# Patient Record
Sex: MALE | Race: WHITE | NOT HISPANIC OR LATINO | Employment: OTHER | URBAN - METROPOLITAN AREA
[De-identification: names, ages, dates, MRNs, and addresses within clinical notes are randomized per-mention and may not be internally consistent; named-entity substitution may affect disease eponyms.]

---

## 2017-12-04 ENCOUNTER — ALLSCRIPTS OFFICE VISIT (OUTPATIENT)
Dept: OTHER | Facility: OTHER | Age: 62
End: 2017-12-04

## 2018-01-22 VITALS
OXYGEN SATURATION: 97 % | BODY MASS INDEX: 32.31 KG/M2 | TEMPERATURE: 95.9 F | HEIGHT: 69 IN | WEIGHT: 218.13 LBS | HEART RATE: 60 BPM

## 2018-01-23 NOTE — PROGRESS NOTES
Assessment   1  Never a smoker  2  Encounter for preventive health examination (V70 0) (Z00 00)    Plan  Encounter for screening for malignant neoplasm of colon    · (1) OCCULT BLOOD, FECAL IMMUNOCHEMICAL TEST; Status:Active; Requested  for:[id number]; Health Maintenance    · Start: Zoster (Zostavax) (Zoster (Zostavax)); INJECT 0 5 ML Once   · Follow-up visit in 1 year Evaluation and Treatment  Follow-up  Status: Hold For -  Scheduling  Requested for: 64HKU6953    Discussion/Summary  Impression:  health maintenance visit1  1   Currently, he  eats a healthy diet1  and  has an adequate exercise regimen1  1   Prostate cancer screening:  prostate cancer screening is current1  1   Testicular cancer screening:  monthly self testicular exam was advised1  1   Colorectal cancer screening:  fecal occult blood testing supplies were given1  1   The  immunizations will be given as outlined in the orders1  and  disucssed adacel and he will check for coverage1  1   He was advised to be evaluated by  an optometrist1  1   Advice and education were given regarding  weight bearing exercise1  1   Pt plans to get zostavax at the pharmacy and will ck about adacel coverage  COntinue present Rx  He gets annual labs in the spring and was given a fit test today  Rto 1 year/prn1        The treatment plan was reviewed with the patient/guardian  The patient/guardian understands and agrees with the treatment plan1          Self Referrals: No       1 Amended By: Erum Barboza; Dec 05 2016 9:10 AM EST    Chief Complaint  Pt presents for Annual exam  Pt feels well and without complaint at this time  Refills done as requested  Pt would like to discuss getting the Zostavax in the near future  Appetite is good  History of Present Illness  HM, Adult Male: The patient is being seen for a  health maintenance1  evaluation1   The last health maintenance visit was1  1 year(s) ago1   General Health:  The patient's health since the last visit is described as1  good1   He has regular dental visits1   He denies vision problems1   He denies hearing loss1   Immunizations status:1  not up to date1    Interested in zostavax1   Lifestyle:1   He consumes a diverse and healthy diet1   He does not have any weight concerns1   He exercises regularly1   He does not use tobacco1   He consumes alcohol1   He denies drug use1   Reproductive health:1   The patient is sexually active1   Birth control is not being practiced1   He denies erectile dysfunction1   Screening: Cancer screening reviewed and current1   Metabolic screening reviewed and current1   Risk screening reviewed and current1   HPI: Patient feels well  Hunting regularly and no symptoms when active  Does have shoulder discomfort at times but non limiting and he uses home exercises and biofreeze prn  1        1 Amended By: Daniel Bailon; Dec 05 2016 9:07 AM EST    Review of Systems    Constitutional:1  No fever or chills, feels well, no tiredness, no recent weight gain or weight loss1   Eyes:1  No complaints of eye pain, no red eyes, no discharge from eyes, no itchy eyes1   ENT:1  no complaints of earache, no hearing loss, no nosebleeds, no nasal discharge, no sore throat, no hoarseness1   Cardiovascular: 1  No complaints of slow heart rate, no fast heart rate, no chest pain, no palpitations, no leg claudication, no lower extremity1   Respiratory:1  No complaints of shortness of breath, no wheezing, no cough, no SOB on exertion, no orthopnea or PND1   Gastrointestinal:1  No complaints of abdominal pain, no constipation, no nausea or vomiting, no diarrhea or bloody stools1   Genitourinary:1  No complaints of dysuria, no incontinence, no hesitancy, no nocturia, no genital lesion, no testicular pain1   Musculoskeletal:1  No complaints of arthralgia, no myalgias, no joint swelling or stiffness, no limb pain or swelling1      Integumentary:1  No complaints of skin rash or skin lesions, no itching, no skin wound, no dry skin1   Neurological:1  No compliants of headache, no confusion, no convulsions, no numbness or tingling, no dizziness or fainting, no limb weakness, no difficulty walking1   Psychiatric:1  Is not suicidal, no sleep disturbances, no anxiety or depression, no change in personality, no emotional problems1   Endocrine:1  No complaints of proptosis, no hot flashes, no muscle weakness, no erectile dysfunction, no deepening of the voice, no feelings of weakness1   Hematologic/Lymphatic:1  No complaints of swollen glands, no swollen glands in the neck, does not bleed easily, no easy bruising1   Over the past 2 weeks, how often have you been bothered by the following problems? 1 ) Little interest or pleasure in doing things? 1  Not at all1     2 ) Feeling down, depressed or hopeless? 1  Not at all1     3 ) Trouble falling asleep or sleeping too much? 1  Not at all1     4 ) Feeling tired or having little energy? 1  Not at all1     5 ) Poor appetite or overeating? 1  Not at all1     6 ) Feeling bad about yourself, or that you are a failure, or have let yourself or your family down? 1  Not at all1     7 ) Trouble concentrating on things, such as reading a newspaper or watching television? 1  Not at all1     8 ) Moving or speaking so slowly that other people could have noticed, or the opposite, moving or speaking faster than usual?1  Not at all1   How difficult have these problems made it for you to do your work, take care of things at home, or get along with people? 1  Not at all1   Score 1        1 Amended By: Bran Richey; Dec 05 2016 9:07 AM EST    Active Problems   1  Encounter for screening for malignant neoplasm of colon (V76 51) (Z12 11)  2  Hyperlipidemia (272 4) (E78 5)  3  Hypertension (401 9) (I10)  4   Right groin mass (789 39) (R19 09)    Past Medical History    · History of Bronchitis (490) (J40)   · History of Special screening examination for neoplasm of prostate (V76 44) (Z12 5)    Family History  Mother    · Family history of Glaucoma (365 9) (H40 9)   · Family history of Hypocholesteremia (272 5) (E78 6)  Father    · Family history of Heart attack (410 90) (I21 3)   · Family history of Hypocholesteremia (272 5) (E78 6)  Maternal Grandmother    · Family history of Glaucoma (365 9) (H40 9)    Social History    · Being A Social Drinker   · Caffeine Use   · Never a smoker   · Sun Protection Sunscreen   · Uses Safety Equipment - Seatbelts    Current Meds  1  Aspirin 81 MG TABS; Therapy: (Recorded:10Oct2012) to Recorded  2  Losartan Potassium-HCTZ 50-12 5 MG Oral Tablet; TAKE 1 TABLET DAILY; Therapy: 93PAG5800 to (Evaluate:17Nov2016)  Requested for: 06KKM8946; Last   Rx:23Nov2015 Ordered  3  Multiple Vitamins Oral Tablet; TAKE 1 TABLET DAILY; Therapy: (Recorded:47Yca5679) to Recorded    Allergies   1  No Known Drug Allergies    Vitals   Recorded: 34SMM4641 08:01AM Recorded: 86TTH5739 07:52AM   Temperature  13 3 F   Systolic 807    Diastolic 70    Height  5 ft 9 in   Weight  215 lb 4 00 oz   BMI Calculated  31 79   BSA Calculated  2 13     Physical Exam    Constitutional   General appearance: No acute distress, well appearing and well nourished  Head and Face1    Head and face: Normal 1    Palpation of the face and sinuses: No sinus tenderness  1    Eyes   Conjunctiva and lids: No erythema, swelling or discharge  Pupils and irises: Equal, round, reactive to light  Ophthalmoscopic examination: Normal fundi and optic discs  Ears, Nose, Mouth, and Throat   External inspection of ears and nose: Normal     Otoscopic examination: Tympanic membranes translucent with normal light reflex  Canals patent without erythema  Hearing: Normal     Nasal mucosa, septum, and turbinates: Normal without edema or erythema  Lips, teeth, and gums: Normal, good dentition  Oropharynx: Normal with no erythema, edema, exudate or lesions      Neck   Neck: Supple, symmetric, trachea midline, no masses  Thyroid: Normal, no thyromegaly  Pulmonary   Respiratory effort: No increased work of breathing or signs of respiratory distress  Percussion of chest: Normal     Palpation of chest: Normal     Auscultation of lungs: Clear to auscultation  Cardiovascular   Palpation of heart: Normal PMI, no thrills  Auscultation of heart: Normal rate and rhythm, normal S1 and S2, no murmurs  Carotid pulses: 2+ bilaterally  Abdominal aorta: Normal    Femoral pulses: 2+ bilaterally  Pedal pulses: 2+ bilaterally  Peripheral vascular exam: Normal 1    Examination of extremities for edema and/or varicosities: Normal    1    1    1    1    Abdomen   Abdomen: Non-tender, no masses  Liver and spleen: No hepatomegaly or splenomegaly  Examination for hernias: No hernias appreciated  1    1    1    1    1    1    Lymphatic   Palpation of lymph nodes in neck: No lymphadenopathy  Palpation of lymph nodes in axillae: No lymphadenopathy  Palpation of lymph nodes in groin: No lymphadenopathy  Palpation of lymph nodes in other areas: No lymphadenopathy  Musculoskeletal   Gait and station: Normal     Inspection/palpation of digits and nails: Normal without clubbing or cyanosis  Inspection/palpation of joints, bones, and muscles: Normal     Range of motion: Normal     Stability: Normal     Muscle strength/tone: Normal     Skin   Skin and subcutaneous tissue: Normal without rashes or lesions  Palpation of skin and subcutaneous tissue: Normal turgor  Neurologic   Cranial nerves: Cranial nerves 2-12 intact  Cortical function: Normal mental status  1    Reflexes: 2+ and symmetric  Sensation: No sensory loss  Coordination: Normal finger to nose and heel to shin  1    Psychiatric   Judgment and insight: Normal     Orientation to person, place and time: Normal     Recent and remote memory: Intact      Mood and affect: Normal         1 Amended By: Beatrice Kumar; Dec 05 2016 9:08 AM EST    Signatures   Electronically signed by : Gloria Larry DO; Dec  5 2016  9:10AM EST                       (Author)

## 2018-01-23 NOTE — PROGRESS NOTES
Assessment    1  Encounter for preventive health examination (V70 0) (Z00 00)   2  Eye irritation (379 99) (H57 8)    Plan  Depression screening    · *VB-Depression Screening; Status:Complete;   Done: 20TPI0293 12:00AM  Eye irritation    · Cephalexin 500 MG Oral Capsule; TAKE 1 CAPSULE 3 times daily  Health Maintenance    · Zoster (Zostavax) (Zoster (Zostavax))  Hyperlipidemia    · Benefits of Exercise/Physical Activity; Status:Complete;   Done: 87SAN4101   · Some eating tips that can help you lose weight ; Status:Complete;   Done: 65NZP7824   · We recommend that you bring your body mass index down to 26 ; Status:Complete;    Done: 85GDN0365    Discussion/Summary  health maintenance visit Impression: healthy adult male  Currently, he eats a healthy diet  Prostate cancer screening: prostate cancer screening is current  Keflex for eye lesion and if no change by 72 hours, ophtho evaluation ? blocked duct  Warm compresses  Will get labs in march  Increase fluids and exercise encouraged  Continue present rx  Rto 1 year  Due for routine eye exam and pt will setup regardless of eye sxs above  Possible side effects of new medications were reviewed with the patient/guardian today  The treatment plan was reviewed with the patient/guardian  The patient/guardian understands and agrees with the treatment plan         Self Referrals: No      Chief Complaint  Pt presents today for a yearly physical  No changes in medications, no current need for refills  No new drug allergies listed  Pt states that he has developed what he believed was a stye on the lower lid of his R eye, has drained but has not cleared up, would like to discuss  Pt notes that his appetite is normal, but he sometimes has difficulty sleeping  History of Present Illness  , Adult Male: The patient is being seen for a health maintenance evaluation  The last health maintenance visit was 1 year year(s) ago  General Health:  The patient's health since the last visit is described as good  He has regular dental visits  He denies vision problems  He denies hearing loss  Immunizations status: not up to date  Lifestyle:  He consumes a diverse and healthy diet  He does not have any weight concerns  He exercises regularly  He does not use tobacco  He denies alcohol use  He denies drug use  Reproductive health:  the patient is sexually active  birth control is being practiced  He complains of erectile dysfunction  Screening: cancer screening reviewed and current  metabolic screening reviewed and current  risk screening reviewed and current  Review of Systems    Constitutional: No fever or chills, feels well, no tiredness, no recent weight gain or weight loss  Eyes: No complaints of eye pain, no red eyes, no discharge from eyes, no itchy eyes  ENT: no complaints of earache, no hearing loss, no nosebleeds, no nasal discharge, no sore throat, no hoarseness  Cardiovascular: No complaints of slow heart rate, no fast heart rate, no chest pain, no palpitations, no leg claudication, no lower extremity  Respiratory: No complaints of shortness of breath, no wheezing, no cough, no SOB on exertion, no orthopnea or PND  Gastrointestinal: No complaints of abdominal pain, no constipation, no nausea or vomiting, no diarrhea or bloody stools  Genitourinary: No complaints of dysuria, no incontinence, no hesitancy, no nocturia, no genital lesion, no testicular pain  Musculoskeletal: No complaints of arthralgia, no myalgias, no joint swelling or stiffness, no limb pain or swelling  Integumentary: No complaints of skin rash or skin lesions, no itching, no skin wound, no dry skin  Neurological: No compliants of headache, no confusion, no convulsions, no numbness or tingling, no dizziness or fainting, no limb weakness, no difficulty walking     Psychiatric: Is not suicidal, no sleep disturbances, no anxiety or depression, no change in personality, no emotional problems  Endocrine: No complaints of proptosis, no hot flashes, no muscle weakness, no erectile dysfunction, no deepening of the voice, no feelings of weakness  Hematologic/Lymphatic: No complaints of swollen glands, no swollen glands in the neck, does not bleed easily, no easy bruising  Active Problems    1  Encounter for monitoring ACE-inhibitor therapy (V58 83,V58 69) (Z51 81,Z79 899)   2  Encounter for monitoring diuretic therapy (V58 83,V58 69) (Z51 81,Z79 899)   3  Encounter for screening for malignant neoplasm of colon (V76 51) (Z12 11)   4  Hyperlipidemia (272 4) (E78 5)   5  Hypertension (401 9) (I10)   6  Refused influenza vaccine (V64 06) (Z28 21)    Family History  Mother    · Family history of Glaucoma (365 9) (H40 9)   · Family history of Hypocholesteremia (272 5) (E78 6)  Father    · Family history of Heart attack (410 90) (I21 9)   · Family history of Hypocholesteremia (272 5) (E78 6)  Maternal Grandmother    · Family history of Glaucoma (365 9) (H40 9)    Social History    · Being A Social Drinker   · Caffeine Use   · Dental care, regularly   · Exercises 3 to 4 times per week (V49 89) (Z78 9)   · Lives independently with spouse   · Never a smoker   · Sun Protection Sunscreen   · Uses Safety Equipment - Seatbelts    Current Meds   1  Aspirin 81 MG TABS; Therapy: (Recorded:10Oct2012) to Recorded   2  Losartan Potassium-HCTZ 50-12 5 MG Oral Tablet; TAKE 1 TABLET DAILY; Therapy: 03OYZ8482 to (Evaluate:14Apr2018)  Requested for: 19Apr2017; Last   Rx:19Apr2017 Ordered   3  Multiple Vitamins Oral Tablet; TAKE 1 TABLET DAILY; Therapy: (Recorded:45Roh2963) to Recorded   4  Zoster (Zostavax); INJECT 0 5 ML Once; Therapy: 14PLO5542 to (Last Rx:94Eun4243) Ordered    Allergies    1   No Known Drug Allergies    Vitals   Recorded: 36ZWZ7733 07:49AM   Temperature 95 9 F, Tympanic   Heart Rate 60   Height 5 ft 9 in   Weight 218 lb 2 0 oz   BMI Calculated 32 21   BSA Calculated 2 14   O2 Saturation 97     Physical Exam    Constitutional   General appearance: No acute distress, well appearing and well nourished  Head and Face   Head and face: Normal     Palpation of the face and sinuses: No sinus tenderness  Eyes   Conjunctiva and lids: Abnormal   raised area lower lid right  Pupils and irises: Equal, round, reactive to light  Ophthalmoscopic examination: Normal fundi and optic discs  Ears, Nose, Mouth, and Throat   External inspection of ears and nose: Normal     Otoscopic examination: Tympanic membranes translucent with normal light reflex  Canals patent without erythema  Hearing: Normal     Nasal mucosa, septum, and turbinates: Normal without edema or erythema  Lips, teeth, and gums: Normal, good dentition  Oropharynx: Normal with no erythema, edema, exudate or lesions  Neck   Neck: Supple, symmetric, trachea midline, no masses  Thyroid: Normal, no thyromegaly  Pulmonary   Respiratory effort: No increased work of breathing or signs of respiratory distress  Percussion of chest: Normal     Palpation of chest: Normal     Auscultation of lungs: Clear to auscultation  Cardiovascular   Palpation of heart: Normal PMI, no thrills  Auscultation of heart: Normal rate and rhythm, normal S1 and S2, no murmurs  Carotid pulses: 2+ bilaterally  Abdominal aorta: Normal     Femoral pulses: 2+ bilaterally  Pedal pulses: 2+ bilaterally  Peripheral vascular exam: Normal     Examination of extremities for edema and/or varicosities: Normal     Abdomen   Abdomen: Non-tender, no masses  Liver and spleen: No hepatomegaly or splenomegaly  Examination for hernias: No hernias appreciated  Lymphatic   Palpation of lymph nodes in neck: No lymphadenopathy  Palpation of lymph nodes in axillae: No lymphadenopathy  Palpation of lymph nodes in groin: No lymphadenopathy  Palpation of lymph nodes in other areas: No lymphadenopathy      Musculoskeletal   Gait and station: Normal     Inspection/palpation of digits and nails: Normal without clubbing or cyanosis  Inspection/palpation of joints, bones, and muscles: Normal     Range of motion: Normal     Stability: Normal     Muscle strength/tone: Normal     Skin   Skin and subcutaneous tissue: Normal without rashes or lesions  Palpation of skin and subcutaneous tissue: Normal turgor  Neurologic   Cranial nerves: Cranial nerves 2-12 intact  Cortical function: Normal mental status  Reflexes: 2+ and symmetric  Sensation: No sensory loss  Coordination: Normal finger to nose and heel to shin  Psychiatric   Judgment and insight: Normal     Orientation to person, place and time: Normal     Recent and remote memory: Intact  Mood and affect: Normal        Results/Data  PHQ-2 Adult Depression Screening 60NFY7489 07:59AM User, [employer]     Test Name Result Flag Reference   PHQ-2 Adult Depression Score 0     Over the last two weeks, how often have you been bothered by any of the following problems? Little interest or pleasure in doing things: Not at all - 0  Feeling down, depressed, or hopeless: Not at all - 0   PHQ-2 Adult Depression Screening Negative       *VB-Depression Screening 53JLC6962 12:00AM Rica Peckey     Test Name Result Flag Reference   Depression Scale Result      Depression Screen - Negative For Symptoms       Health Management  Encounter for monitoring ACE-inhibitor therapy   (1) COMPREHENSIVE METABOLIC PANEL; every 1 year; Last 33KKQ7509; Next Due:  85IXN1536; Active  Encounter for monitoring diuretic therapy   (1) COMPREHENSIVE METABOLIC PANEL; every 1 year; Last 07JDF2557; Next Due:  26KZS9874; Active  Health Maintenance   (1) COMPREHENSIVE METABOLIC PANEL; every 1 year; Last 17BXN8156; Next Due:  20SNL6848;  Active    Signatures   Electronically signed by : Oscar Hook DO; Dec  4 2017 12:01PM EST                       (Author)

## 2018-03-17 LAB
ALP SERPL-CCNC: 68 U/L (ref 46–116)
AST SERPL W P-5'-P-CCNC: 23 U/L (ref 5–45)
BILIRUB SERPL-MCNC: 0.8 MG/DL
BUN SERPL-MCNC: 20 MG/DL (ref 5–25)
CHOLEST SERPL-MCNC: 191 MG/DL (ref 50–200)
CREAT SERPL-MCNC: 1.34 MG/DL (ref 0.6–1.3)
GLUCOSE SERPL-MCNC: 99 MG/DL
HCT VFR BLD AUTO: 42.7 % (ref 36.5–49.3)
HDLC SERPL-MCNC: 49 MG/DL (ref 40–60)
HGB BLD-MCNC: 14.9 G/DL (ref 12–17)
LDLC SERPL DIRECT ASSAY-MCNC: 126 MG/DL
LDLC/HDLC SERPL: 3.9 {RATIO}
NEUTROPHILS # BLD AUTO: 6.52 THOUSANDS/ΜL (ref 1.85–7.62)
PLATELET # BLD AUTO: 210 THOUSANDS/UL (ref 149–390)
POTASSIUM SERPL-SCNC: 4.8 MMOL/L (ref 3.5–5.3)
SODIUM SERPL-SCNC: 142 MMOL/L (ref 136–145)
TRIGL SERPL-MCNC: 64 MG/DL (ref ?–150)
WBC # BLD AUTO: 9.1 10*3/ML (ref 3.3–10)

## 2018-03-26 ENCOUNTER — TELEPHONE (OUTPATIENT)
Dept: INTERNAL MEDICINE CLINIC | Facility: CLINIC | Age: 63
End: 2018-03-26

## 2018-03-26 LAB — PSA (HISTORICAL): 5.1 NG/ML

## 2018-03-26 NOTE — TELEPHONE ENCOUNTER
Spoke with patient regarding recent [facility] labs  PSA elevated, referral will be made to [facility] ([facility] Urology, patient aware and approved  Cholesterol mildly elevated  Low fat diet/ exercise

## 2018-04-09 ENCOUNTER — TELEPHONE (OUTPATIENT)
Dept: INTERNAL MEDICINE CLINIC | Facility: CLINIC | Age: 63
End: 2018-04-09

## 2018-04-09 DIAGNOSIS — R97.20 ELEVATED PSA: Primary | ICD-10-CM

## 2018-05-16 RX ORDER — ZOSTER VACCINE LIVE 19400 [PFU]/.65ML
0.5 INJECTION, POWDER, LYOPHILIZED, FOR SUSPENSION SUBCUTANEOUS
COMMUNITY
Start: 2016-12-05 | End: 2019-12-13 | Stop reason: ALTCHOICE

## 2018-05-16 RX ORDER — LOSARTAN POTASSIUM AND HYDROCHLOROTHIAZIDE 12.5; 5 MG/1; MG/1
1 TABLET ORAL DAILY
COMMUNITY
Start: 2014-02-06 | End: 2019-05-17 | Stop reason: SDUPTHER

## 2018-05-30 ENCOUNTER — OFFICE VISIT (OUTPATIENT)
Dept: UROLOGY | Facility: CLINIC | Age: 63
End: 2018-05-30
Payer: COMMERCIAL

## 2018-05-30 VITALS
DIASTOLIC BLOOD PRESSURE: 82 MMHG | WEIGHT: 221 LBS | HEIGHT: 69 IN | BODY MASS INDEX: 32.73 KG/M2 | SYSTOLIC BLOOD PRESSURE: 124 MMHG | HEART RATE: 56 BPM

## 2018-05-30 DIAGNOSIS — R97.20 ELEVATED PSA: ICD-10-CM

## 2018-05-30 LAB
POST-VOID RESIDUAL VOLUME, ML POC: 0 ML
SL AMB  POCT GLUCOSE, UA: NORMAL
SL AMB LEUKOCYTE ESTERASE,UA: NORMAL
SL AMB POCT BILIRUBIN,UA: NORMAL
SL AMB POCT BLOOD,UA: NORMAL
SL AMB POCT CLARITY,UA: NORMAL
SL AMB POCT COLOR,UA: YELLOW
SL AMB POCT KETONES,UA: NORMAL
SL AMB POCT NITRITE,UA: NORMAL
SL AMB POCT PH,UA: 5
SL AMB POCT SPECIFIC GRAVITY,UA: 1.01
SL AMB POCT URINE PROTEIN: NORMAL
SL AMB POCT UROBILINOGEN: NORMAL

## 2018-05-30 PROCEDURE — 51798 US URINE CAPACITY MEASURE: CPT | Performed by: UROLOGY

## 2018-05-30 PROCEDURE — 81002 URINALYSIS NONAUTO W/O SCOPE: CPT | Performed by: UROLOGY

## 2018-05-30 PROCEDURE — 99244 OFF/OP CNSLTJ NEW/EST MOD 40: CPT | Performed by: UROLOGY

## 2018-05-30 NOTE — PROGRESS NOTES
UROLOGY NEW CONSULT NOTE     CHIEF COMPLAINT   Cheryle Rankin is a 65 y o  male with a complaint of   Chief Complaint   Patient presents with    Elevated PSA       History of Present Illness:     65 y o  male with recent PSA check of 5 1  Patient has been undergoing health fair PSAs for the last handful of years  He has not had a digital rectal exam for the last 3 years  He is  and has no family history of prostate cancer  His father in law does have prostate cancer and has been treated with radiation and androgen deprivation therapy  He presents for discussion  He has some mild low level urinary urgency and hesitancy  He is not interested in treatment for this  He is very healthy and exercises and lift weights regularly  AUA SS 13 QoL 2    PSA 3/17/2018 - 5 1  PSA 3/19/2017 - 3 9  PSA 3/20/2016 - 2 5  PSA 2015 - 3 7  PSA 2014 - 2 7  PSA 2013 - 2 5      Past Medical History:   No past medical history on file  PAST SURGICAL HISTORY:   No past surgical history on file  CURRENT MEDICATIONS:     Current Outpatient Prescriptions   Medication Sig Dispense Refill    aspirin 81 MG tablet Take by mouth      losartan-hydrochlorothiazide (HYZAAR) 50-12 5 mg per tablet Take 1 tablet by mouth daily      MULTIPLE VITAMINS ESSENTIAL PO Take 1 tablet by mouth daily      Zoster Vaccine Live (ZOSTAVAX) 87701 UNT/0 65ML SUSR Inject 0 5 mL under the skin       No current facility-administered medications for this visit          ALLERGIES:   No Known Allergies    SOCIAL HISTORY:     Social History     Social History    Marital status: /Civil Union     Spouse name: N/A    Number of children: N/A    Years of education: N/A     Social History Main Topics    Smoking status: Not on file    Smokeless tobacco: Not on file    Alcohol use Not on file    Drug use: Unknown    Sexual activity: Not on file     Other Topics Concern    Not on file     Social History Narrative    No narrative on file SOCIAL HISTORY:   No family history on file  REVIEW OF SYSTEMS:     Review of Systems   Constitutional: Negative  Respiratory: Negative  Cardiovascular: Negative  Gastrointestinal: Negative  Genitourinary: Positive for frequency and urgency  Musculoskeletal: Negative  Skin: Negative  Neurological: Negative  Psychiatric/Behavioral: Negative  PHYSICAL EXAM:     /82   Pulse 56   Ht 5' 9" (1 753 m)   Wt 100 kg (221 lb)   BMI 32 64 kg/m²     General:  Healthy appearing male in no acute distress  They have a normal affect  There is not appear to be any gross neurologic defects or abnormalities  HEENT:  Normocephalic, atraumatic  Neck is supple without any palpable lymphadenopathy  Cardiovascular:  Patient has normal palpable distal radial pulses  There is no significant peripheral edema  No JVD is noted  Respiratory:  Patient has unlabored respirations  There is no audible wheeze or rhonchi  Abdomen:  Abdomen is without surgical scars  Right anterior thigh scar  Abdomen is soft and nontender  There is no tympany  Inguinal and umbilical hernia are not appreciated  Genitourinary: no penile lesions or discharge, no testicular masses or tenderness, no hernias, TALYA broad based with left sided fullness but no discreet nodule    Musculoskeletal:  Patient does not have significant CVA tenderness in the  flank with palpation or percussion  They full range of motion in all 4 extremities  Strength in all 4 extremities appears congruent  Patient is able to ambulate without assistance or difficulty  Dermatologic:  Patient has no skin abnormalities or rashes        LABS:     CBC:   Lab Results   Component Value Date    WBC 9 1 03/17/2018    HGB 14 9 03/17/2018    HCT 42 7 03/17/2018     03/17/2018       BMP:   Lab Results   Component Value Date     03/17/2018    K 4 8 03/17/2018    BUN 20 03/17/2018    CREATININE 1 34 (A) 03/17/2018     PSA 3/17/2018 - 5  1  PSA 3/19/2017 - 3 9  PSA 3/20/2016 - 2 5  PSA 2015 - 3 7  PSA 2014 - 2 7  PSA 2013 - 2 5    IMAGING:     NO  IMAGING    PROCEDURE:     Recent Results (from the past 2 hour(s))   POCT Measure PVR    Collection Time: 05/30/18 10:13 AM   Result Value Ref Range    POST-VOID RESIDUAL VOLUME, ML POC 0 mL   POCT urine dip    Collection Time: 05/30/18 10:13 AM   Result Value Ref Range    LEUKOCYTE ESTERASE,UA -      NITRITE,UA -     SL AMB POCT UROBILINOGEN -     SL AMB POCT URINE PROTEIN -      PH,UA 5 0      BLOOD,UA -      SPECIFIC GRAVITY,UA 1 010      KETONES,UA -      BILIRUBIN,UA -     GLUCOSE, UA -      COLOR,UA yellow      CLARITY,UA trans         ASSESSMENT:     65 y o  male with elevated PSA    PLAN:       I gave the patient a general overview surrounding prostate health  We discussed the gland's anatomy and function  We discussed that PSA is protein made by the prostate gland in normal healthy males  When screening for prostate cancer, we evaluate man at high risk for prostate cancer or those men within a predefined age range who have a lfe expectancy greater than ten years  These screening guidelines were set forth by our colleagues at the [address] [place] [address] in an effort to help find early, treatable cancers but als to minimize worry and harm caused by over-screening and over-treatment  Screening is performed by both examining the prostate via a digital rectal exam and by checking a PSA in routine bloodwork  Should there PSA be elevated outside of an acceptable range or if they're found to have abnormalities on digital rectal exam, a careful discussion needs to be held about proceeding to the next step in management and obtaining tissue through prostate biopsy for evaluation for cancer      We discussed that PSA is an imperfect screening tool and there are other reasons at the PSA can be elevated including but not limited to urinary infection, section transmitted infection, benign prostatic enlargement, urinary stones, trauma, and recent sexual activity  Repeat PSA in 2 weeks with avoidance of ejaculation for three days prior and if PSA normalizes, additional recheck in 6 months  If PSA remains high, will have to discuss consideration of biopsy

## 2018-06-13 ENCOUNTER — APPOINTMENT (OUTPATIENT)
Dept: LAB | Facility: CLINIC | Age: 63
End: 2018-06-13
Payer: COMMERCIAL

## 2018-06-13 DIAGNOSIS — R97.20 ELEVATED PSA: ICD-10-CM

## 2018-06-13 PROCEDURE — 84154 ASSAY OF PSA FREE: CPT

## 2018-06-13 PROCEDURE — 36415 COLL VENOUS BLD VENIPUNCTURE: CPT

## 2018-06-13 PROCEDURE — 84153 ASSAY OF PSA TOTAL: CPT

## 2018-06-15 LAB
PSA FREE MFR SERPL: 20.3 %
PSA FREE SERPL-MCNC: 0.63 NG/ML
PSA SERPL-MCNC: 3.1 NG/ML (ref 0–4)

## 2018-06-18 ENCOUNTER — TELEPHONE (OUTPATIENT)
Dept: UROLOGY | Facility: CLINIC | Age: 63
End: 2018-06-18

## 2018-11-30 ENCOUNTER — TRANSCRIBE ORDERS (OUTPATIENT)
Dept: LAB | Facility: CLINIC | Age: 63
End: 2018-11-30

## 2018-11-30 ENCOUNTER — APPOINTMENT (OUTPATIENT)
Dept: LAB | Facility: CLINIC | Age: 63
End: 2018-11-30
Payer: COMMERCIAL

## 2018-11-30 DIAGNOSIS — R97.20 PSA ELEVATION: Primary | ICD-10-CM

## 2018-11-30 DIAGNOSIS — R97.20 PSA ELEVATION: ICD-10-CM

## 2018-11-30 LAB — PSA SERPL-MCNC: 4.8 NG/ML (ref 0–4)

## 2018-11-30 PROCEDURE — 84153 ASSAY OF PSA TOTAL: CPT

## 2018-12-03 ENCOUNTER — TELEPHONE (OUTPATIENT)
Dept: INTERNAL MEDICINE CLINIC | Facility: CLINIC | Age: 63
End: 2018-12-03

## 2018-12-03 DIAGNOSIS — R97.20 ELEVATED PSA: Primary | ICD-10-CM

## 2018-12-06 ENCOUNTER — OFFICE VISIT (OUTPATIENT)
Dept: INTERNAL MEDICINE CLINIC | Facility: CLINIC | Age: 63
End: 2018-12-06
Payer: COMMERCIAL

## 2018-12-06 ENCOUNTER — APPOINTMENT (OUTPATIENT)
Dept: RADIOLOGY | Facility: MEDICAL CENTER | Age: 63
End: 2018-12-06
Payer: COMMERCIAL

## 2018-12-06 VITALS
TEMPERATURE: 96.8 F | WEIGHT: 218.13 LBS | HEART RATE: 68 BPM | DIASTOLIC BLOOD PRESSURE: 68 MMHG | OXYGEN SATURATION: 96 % | HEIGHT: 69 IN | BODY MASS INDEX: 32.31 KG/M2 | SYSTOLIC BLOOD PRESSURE: 122 MMHG

## 2018-12-06 DIAGNOSIS — Z00.00 VISIT FOR PREVENTIVE HEALTH EXAMINATION: ICD-10-CM

## 2018-12-06 DIAGNOSIS — R52 PAIN: ICD-10-CM

## 2018-12-06 DIAGNOSIS — R52 PAIN: Primary | ICD-10-CM

## 2018-12-06 PROCEDURE — 73630 X-RAY EXAM OF FOOT: CPT

## 2018-12-06 PROCEDURE — 99396 PREV VISIT EST AGE 40-64: CPT | Performed by: INTERNAL MEDICINE

## 2018-12-06 NOTE — PATIENT INSTRUCTIONS

## 2018-12-07 ENCOUNTER — APPOINTMENT (OUTPATIENT)
Dept: LAB | Facility: CLINIC | Age: 63
End: 2018-12-07
Payer: COMMERCIAL

## 2018-12-07 DIAGNOSIS — R97.20 ELEVATED PSA: ICD-10-CM

## 2018-12-07 PROCEDURE — 84154 ASSAY OF PSA FREE: CPT

## 2018-12-07 PROCEDURE — 84153 ASSAY OF PSA TOTAL: CPT

## 2018-12-07 PROCEDURE — 36415 COLL VENOUS BLD VENIPUNCTURE: CPT

## 2018-12-08 LAB
PSA FREE MFR SERPL: 26.8 %
PSA FREE SERPL-MCNC: 1.66 NG/ML
PSA SERPL-MCNC: 6.2 NG/ML (ref 0–4)

## 2018-12-10 NOTE — PROGRESS NOTES
Pt was informed Pt states that he is going to hold off on the MRI and just keep an eye on it and will contact us if he decides to get the MRI

## 2018-12-10 NOTE — PROGRESS NOTES
12/11/2018      Chief Complaint   Patient presents with    Elevated PSA     6 mo f/u- PSA 6 2 12/7/18       Assessment and Plan    59 y o  male managed by Dr Fernando Vieyra    1  Elevated PSA  - PSA 6 2 (12/7/18), 4 8 (11/30/18), 3 1 (6/13/18), 5 1 (3/17/18), 3 9 (3/19/17), 2 5 (3/20/16), 3 7 (2015), 2 7 (2014), 2 5 (2013)  - TRUS biopsy recommended as the patient PSA continues to be elevated  - Risks of TRUS biopsy were discussed (blood in his stool, urine, or ejaculate for up to 2-4 weeks as well as infection)  Cipro was sent electronically to his pharmacy  He is aware he should start this the day prior to biopsy and continue for 2 days until complete  Prep instructions including fleets enema the morning of and stopping anticoagulation 10 days prior were reviewed  Patient is agreeable to proceeding  History of Present Illness  Lilly Huerat is a 59 y o  male here for follow up evaluation of an elevated PSA  The patient's PSA trend is as follows:  6 2 (12/7/18), 4 8 (11/30/18), 3 1 (6/13/18), 5 1 (3/17/18), 3 9 (3/19/17), 2 5 (3/20/16), 3 7 (2015), 2 7 (2014), 2 5 (2013)  His lower urinary tract symptoms and AUA symptom score are listed below  He has no urinary complaints at his visit today  Review of Systems   Constitutional: Negative for activity change, chills and fever  Gastrointestinal: Negative for abdominal distention and abdominal pain  Musculoskeletal: Negative for back pain and gait problem  Psychiatric/Behavioral: Negative for behavioral problems and confusion  Urinary Incontinence Screening      Most Recent Value   Urinary Incontinence   Urinary Incontinence? No   Incomplete emptying? Yes   Urinary frequency? Yes   Urinary urgency? Yes   Urinary hesitancy? No   Dysuria (painful difficult urination)? Yes ["occasionally more of a pressure feeling"]   Nocturia (waking up to use the bathroom)? Yes [ once per night]   Straining (having to push to go)?   No   Weak stream?  Yes Intermittent stream?  Yes   Post void dribbling? Yes        AUA SYMPTOM SCORE      Most Recent Value   AUA SYMPTOM SCORE   How often have you had a sensation of not emptying your bladder completely after you finished urinating? 2   How often have you had to urinate again less than two hours after you finished urinating? 2   How often have you found you stopped and started again several times when you urinate? 1   How often have you found it difficult to postpone urination? 2   How often have you had a weak urinary stream?  3   How often have you had to push or strain to begin urination? 0   How many times did you most typically get up to urinate from the time you went to bed at night until the time you got up in the morning? 1   Quality of Life: If you were to spend the rest of your life with your urinary condition just the way it is now, how would you feel about that?  2   AUA SYMPTOM SCORE  11          Past Medical History  History reviewed  No pertinent past medical history  Past Social History  History reviewed  No pertinent surgical history  History   Smoking Status    Never Smoker   Smokeless Tobacco    Never Used       Past Family History  Family History   Problem Relation Age of Onset    Glaucoma Mother     Hyperlipidemia Mother     Heart attack Father     Hyperlipidemia Father     Glaucoma Maternal Grandmother        Past Social history  Social History     Social History    Marital status: /Civil Union     Spouse name: N/A    Number of children: N/A    Years of education: N/A     Occupational History    Not on file       Social History Main Topics    Smoking status: Never Smoker    Smokeless tobacco: Never Used    Alcohol use Yes      Comment: social    Drug use: Unknown    Sexual activity: Not on file     Other Topics Concern    Not on file     Social History Narrative    Caffeine use    Dental care, regularly    Exercises 3 to 4 times per week    Lives independently with spouse    Sun protection sunscreen    Uses safety equipment-seatbelts               Current Medications  Current Outpatient Prescriptions   Medication Sig Dispense Refill    aspirin 81 MG tablet Take by mouth      losartan-hydrochlorothiazide (HYZAAR) 50-12 5 mg per tablet Take 1 tablet by mouth daily      MULTIPLE VITAMINS ESSENTIAL PO Take 1 tablet by mouth daily      NON FORMULARY 2 (two) times a day Super Beta Prostate supplement      ciprofloxacin (CIPRO) 500 mg tablet Take 1 tablet (500 mg total) by mouth 2 (two) times a day for 3 days Starting the day prior to biopsy 6 tablet 0    Zoster Vaccine Live (ZOSTAVAX) 37904 UNT/0 65ML SUSR Inject 0 5 mL under the skin       No current facility-administered medications for this visit  Allergies  No Known Allergies      The following portions of the patient's history were reviewed and updated as appropriate: allergies, current medications, past medical history, past social history, past surgical history and problem list       Vitals  Vitals:    12/11/18 1054   BP: 130/76   BP Location: Right arm   Patient Position: Sitting   Cuff Size: Large   Pulse: 60   Weight: 99 5 kg (219 lb 6 4 oz)   Height: 5' 9" (1 753 m)         Physical Exam  Constitutional   General appearance: Patient is seated and in no acute distress, well appearing and well nourished  Head and Face   Head and face: Normal     Eyes   Conjunctiva and lids: No erythema, swelling or discharge  Ears, Nose, Mouth, and Throat   Hearing: Normal     Pulmonary   Respiratory effort: No increased work of breathing or signs of respiratory distress  Cardiovascular   Examination of extremities for edema and/or varicosities: Normal     Abdomen   Abdomen: Non-tender, no masses  Musculoskeletal   Gait and station: Normal     Skin   Skin and subcutaneous tissue: Warm, dry, and intact  No visible lesions or rashes    Psychiatric   Judgment and insight: Normal  Recent and remote memory: Normal  Mood and affect: Normal      Results  No results found for this or any previous visit (from the past 1 hour(s)) ]  Lab Results   Component Value Date    PSA 6 2 (H) 12/07/2018    PSA 4 8 (H) 11/30/2018    PSA 3 1 06/13/2018     Lab Results   Component Value Date    K 4 8 03/17/2018    BUN 20 03/17/2018    CREATININE 1 34 (A) 03/17/2018     Lab Results   Component Value Date    WBC 9 1 03/17/2018    HGB 14 9 03/17/2018    HCT 42 7 03/17/2018     03/17/2018         Orders  Orders Placed This Encounter   Procedures    Biopsy prostate

## 2018-12-11 ENCOUNTER — OFFICE VISIT (OUTPATIENT)
Dept: UROLOGY | Facility: CLINIC | Age: 63
End: 2018-12-11
Payer: COMMERCIAL

## 2018-12-11 VITALS
SYSTOLIC BLOOD PRESSURE: 130 MMHG | DIASTOLIC BLOOD PRESSURE: 76 MMHG | BODY MASS INDEX: 32.5 KG/M2 | HEART RATE: 60 BPM | WEIGHT: 219.4 LBS | HEIGHT: 69 IN

## 2018-12-11 DIAGNOSIS — R97.20 ELEVATED PSA: Primary | ICD-10-CM

## 2018-12-11 PROCEDURE — 99213 OFFICE O/P EST LOW 20 MIN: CPT | Performed by: PHYSICIAN ASSISTANT

## 2018-12-11 RX ORDER — CIPROFLOXACIN 500 MG/1
500 TABLET, FILM COATED ORAL 2 TIMES DAILY
Qty: 6 TABLET | Refills: 0 | Status: SHIPPED | OUTPATIENT
Start: 2018-12-11 | End: 2018-12-14

## 2019-02-26 ENCOUNTER — APPOINTMENT (EMERGENCY)
Dept: RADIOLOGY | Facility: HOSPITAL | Age: 64
End: 2019-02-26
Payer: OTHER MISCELLANEOUS

## 2019-02-26 ENCOUNTER — APPOINTMENT (EMERGENCY)
Dept: CT IMAGING | Facility: HOSPITAL | Age: 64
End: 2019-02-26
Payer: OTHER MISCELLANEOUS

## 2019-02-26 ENCOUNTER — HOSPITAL ENCOUNTER (EMERGENCY)
Facility: HOSPITAL | Age: 64
Discharge: HOME | End: 2019-02-26
Attending: FAMILY MEDICINE | Admitting: FAMILY MEDICINE
Payer: OTHER MISCELLANEOUS

## 2019-02-26 VITALS
RESPIRATION RATE: 18 BRPM | OXYGEN SATURATION: 100 % | HEIGHT: 69 IN | HEART RATE: 60 BPM | WEIGHT: 224.43 LBS | DIASTOLIC BLOOD PRESSURE: 85 MMHG | TEMPERATURE: 98.7 F | SYSTOLIC BLOOD PRESSURE: 169 MMHG | BODY MASS INDEX: 33.24 KG/M2

## 2019-02-26 DIAGNOSIS — S52.501A DISTAL RADIUS FRACTURE, RIGHT: Primary | ICD-10-CM

## 2019-02-26 DIAGNOSIS — S62.309A METACARPAL BONE FRACTURE: ICD-10-CM

## 2019-02-26 DIAGNOSIS — S82.002A LEFT PATELLA FRACTURE: ICD-10-CM

## 2019-02-26 DIAGNOSIS — S01.81XA FACIAL LACERATION, INITIAL ENCOUNTER: ICD-10-CM

## 2019-02-26 LAB
ANION GAP SERPL CALCULATED.3IONS-SCNC: 7 MMOL/L (ref 4–13)
APTT PPP: 29 SECONDS (ref 26–38)
BASOPHILS # BLD AUTO: 0.04 THOUSANDS/ΜL (ref 0–0.1)
BASOPHILS NFR BLD AUTO: 1 % (ref 0–1)
BUN SERPL-MCNC: 18 MG/DL (ref 5–25)
CALCIUM SERPL-MCNC: 9 MG/DL (ref 8.3–10.1)
CHLORIDE SERPL-SCNC: 104 MMOL/L (ref 100–108)
CO2 SERPL-SCNC: 30 MMOL/L (ref 21–32)
CREAT SERPL-MCNC: 1.37 MG/DL (ref 0.6–1.3)
EOSINOPHIL # BLD AUTO: 0.12 THOUSAND/ΜL (ref 0–0.61)
EOSINOPHIL NFR BLD AUTO: 2 % (ref 0–6)
ERYTHROCYTE [DISTWIDTH] IN BLOOD BY AUTOMATED COUNT: 11.7 % (ref 11.6–15.1)
GFR SERPL CREATININE-BSD FRML MDRD: 55 ML/MIN/1.73SQ M
GLUCOSE SERPL-MCNC: 112 MG/DL (ref 65–140)
HCT VFR BLD AUTO: 43.4 % (ref 36.5–49.3)
HGB BLD-MCNC: 14.6 G/DL (ref 12–17)
IMM GRANULOCYTES # BLD AUTO: 0.02 THOUSAND/UL (ref 0–0.2)
IMM GRANULOCYTES NFR BLD AUTO: 0 % (ref 0–2)
INR PPP: 1 (ref 0.86–1.17)
LYMPHOCYTES # BLD AUTO: 1.78 THOUSANDS/ΜL (ref 0.6–4.47)
LYMPHOCYTES NFR BLD AUTO: 25 % (ref 14–44)
MCH RBC QN AUTO: 30.2 PG (ref 26.8–34.3)
MCHC RBC AUTO-ENTMCNC: 33.6 G/DL (ref 31.4–37.4)
MCV RBC AUTO: 90 FL (ref 82–98)
MONOCYTES # BLD AUTO: 0.58 THOUSAND/ΜL (ref 0.17–1.22)
MONOCYTES NFR BLD AUTO: 8 % (ref 4–12)
NEUTROPHILS # BLD AUTO: 4.63 THOUSANDS/ΜL (ref 1.85–7.62)
NEUTS SEG NFR BLD AUTO: 64 % (ref 43–75)
NRBC BLD AUTO-RTO: 0 /100 WBCS
PLATELET # BLD AUTO: 207 THOUSANDS/UL (ref 149–390)
PMV BLD AUTO: 8.8 FL (ref 8.9–12.7)
POTASSIUM SERPL-SCNC: 4.2 MMOL/L (ref 3.5–5.3)
PROTHROMBIN TIME: 12.7 SECONDS (ref 11.8–14.2)
RBC # BLD AUTO: 4.83 MILLION/UL (ref 3.88–5.62)
SODIUM SERPL-SCNC: 141 MMOL/L (ref 136–145)
WBC # BLD AUTO: 7.17 THOUSAND/UL (ref 4.31–10.16)

## 2019-02-26 PROCEDURE — 99284 EMERGENCY DEPT VISIT MOD MDM: CPT

## 2019-02-26 PROCEDURE — 96360 HYDRATION IV INFUSION INIT: CPT

## 2019-02-26 PROCEDURE — 85025 COMPLETE CBC W/AUTO DIFF WBC: CPT | Performed by: PHYSICIAN ASSISTANT

## 2019-02-26 PROCEDURE — 90715 TDAP VACCINE 7 YRS/> IM: CPT | Performed by: PHYSICIAN ASSISTANT

## 2019-02-26 PROCEDURE — 73110 X-RAY EXAM OF WRIST: CPT

## 2019-02-26 PROCEDURE — 36415 COLL VENOUS BLD VENIPUNCTURE: CPT | Performed by: PHYSICIAN ASSISTANT

## 2019-02-26 PROCEDURE — 90471 IMMUNIZATION ADMIN: CPT

## 2019-02-26 PROCEDURE — 71045 X-RAY EXAM CHEST 1 VIEW: CPT

## 2019-02-26 PROCEDURE — 72125 CT NECK SPINE W/O DYE: CPT

## 2019-02-26 PROCEDURE — 70450 CT HEAD/BRAIN W/O DYE: CPT

## 2019-02-26 PROCEDURE — 80048 BASIC METABOLIC PNL TOTAL CA: CPT | Performed by: PHYSICIAN ASSISTANT

## 2019-02-26 PROCEDURE — 85730 THROMBOPLASTIN TIME PARTIAL: CPT | Performed by: PHYSICIAN ASSISTANT

## 2019-02-26 PROCEDURE — 73590 X-RAY EXAM OF LOWER LEG: CPT

## 2019-02-26 PROCEDURE — 85610 PROTHROMBIN TIME: CPT | Performed by: PHYSICIAN ASSISTANT

## 2019-02-26 PROCEDURE — 73564 X-RAY EXAM KNEE 4 OR MORE: CPT

## 2019-02-26 RX ORDER — HYDROCODONE BITARTRATE AND ACETAMINOPHEN 5; 325 MG/1; MG/1
1 TABLET ORAL EVERY 6 HOURS PRN
Qty: 8 TABLET | Refills: 0 | Status: SHIPPED | OUTPATIENT
Start: 2019-02-26 | End: 2019-03-08

## 2019-02-26 RX ADMIN — TETANUS TOXOID, REDUCED DIPHTHERIA TOXOID AND ACELLULAR PERTUSSIS VACCINE, ADSORBED 0.5 ML: 5; 2.5; 8; 8; 2.5 SUSPENSION INTRAMUSCULAR at 10:03

## 2019-02-26 RX ADMIN — SODIUM CHLORIDE 1000 ML: 0.9 INJECTION, SOLUTION INTRAVENOUS at 09:41

## 2019-02-26 NOTE — ED PROCEDURE NOTE
Procedure  Static Splint Application  Date/Time: 2/26/2019 10:53 AM  Performed by: Luis E Montez PA-C  Authorized by: Luis E Montez PA-C     Patient location:  Bedside  Procedure performed by emergency physician: Yes    Other Assisting Provider: Yes (comment) Kee Lee RN)    Consent:     Consent obtained:  Verbal    Consent given by:  Patient    Risks discussed:  Discoloration, numbness, pain and swelling    Alternatives discussed:  Delayed treatment  Universal protocol:     Procedure explained and questions answered to patient or proxy's satisfaction: yes      Immediately prior to procedure a time out was called: yes      Patient identity confirmed:  Verbally with patient  Indication:     Indications: fracture    Pre-procedure details:     Sensation:  Normal  Procedure details:     Laterality:  Right    Location:  Wrist    Wrist:  R wrist    Strapping: no      Splint type:  Radial gutter    Supplies:  Ortho-Glass  Post-procedure details:     Pain:  Unchanged    Sensation:  Normal    Neurovascular Exam: skin pink      Patient tolerance of procedure:   Tolerated well, no immediate complications                     Luis E Montez PA-C  02/26/19 1054

## 2019-02-26 NOTE — ED PROVIDER NOTES
History  Chief Complaint   Patient presents with    Fall     right wrist injury      Patient presents to the emergency department today ambulatory via private vehicle  He initially told the registration clerks that he had right-sided wrist pain after falling 2 stories from a roof  Based upon mechanism affect he takes aspirin trauma level evaluation Charli was called to the Emergency   Patient ambulated back to the partner and has a primary complaint of some right-sided wrist pain  He offers minor complaints of some scrapes on his forehead as well as bilateral lower extremities  He provides his own history is alert orient x4 states that he was attempting to place some shingles at had bone off of a roof this morning this was approximately an hour ago  He was on his 1st floor porch roof standing on a metallic ladder climbing up when the footing of the lateral was lost causing the ladder to slide out  He initially fell onto the 1st floor roof and states he then bounced off of the 1st floor roof onto the ground landing on the lateral as well  He denies loss of consciousness  He did strike his head however  States he does take 81 mg of aspirin typically however due to an upcoming prostate procedure he has not been taking it over the last 2 days  Tetanus history is unknown  He denies headache neck pain or back pain  Denies chest pain shortness of breath or abdominal pain  He denies vomiting  He presents with his wife  Prior to Admission Medications   Prescriptions Last Dose Informant Patient Reported? Taking?    MULTIPLE VITAMINS ESSENTIAL PO   Yes No   Sig: Take 1 tablet by mouth daily   NON FORMULARY   Yes No   Si (two) times a day Super Beta Prostate supplement   Zoster Vaccine Live (ZOSTAVAX) 09978 UNT/0 65ML SUSR   Yes No   Sig: Inject 0 5 mL under the skin   aspirin 81 MG tablet   Yes No   Sig: Take by mouth   losartan-hydrochlorothiazide (HYZAAR) 50-12 5 mg per tablet   Yes No Sig: Take 1 tablet by mouth daily      Facility-Administered Medications: None       Past Medical History:   Diagnosis Date    Hypertension        Past Surgical History:   Procedure Laterality Date    LEG SURGERY Right     tumor removal        Family History   Problem Relation Age of Onset    Glaucoma Mother     Hyperlipidemia Mother     Heart attack Father     Hyperlipidemia Father     Glaucoma Maternal Grandmother      I have reviewed and agree with the history as documented  Social History     Tobacco Use    Smoking status: Never Smoker    Smokeless tobacco: Never Used   Substance Use Topics    Alcohol use: Yes     Comment: social    Drug use: Never        Review of Systems   Constitutional: Negative  HENT: Negative  Eyes: Negative  Respiratory: Negative  Cardiovascular: Negative  Gastrointestinal: Negative  Endocrine: Negative  Genitourinary: Negative  Musculoskeletal:        Right wrist pain and right tib-fib pain   Skin: Positive for wound  Wounds of the left knee as well as right tib-fib region  Also has forehead wound centrally   Allergic/Immunologic: Negative  Neurological: Negative  Hematological: Negative  Psychiatric/Behavioral: Negative  All other systems reviewed and are negative  Physical Exam  Physical Exam   Constitutional: He is oriented to person, place, and time  He appears well-developed and well-nourished  No distress  HENT:   Head: Normocephalic  Right Ear: External ear normal    Left Ear: External ear normal    Nose: Nose normal    Mouth/Throat: No oropharyngeal exudate  Eyes: Pupils are equal, round, and reactive to light  Conjunctivae and EOM are normal  Right eye exhibits no discharge  Left eye exhibits no discharge  No scleral icterus  Pupils 4 mm and reactive bilaterally   Neck: Neck supple  No JVD present  No tracheal deviation present  No thyromegaly present     Cardiovascular: Normal rate, regular rhythm, normal heart sounds and intact distal pulses  Exam reveals no gallop and no friction rub  No murmur heard  Pulmonary/Chest: Effort normal and breath sounds normal  No stridor  No respiratory distress  He has no wheezes  He has no rales  He exhibits no tenderness  Abdominal: Soft  He exhibits no distension and no mass  There is no tenderness  There is no rebound and no guarding  No hernia  Musculoskeletal: He exhibits edema and tenderness  Arms:       Legs:  Lymphadenopathy:     He has no cervical adenopathy  Neurological: He is alert and oriented to person, place, and time  Skin: Capillary refill takes less than 2 seconds  He is not diaphoretic  Psychiatric: He has a normal mood and affect  Vitals reviewed        Vital Signs  ED Triage Vitals [02/26/19 0915]   Temperature Pulse Respirations Blood Pressure SpO2   98 7 °F (37 1 °C) 62 18 (!) 185/87 98 %      Temp Source Heart Rate Source Patient Position - Orthostatic VS BP Location FiO2 (%)   Temporal Monitor Sitting Left arm --      Pain Score       7           Vitals:    02/26/19 0945 02/26/19 1000 02/26/19 1015 02/26/19 1045   BP: 146/84 162/79 160/81 169/85   Pulse: 59 (!) 54 55 60   Patient Position - Orthostatic VS: Lying Sitting Sitting        Visual Acuity  Visual Acuity      Most Recent Value   L Pupil Size (mm)  3   R Pupil Size (mm)  3          ED Medications  Medications   sodium chloride 0 9 % bolus 1,000 mL (0 mL Intravenous Stopped 2/26/19 1029)   tetanus-diphtheria-acellular pertussis (BOOSTRIX) IM injection 0 5 mL (0 5 mL Intramuscular Given 2/26/19 1003)       Diagnostic Studies  Results Reviewed     Procedure Component Value Units Date/Time    Protime-INR [[id number]]  (Normal) Collected:  02/26/19 0930    Lab Status:  Final result Specimen:  Blood from Arm, Left Updated:  02/26/19 0948     Protime 12 7 seconds      INR 1 00    APTT [[id number]]  (Normal) Collected:  02/26/19 0930    Lab Status:  Final result Specimen:  Blood from Arm, Left Updated:  02/26/19 0948     PTT 29 seconds     Basic metabolic panel [[id number]]  (Abnormal) Collected:  02/26/19 0930    Lab Status:  Final result Specimen:  Blood from Arm, Left Updated:  02/26/19 0947     Sodium 141 mmol/L      Potassium 4 2 mmol/L      Chloride 104 mmol/L      CO2 30 mmol/L      ANION GAP 7 mmol/L      BUN 18 mg/dL      Creatinine 1 37 mg/dL      Glucose 112 mg/dL      Calcium 9 0 mg/dL      eGFR 55 ml/min/1 73sq m     Narrative:       National Kidney Disease Education Program recommendations are as follows:  GFR calculation is accurate only with a steady state creatinine  Chronic Kidney disease less than 60 ml/min/1 73 sq  meters  Kidney failure less than 15 ml/min/1 73 sq  meters  CBC and differential [[id number]]  (Abnormal) Collected:  02/26/19 0930    Lab Status:  Final result Specimen:  Blood from Arm, Left Updated:  02/26/19 0938     WBC 7 17 Thousand/uL      RBC 4 83 Million/uL      Hemoglobin 14 6 g/dL      Hematocrit 43 4 %      MCV 90 fL      MCH 30 2 pg      MCHC 33 6 g/dL      RDW 11 7 %      MPV 8 8 fL      Platelets 691 Thousands/uL      nRBC 0 /100 WBCs      Neutrophils Relative 64 %      Immat GRANS % 0 %      Lymphocytes Relative 25 %      Monocytes Relative 8 %      Eosinophils Relative 2 %      Basophils Relative 1 %      Neutrophils Absolute 4 63 Thousands/µL      Immature Grans Absolute 0 02 Thousand/uL      Lymphocytes Absolute 1 78 Thousands/µL      Monocytes Absolute 0 58 Thousand/µL      Eosinophils Absolute 0 12 Thousand/µL      Basophils Absolute 0 04 Thousands/µL                  XR tibia fibula 2 views RIGHT   ED Interpretation by Luis E Montez PA-C (02/26 1015)   Likely chronic deformity tibia mid shaft      Final Result by Fuentes Metz MD (02/26 1057)      No acute osseous abnormality              Workstation performed: [id number]         XR knee 4+ vw left injury   ED Interpretation by Luis E Montez PA-C (02/26 1012)   Image 3 notes a possible nondisplaced fracture of the patella  Otherwise intact knee  Final Result by Devin Epps MD (02/26 1056)      Suspicion of a nondisplaced fracture of the left patella noted on only one view  Findings concur with the referring clinician's preliminary interpretation already in the patient's electronic health record  Workstation performed: [id number]         XR wrist 3+ views RIGHT   ED Interpretation by Danilo Marsh PA-C (02/26 1015)   Impacted distal radius fracture      Final Result by Devin Epps MD (02/26 1054)      1  Comminuted intra-articular impacted and dorsally angulated distal right radial fracture   2  Ulnar styloid process fracture   3  Mildly displaced oblique fracture, distal right 5th metacarpal        The examination demonstrates a significant  finding and was documented as such in [facility] for liaison and referring practitioner notification  Workstation performed: [id number]         XR chest 1 view portable   ED Interpretation by Danilo Marsh PA-C (02/26 1014)   No evidence of acute cardiopulmonary process  No pulmonary contusion  Trachea midline  No pneumothorax noted      Final Result by Devin Epps MD (02/26 1053)      No acute cardiopulmonary disease  Workstation performed: [id number]         CT spine cervical without contrast   Final Result by Robbin Brown DO (02/26 5748)   No cervical spine fracture or traumatic malalignment  Workstation performed: [id number]         CT head without contrast   Final Result by Robbin Brown DO (02/26 6703)   No acute intracranial abnormality                    Workstation performed: [id number]                    Procedures  Procedures       Phone Contacts  ED Phone Contact    ED Course  ED Course as of Feb 26 1103   Tue Feb 26, 2019   0931 Blood Pressure(!): 185/87   0931 Temperature: 98 7 °F (37 1 °C)   0931 Pulse: 62   0931 Respirations: 18   0931 SpO2: 98 % 7182 Sodium: 141   0948 Potassium: 4 2   0948 CO2: 30   0948 Anion Gap: 7   0948 BUN: 18   0948 Creatinine(!): 1 37   0948 Glucose, Random: 112   0948 eGFR: 55   0948 WBC: 7 17   0948 Hemoglobin: 14 6   0948 Platelet Count: 455   0948 INR: 1 00   0948 Blood Pressure: 144/67   0948 Pulse: 64   0948 Respirations: 18   0948 Respirations: 18   0948 This point patient is refusing any pain medication  1011   Impression     No cervical spine fracture or traumatic malalignment  1011 Impression     No acute intracranial abnormality  1048 It is of note that the patient states this is a workman's compensation injury  He will follow up with his panel physician  He has absolutely no tenderness over the left patella  1102 Also noted the patient does have a mildly displaced distal right 5th metacarpal fracture  It is well splinted inside the OCL  He is aware  MDM    Disposition  Final diagnoses:   Distal radius fracture, right   Facial laceration, initial encounter   Left patella fracture   Metacarpal bone fracture - Right 5th distal     Time reflects when diagnosis was documented in both MDM as applicable and the Disposition within this note     Time User Action Codes Description Comment    2/26/2019 10:17 AM Sandhya DE LA VEGA Add [S52 501A] Distal radius fracture, right     2/26/2019 10:17 AM Sandhya DE LA VEGA Add [S01 81XA] Facial laceration, initial encounter     2/26/2019 10:17 AM Sandhya DE LA VEGA Add Dottie Smithst Left patella fracture     2/26/2019 11:02 AM Sandhya DE LA VEGA Add [C08 467W] Metacarpal bone fracture     2/26/2019 11:02 AM Sandhya DE LA VEGA Modify [S62 309A] Metacarpal bone fracture Right 5th distal      ED Disposition     ED Disposition Condition Date/Time Comment    Discharge Good Tue Feb 26, 2019 10:49 AM Joel Louise discharge to home/self care              Follow-up Information     Follow up With Specialties Details Why Contact Info workmans comp  Go in 1 day            Patient's Medications   Discharge Prescriptions    HYDROCODONE-ACETAMINOPHEN (NORCO) 5-325 MG PER TABLET    Take 1 tablet by mouth every 6 (six) hours as needed for pain for up to 10 daysMax Daily Amount: 4 tablets       Start Date: 2/26/2019 End Date: 3/8/2019       Order Dose: 1 tablet       Quantity: 8 tablet    Refills: 0     No discharge procedures on file      ED Provider  Electronically Signed by           Earl Friedman PA-C  02/26/19 [address] Efrain Estrella PA-C  02/26/19 110

## 2019-02-26 NOTE — TRAUMA DOCUMENTATION
Abrasions on forehead, right anterior lower leg cleaned with NSS  5cm laceration to forehead at hairline noted

## 2019-02-27 ENCOUNTER — PROCEDURE VISIT (OUTPATIENT)
Dept: UROLOGY | Facility: CLINIC | Age: 64
End: 2019-02-27
Payer: COMMERCIAL

## 2019-02-27 VITALS
HEART RATE: 72 BPM | DIASTOLIC BLOOD PRESSURE: 90 MMHG | BODY MASS INDEX: 33.52 KG/M2 | WEIGHT: 227 LBS | SYSTOLIC BLOOD PRESSURE: 140 MMHG

## 2019-02-27 DIAGNOSIS — R97.20 ELEVATED PSA: Primary | ICD-10-CM

## 2019-02-27 PROCEDURE — 88344 IMHCHEM/IMCYTCHM EA MLT ANTB: CPT | Performed by: PATHOLOGY

## 2019-02-27 PROCEDURE — G0416 PROSTATE BIOPSY, ANY MTHD: HCPCS | Performed by: PATHOLOGY

## 2019-02-27 PROCEDURE — 76942 ECHO GUIDE FOR BIOPSY: CPT | Performed by: UROLOGY

## 2019-02-27 PROCEDURE — 55700 PR BIOPSY OF PROSTATE,NEEDLE/PUNCH: CPT | Performed by: UROLOGY

## 2019-02-27 NOTE — PROGRESS NOTES
UROLOGY FOLLOWUP NOTE     CHIEF COMPLAINT   Sapna Kim is a 59 y o  male with a complaint of   Chief Complaint   Patient presents with    Elevated PSA       History of Present Illness:     59 y o  male with recent PSA check of 5 1  Patient has been undergoing health fair PSAs for the last handful of years  He has not had a digital rectal exam for the last 3 years  He is  and has no family history of prostate cancer  His father in law does have prostate cancer and has been treated with radiation and androgen deprivation therapy  He presents for discussion  He has some mild low level urinary urgency and hesitancy  He is not interested in treatment for this  He is very healthy and exercises and lift weights regularly  AUA SS 13 QoL 2    We continued to follow the patient's PSA after the initial visit and noted continued to rise  I discussed with the patient by phone my recommendation to proceed with a prostate biopsy and he agreed  He presents today for this procedure  Aspirin held  Patient works as a  and yesterday fell office 17 ft roof  He did break his radius and the med a carpal bone  He has some other scratches and bruises but is otherwise fine       PSA 3/17/2018 - 5 1  PSA 3/19/2017 - 3 9  PSA 3/20/2016 - 2 5  PSA 2015 - 3 7  PSA 2014 - 2 7  PSA 2013 - 2 5    Past Medical History:     Past Medical History:   Diagnosis Date    Hypertension        PAST SURGICAL HISTORY:     Past Surgical History:   Procedure Laterality Date    LEG SURGERY Right     tumor removal     PROSTATE BIOPSY  02/27/2019       CURRENT MEDICATIONS:     Current Outpatient Medications   Medication Sig Dispense Refill    HYDROcodone-acetaminophen (NORCO) 5-325 mg per tablet Take 1 tablet by mouth every 6 (six) hours as needed for pain for up to 10 daysMax Daily Amount: 4 tablets 8 tablet 0    losartan-hydrochlorothiazide (HYZAAR) 50-12 5 mg per tablet Take 1 tablet by mouth daily      MULTIPLE VITAMINS ESSENTIAL PO Take 1 tablet by mouth daily      aspirin 81 MG tablet Take by mouth      NON FORMULARY 2 (two) times a day Super Beta Prostate supplement      Zoster Vaccine Live (ZOSTAVAX) 54055 UNT/0 65ML SUSR Inject 0 5 mL under the skin       No current facility-administered medications for this visit          ALLERGIES:   No Known Allergies    SOCIAL HISTORY:     Social History     Socioeconomic History    Marital status: /Civil Union     Spouse name: None    Number of children: None    Years of education: None    Highest education level: None   Occupational History    None   Social Needs    Financial resource strain: None    Food insecurity:     Worry: None     Inability: None    Transportation needs:     Medical: None     Non-medical: None   Tobacco Use    Smoking status: Never Smoker    Smokeless tobacco: Never Used   Substance and Sexual Activity    Alcohol use: Yes     Comment: social    Drug use: Never    Sexual activity: None   Lifestyle    Physical activity:     Days per week: None     Minutes per session: None    Stress: None   Relationships    Social connections:     Talks on phone: None     Gets together: None     Attends Zoroastrian service: None     Active member of club or organization: None     Attends meetings of clubs or organizations: None     Relationship status: None    Intimate partner violence:     Fear of current or ex partner: None     Emotionally abused: None     Physically abused: None     Forced sexual activity: None   Other Topics Concern    None   Social History Narrative    Caffeine use    Dental care, regularly    Exercises 3 to 4 times per week    Lives independently with spouse    Sun protection sunscreen    Uses safety equipment-seatbelts           SOCIAL HISTORY:     Family History   Problem Relation Age of Onset    Glaucoma Mother     Hyperlipidemia Mother     Heart attack Father     Hyperlipidemia Father     Glaucoma Maternal Grandmother        REVIEW OF SYSTEMS:     Review of Systems   Constitutional: Negative  Respiratory: Negative  Cardiovascular: Negative  Gastrointestinal: Negative  Genitourinary: Positive for frequency and urgency  Musculoskeletal: Negative  Skin: Positive for wound (  Excoriation from recent traumatic accident)  Neurological: Negative  Psychiatric/Behavioral: Negative  PHYSICAL EXAM:     /90   Pulse 72   Wt 103 kg (227 lb)   BMI 33 52 kg/m²     General:  Healthy appearing male in no acute distress  They have a normal affect  There is not appear to be any gross neurologic defects or abnormalities  Multiple scratches of the face  HEENT:  Normocephalic, atraumatic  Neck is supple without any palpable lymphadenopathy  Cardiovascular:  Patient has normal palpable distal radial pulses  There is no significant peripheral edema  No JVD is noted  Respiratory:  Patient has unlabored respirations  There is no audible wheeze or rhonchi  Abdomen:  Abdomen is without surgical scars  Right anterior thigh scar  Abdomen is soft and nontender  There is no tympany  Inguinal and umbilical hernia are not appreciated  Genitourinary: no penile lesions or discharge, no testicular masses or tenderness, no hernias, TALYA broad based with left sided fullness but no discreet nodule    Musculoskeletal:  Patient does not have significant CVA tenderness in the  flank with palpation or percussion  They full range of motion in all 4 extremities  Strength in all 4 extremities appears congruent  Patient is able to ambulate without assistance or difficulty  Right wrist and lower arm in a Ace wrap  Dermatologic:  Patient has no skin abnormalities or rashes        LABS:     CBC:   Lab Results   Component Value Date    WBC 7 17 02/26/2019    HGB 14 6 02/26/2019    HCT 43 4 02/26/2019    MCV 90 02/26/2019     02/26/2019       BMP:   Lab Results   Component Value Date    CALCIUM 9 0 02/26/2019    K 4 2 02/26/2019    CO2 30 02/26/2019     02/26/2019    BUN 18 02/26/2019    CREATININE 1 37 (H) 02/26/2019     Lab Results   Component Value Date    PSA 6 2 (H) 12/07/2018    PSA 4 8 (H) 11/30/2018    PSA 3 1 06/13/2018     PSA 3/17/2018 - 5 1  PSA 3/19/2017 - 3 9  PSA 3/20/2016 - 2 5  PSA 2015 - 3 7  PSA 2014 - 2 7  PSA 2013 - 2 5    IMAGING:     NO  IMAGING    PROCEDURE:     See note    ASSESSMENT:     59 y o  male with elevated PSA    PLAN:       I discussed with the patient potential side effects from prostate biopsy including bleeding from his bladder, bleeding from his rectum, and bleeding in his semen up to about 2-4 weeks  The patient knows that should he have severe uncontrolled bleeding he is to call the office or proceed immediately to the emergency room  Additionally counseled the patient to monitor for fevers greater 100 3  He will finish out his course of antibiotics  Patient will return in 2 weeks for discussion of his pathology

## 2019-02-27 NOTE — PROGRESS NOTES
Biopsy prostate     Date/Time 2/27/2019 9:24 AM     Performed by  Love Solorzano MD     Authorized by Santo Morley PA-C       Consent: Verbal consent obtained  Written consent obtained  Risks and benefits: risks, benefits and alternatives were discussed  Consent given by: patient  Patient understanding: patient states understanding of the procedure being performed  Patient identity confirmed: verbally with patient      Local anesthesia used: yes      Anesthesia: local infiltration and nerve block     Anesthesia   Local anesthesia used: yes  Local Anesthetic: lidocaine 1% without epinephrine     Sedation   Patient sedated: no        Specimen: yes    Culture: no   Procedure Details   Procedure Notes:   Prostate Biopsy note: The patient returns to the office today to undergo a transrectal ultrasound-guided biopsy of the prostate secondary to  Elevated PSA  Risks and benefits of the procedure were discussed  Informed consent was obtained  The patient's prebiopsy preparation was deemed to be adequate  Antibiotics had been taken as prescribed  If appropriate blood thinners, had been placed on hold  The patient completed an enema as prescribed  The patient was placed in the lateral decubitus position  Digital rectal examination was performed revealing a  45 gram prostate  Viscous lidocaine jelly was instilled into the rectum  Transrectal ultrasonography was then performed  The prostate measured  51 7 grams  5 cc of 2% lidocaine were then injected bilaterally between the junction of the base of the prostate and the seminal vesicles  Ultrasound guidance was utilized to place the needle into proper position for the administration of the local anesthetic  A standard 12 core biopsy was then performed with one core from each the right later base, mid and apex; right medial base, mid and apex; left medial base, mid and apex; left lateral base, mid, apex      Direct pressure was held for 5 minutes for hemostasis  Overall the patient tolerated the procedure and there were no complications          Patient Transportation: confirmed  Patient tolerance: Patient tolerated the procedure well with no immediate complications

## 2019-02-27 NOTE — PATIENT INSTRUCTIONS
Prostate Biopsy   WHAT YOU NEED TO KNOW:   What do I need to know about a prostate biopsy? A prostate biopsy is a procedure to remove samples of tissue from your prostate gland  The prostate is a male sex gland that makes fluid found in semen  It is located just below the bladder  After the samples are removed, they are sent to a lab and tested for cancer  How do I prepare for a prostate biopsy? · Your healthcare provider will talk to you about how to prepare for this procedure  He may tell you not to eat or drink anything after midnight on the day of your surgery  You will need to stop taking blood thinners several days before your procedure  Examples of blood thinners include warfarin and NSAIDs  You may also need to stop taking herbal supplements before your procedure  Your healthcare provider will tell you what other medicines to take or not take on the day of your procedure  · You will be given an antibiotic to help prevent a bacterial infection  You may need to give yourself an enema (liquid medicine put in your rectum) to help empty your bowel before your procedure  What will happen during a prostate biopsy? · You may need to lie on your side with your knees pulled up toward your chest  Numbing cream or gel may be put into your rectum, or numbing medicine may be injected near your prostate  You may instead be given general anesthesia to keep you asleep and free from pain during the procedure  A biopsy sample may be taken through your rectum, urethra, or perineum  The perineum is the area between your scrotum and rectum  Most of the time, biopsy samples are taken through the rectum  · If your healthcare provider takes a sample through your rectum, he will insert a small ultrasound probe into your rectum  The ultrasound shows pictures of your prostate on a monitor, and is used to help guide the biopsy needle   Your healthcare provider will push the biopsy needle through the wall of your rectum and into your prostate gland  Your healthcare provider will remove between 6 to 12 samples of tissue from different areas of your prostate gland  The samples will be sent to a lab and tested for cancer  What will happen after a prostate biopsy? You may feel soreness at the biopsy site  You may need to take antibiotics for up to 2 days after your procedure to help prevent an infection  You may have some bleeding from your rectum  You may also have blood in your urine, bowel movements, or semen  What are the risks of a prostate biopsy? You may bleed more than expected or get an infection in your urinary tract or prostate gland  The infection may spread to your blood and the rest of your body  Your bladder may not empty completely when you urinate  You may need a catheter to help empty your bladder for a short period of time  Cancer cells may be missed during your biopsy procedure  You may need another prostate biopsy to check for cancer again  CARE AGREEMENT:   You have the right to help plan your care  Learn about your health condition and how it may be treated  Discuss treatment options with your caregivers to decide what care you want to receive  You always have the right to refuse treatment  The above information is an  only  It is not intended as medical advice for individual conditions or treatments  Talk to your doctor, nurse or pharmacist before following any medical regimen to see if it is safe and effective for you  © 2017 [employer] Information is for End User's use only and may not be sold, redistributed or otherwise used for commercial purposes  All illustrations and images included in CareNotes® are the copyrighted property of [employer]  or [employer]ul[employer]

## 2019-03-13 ENCOUNTER — TRANSCRIBE ORDERS (OUTPATIENT)
Dept: PHYSICAL THERAPY | Facility: CLINIC | Age: 64
End: 2019-03-13

## 2019-03-13 ENCOUNTER — EVALUATION (OUTPATIENT)
Dept: PHYSICAL THERAPY | Facility: CLINIC | Age: 64
End: 2019-03-13
Payer: OTHER MISCELLANEOUS

## 2019-03-13 DIAGNOSIS — S52.591D OTHER FRACTURES OF LOWER END OF RIGHT RADIUS, SUBSEQUENT ENCOUNTER FOR CLOSED FRACTURE WITH ROUTINE HEALING: Primary | ICD-10-CM

## 2019-03-13 DIAGNOSIS — M77.11 RIGHT LATERAL EPICONDYLITIS: Primary | ICD-10-CM

## 2019-03-13 PROCEDURE — 97162 PT EVAL MOD COMPLEX 30 MIN: CPT | Performed by: PHYSICAL THERAPIST

## 2019-03-13 PROCEDURE — L3808 WHFO, RIGID W/O JOINTS: HCPCS | Performed by: PHYSICAL THERAPIST

## 2019-03-13 PROCEDURE — 97140 MANUAL THERAPY 1/> REGIONS: CPT | Performed by: PHYSICAL THERAPIST

## 2019-03-13 PROCEDURE — 97535 SELF CARE MNGMENT TRAINING: CPT | Performed by: PHYSICAL THERAPIST

## 2019-03-13 PROCEDURE — 97110 THERAPEUTIC EXERCISES: CPT | Performed by: PHYSICAL THERAPIST

## 2019-03-13 PROCEDURE — 97112 NEUROMUSCULAR REEDUCATION: CPT | Performed by: PHYSICAL THERAPIST

## 2019-03-13 NOTE — LETTER
2019    MD Giovanna Emmanuel [address]     Patient: Evelyn Olivares   YOB: 1955   Date of Visit: 3/13/2019     Encounter Diagnosis     ICD-10-CM    1  Other fractures of lower end of right radius, subsequent encounter for closed fracture with routine healing S52 591D        Dear Dr Blaine Cummings:    Please review the attached Plan of Care from [facility] recent visit  Please verify that you agree therapy should continue by signing the attached document and sending it back to our office  If you have any questions or concerns, please don't hesitate to call  Sincerely,    Nohemi Hall, DPT, CHT    Referring Provider:      I certify that I have read the below Plan of Care and certify the need for these services furnished under this plan of treatment while under my care  MD Giovanna Emmanuel [address]: [phone]          PT Evaluation     Today's date: 3/13/2019  Patient name: Evelyn Olivares  : 1955  MRN: [id number]  Referring provider: Jonathon Villanueva MD  Dx:   Encounter Diagnosis     ICD-10-CM    1  Other fractures of lower end of right radius, subsequent encounter for closed fracture with routine healing S52 591D                   Assessment  Assessment details: Pt is a 58 YO male presenting to PT with pain, decreased AROM, strength and tolerance to activity  Pt would benefit from skilled intervention to address these issues and maximize overall function  Occupation- self employed construction; working modified duty  Dominant- right; Involved- right    Goals  ST  Decrease pain to 0-2/10 in 4 weeks            2  Decrease swelling in hand and FA with compression            3  Increase AROM to [facility] in 6 weeks;composite fist and extension/wrist motion            4     Maintain clean wound and promote healing            5   Provide orthotic for protection  LT  Increase functional motion and strength for independence with ADL and self care by DC            2  Ability to RTW full duty and recreational activity by DC    Plan  Frequency: 2x week  Duration in weeks: 4  Treatment plan discussed with: patient        Subjective Evaluation    History of Present Illness  Date of surgery: 3/7/2019  Mechanism of injury: Pt fell at work onto his right side with right DR fracture and fracture of neck of 5th MC   ORIF DR fracture with closed reduction of MC neck      Pain  Current pain ratin  At best pain ratin  At worst pain ratin  Location: right hand and wrist    Hand dominance: right    Treatments  Current treatment: physical therapy  Patient Goals  Patient goals for therapy: decreased edema, decreased pain, increased motion, increased strength, independence with ADLs/IADLs, return to sport/leisure activities and return to work          Objective     General Comments:      Wrist/Hand Comments  AROM right FA S/P- 3050; wrist E/F- 20/20 (wrist jogs); R/UD- 1015                      IF/LF MP- 20/60; PIP- 0/70; DIP- 0/40                      RF/SF- 45/60; PIP- 10/60; DIP- 0/40                      Thumb MP- 0/45; IP- 0/55  Strength- un-assessed per protocol  Sensation- intact to LT all digits  Circumference at wrist- 21 0 cm;MPs- 23 5 cm; RF P1- 7 5 cm; SF P1- 6 5 cm  PT removed post operative splint and replaced with SA radial/ulnar digit gutter to protect all fractures  Pt was instructed in HEP of elevation/ice, wound care, gentle wrist jogs and protected digit motion  Compression glove and sleeve were provided for swelling    Precautions: wear orthosis except for hygiene and exercise    Daily Treatment Diary     Manual  3/13            STM 15                                      isotoner  Lge/Lt           [facility] MAG                Exercise Diary  3/13            Wrist jogs 30 10            FA S/P 45/45 10            T/IF/LF  Modalities  3/13            HP/biph 15            CP 15

## 2019-03-13 NOTE — PROGRESS NOTES
PT Evaluation     Today's date: 3/13/2019  Patient name: Alvina Messina  : 1955  MRN: [id number]  Referring provider: Lon Lopez MD  Dx:   Encounter Diagnosis     ICD-10-CM    1  Other fractures of lower end of right radius, subsequent encounter for closed fracture with routine healing S52 591D                   Assessment  Assessment details: Pt is a 58 YO male presenting to PT with pain, decreased AROM, strength and tolerance to activity  Pt would benefit from skilled intervention to address these issues and maximize overall function  Occupation- self employed construction; working modified duty  Dominant- right; Involved- right    Goals  ST  Decrease pain to 0-2/10 in 4 weeks            2  Decrease swelling in hand and FA with compression            3  Increase AROM to [email or web address] in 6 weeks;composite fist and extension/wrist motion            4  Maintain clean wound and promote healing            5   Provide orthotic for protection  LT  Increase functional motion and strength for independence with ADL and self care by DC            2  Ability to RTW full duty and recreational activity by DC    Plan  Frequency: 2x week  Duration in weeks: 4  Treatment plan discussed with: patient        Subjective Evaluation    History of Present Illness  Date of surgery: 3/7/2019  Mechanism of injury: Pt fell at work onto his right side with right DR fracture and fracture of neck of 5th    ORIF DR fracture with closed reduction of MC neck      Pain  Current pain ratin  At best pain ratin  At worst pain ratin  Location: right hand and wrist    Hand dominance: right    Treatments  Current treatment: physical therapy  Patient Goals  Patient goals for therapy: decreased edema, decreased pain, increased motion, increased strength, independence with ADLs/IADLs, return to sport/leisure activities and return to work          Objective     General Comments:      Wrist/Hand Comments  AROM right FA S/P- 30/50; wrist E/F- 20/20 (wrist jogs); R/UD- 10/15                      IF/LF MP- 20/60; PIP- 0/70; DIP- 0/40                      RF/SF- 45/60; PIP- 10/60; DIP- 0/40                      Thumb MP- 0/45; IP- 0/55  Strength- un-assessed per protocol  Sensation- intact to LT all digits  Circumference at wrist- 21 0 cm;MPs- 23 5 cm; RF P1- 7 5 cm; SF P1- 6 5 cm  PT removed post operative splint and replaced with SA radial/ulnar digit gutter to protect all fractures  Pt was instructed in HEP of elevation/ice, wound care, gentle wrist jogs and protected digit motion  Compression glove and sleeve were provided for swelling    Precautions: wear orthosis except for hygiene and exercise    Daily Treatment Diary     Manual  3/13            STM 15                                      isotoner  Lge/Lt           [facility] MAG                Exercise Diary  3/13            Wrist jogs 30/30 10            FA S/P 45/45 10            T/IF/LF 2/5                                                                                                                                                                                                                                             Modalities  3/13            HP/biph 15            CP 15

## 2019-03-19 ENCOUNTER — OFFICE VISIT (OUTPATIENT)
Dept: UROLOGY | Facility: CLINIC | Age: 64
End: 2019-03-19
Payer: COMMERCIAL

## 2019-03-19 ENCOUNTER — OFFICE VISIT (OUTPATIENT)
Dept: PHYSICAL THERAPY | Facility: CLINIC | Age: 64
End: 2019-03-19
Payer: OTHER MISCELLANEOUS

## 2019-03-19 VITALS
DIASTOLIC BLOOD PRESSURE: 70 MMHG | HEART RATE: 60 BPM | BODY MASS INDEX: 32.64 KG/M2 | SYSTOLIC BLOOD PRESSURE: 100 MMHG | WEIGHT: 221 LBS

## 2019-03-19 DIAGNOSIS — C61 PROSTATE CANCER (HCC): Primary | ICD-10-CM

## 2019-03-19 DIAGNOSIS — S52.591D OTHER FRACTURES OF LOWER END OF RIGHT RADIUS, SUBSEQUENT ENCOUNTER FOR CLOSED FRACTURE WITH ROUTINE HEALING: Primary | ICD-10-CM

## 2019-03-19 PROCEDURE — 99214 OFFICE O/P EST MOD 30 MIN: CPT | Performed by: UROLOGY

## 2019-03-19 PROCEDURE — 97112 NEUROMUSCULAR REEDUCATION: CPT

## 2019-03-19 PROCEDURE — 97535 SELF CARE MNGMENT TRAINING: CPT

## 2019-03-19 PROCEDURE — 97110 THERAPEUTIC EXERCISES: CPT

## 2019-03-19 PROCEDURE — 97140 MANUAL THERAPY 1/> REGIONS: CPT

## 2019-03-19 PROCEDURE — 97010 HOT OR COLD PACKS THERAPY: CPT

## 2019-03-19 NOTE — PROGRESS NOTES
Daily Note     Today's date: 3/19/2019  Patient name: Isaiah Emanuel  : 1955  MRN: [id number]  Referring provider: Hank Zee MD  Dx:   Encounter Diagnosis     ICD-10-CM    1  Other fractures of lower end of right radius, subsequent encounter for closed fracture with routine healing S58 581G                   Subjective: Pt reports compliancy with HEP  Pt seen M/D and will have a F/U April  M/D pleased with initial progress       Objective:   General Comments:        Wrist/Hand Comments  AROM right FA S/P- ; wrist E/F-  (wrist jogs); R/UD- 10/15                      IF/LF MP- 20/60; PIP- 0/70; DIP- 0/40                      RF/SF- 4560; PIP- 10/60; DIP- 0/40                      Thumb MP- 0/45; IP- 0/55  Strength- un-assessed per protocol  Sensation- intact to LT all digits  Circumference at wrist- 21 0 cm;MPs- 23 5 cm; RF P1- 7 5 cm; SF P1- 6 5 cm  PT removed post operative splint and replaced with SA radial/ulnar digit gutter to protect all fractures  Pt was instructed in HEP of elevation/ice, wound care, gentle wrist jogs and protected digit motion  Compression glove and sleeve were provided for swelling     Precautions: wear orthosis except for hygiene and exercise     Daily Treatment Diary      Manual  3/13  3/19                   STM 15  15                                                                   isotoner   Lge/Lt                   [facility] MAG                           Exercise Diary  3/13  3/19                   Wrist jogs  10  10x                   FA S/P 45/45 10  10x                   T/IF/LF                                                                                                                                                                                                                                                                                                                                                                                                                                                Modalities  3/13  3/19                   HP/biph 15  15                   CP 15  15                                                          Assessment: Tolerated treatment well  Pt responding well to initial edema control  Pt compliant with HEP  Ortho modification performed by PT  Tubigrip also supplied  Plan: Progress treatment as tolerated

## 2019-03-19 NOTE — PROGRESS NOTES
UROLOGY FOLLOWUP NOTE     CHIEF COMPLAINT   Isabel Hartmann is a 59 y o  male with a complaint of   Chief Complaint   Patient presents with    Elevated PSA     here to review path results       History of Present Illness:     59 y o  male with recent PSA check of 5 1  Patient has been undergoing health fair PSAs for the last handful of years  He has not had a digital rectal exam for the last 3 years  He is  and has no family history of prostate cancer  His father in law does have prostate cancer and has been treated with radiation and androgen deprivation therapy  He presents for discussion  He has some mild low level urinary urgency and hesitancy  He is not interested in treatment for this  He is very healthy and exercises and lift weights regularly  AUA SS 13 QoL 2    We continued to follow the patient's PSA after the initial visit and noted continued to rise  I discussed with the patient by phone my recommendation to proceed with a prostate biopsy and he agreed  He presents today for this procedure  Aspirin held  Patient works as a  and yesterday fell office 17 ft roof  He did break his radius and the med a carpal bone  He has some other scratches and bruises but is otherwise fine  Continues to heal from his accident  Underwent his biopsy and returns today to discuss pathology      PSA 3/17/2018 - 5 1  PSA 3/19/2017 - 3 9  PSA 3/20/2016 - 2 5  PSA 2015 - 3 7  PSA 2014 - 2 7  PSA 2013 - 2 5    Past Medical History:     Past Medical History:   Diagnosis Date    Hypertension        PAST SURGICAL HISTORY:     Past Surgical History:   Procedure Laterality Date    LEG SURGERY Right     tumor removal     PROSTATE BIOPSY  02/27/2019       CURRENT MEDICATIONS:     Current Outpatient Medications   Medication Sig Dispense Refill    aspirin 81 MG tablet Take by mouth      losartan-hydrochlorothiazide (HYZAAR) 50-12 5 mg per tablet Take 1 tablet by mouth daily  MULTIPLE VITAMINS ESSENTIAL PO Take 1 tablet by mouth daily      NON FORMULARY 2 (two) times a day Super Beta Prostate supplement      Zoster Vaccine Live (ZOSTAVAX) 61187 UNT/0 65ML SUSR Inject 0 5 mL under the skin       No current facility-administered medications for this visit          ALLERGIES:   No Known Allergies    SOCIAL HISTORY:     Social History     Socioeconomic History    Marital status: /Civil Union     Spouse name: None    Number of children: None    Years of education: None    Highest education level: None   Occupational History    None   Social Needs    Financial resource strain: None    Food insecurity:     Worry: None     Inability: None    Transportation needs:     Medical: None     Non-medical: None   Tobacco Use    Smoking status: Never Smoker    Smokeless tobacco: Never Used   Substance and Sexual Activity    Alcohol use: Yes     Comment: social    Drug use: Never    Sexual activity: None   Lifestyle    Physical activity:     Days per week: None     Minutes per session: None    Stress: None   Relationships    Social connections:     Talks on phone: None     Gets together: None     Attends [employer] service: None     Active member of club or organization: None     Attends meetings of clubs or organizations: None     Relationship status: None    Intimate partner violence:     Fear of current or ex partner: None     Emotionally abused: None     Physically abused: None     Forced sexual activity: None   Other Topics Concern    None   Social History Narrative    Caffeine use    Dental care, regularly    Exercises 3 to 4 times per week    Lives independently with spouse    Sun protection sunscreen    Uses safety equipment-seatbelts           SOCIAL HISTORY:     Family History   Problem Relation Age of Onset    Glaucoma Mother     Hyperlipidemia Mother     Heart attack Father     Hyperlipidemia Father     Glaucoma Maternal Grandmother        REVIEW OF SYSTEMS: Review of Systems   Constitutional: Negative  Respiratory: Negative  Cardiovascular: Negative  Gastrointestinal: Negative  Genitourinary: Positive for frequency and urgency  Musculoskeletal: Negative  Skin: Positive for wound (  Excoriation from recent traumatic accident)  Neurological: Negative  Psychiatric/Behavioral: Negative  PHYSICAL EXAM:     /70   Pulse 60   Wt 100 kg (221 lb)   BMI 32 64 kg/m²     General:  Healthy appearing male in no acute distress  They have a normal affect  There is not appear to be any gross neurologic defects or abnormalities  Multiple scratches of the face  HEENT:  Normocephalic, atraumatic  Neck is supple without any palpable lymphadenopathy  Cardiovascular:  Patient has normal palpable distal radial pulses  There is no significant peripheral edema  No JVD is noted  Respiratory:  Patient has unlabored respirations  There is no audible wheeze or rhonchi  Abdomen:  Abdomen is without surgical scars  Right anterior thigh scar  Abdomen is soft and nontender  There is no tympany  Inguinal and umbilical hernia are not appreciated  Genitourinary: no penile lesions or discharge, no testicular masses or tenderness, no hernias, TALYA broad based with left sided fullness but no discreet nodule    Musculoskeletal:  Patient does not have significant CVA tenderness in the  flank with palpation or percussion  They full range of motion in all 4 extremities  Strength in all 4 extremities appears congruent  Patient is able to ambulate without assistance or difficulty  Right wrist and lower arm in a Ace wrap  Dermatologic:  Patient has no skin abnormalities or rashes        LABS:     CBC:   Lab Results   Component Value Date    WBC 7 17 02/26/2019    HGB 14 6 02/26/2019    HCT 43 4 02/26/2019    MCV 90 02/26/2019     02/26/2019       BMP:   Lab Results   Component Value Date    CALCIUM 9 0 02/26/2019    K 4 2 02/26/2019    CO2 30 02/26/2019  02/26/2019    BUN 18 02/26/2019    CREATININE 1 37 (H) 02/26/2019     Lab Results   Component Value Date    PSA 6 2 (H) 12/07/2018    PSA 4 8 (H) 11/30/2018    PSA 3 1 06/13/2018     PSA 3/17/2018 - 5 1  PSA 3/19/2017 - 3 9  PSA 3/20/2016 - 2 5  PSA 2015 - 3 7  PSA 2014 - 2 7  PSA 2013 - 2 5    IMAGING:     NO  IMAGING    PROCEDURE:     2/27/19 - Read by Dr Yris Cedillo in review  Final Diagnosis   A  Prostate, left lateral base, biopsy:     - No significant pathologic abnormalities  - No high grade prostatic intraepithelial neoplasia (HGPIN) or carcinoma identified      B  Prostate, left lateral mid, biopsy:     - No significant pathologic abnormalities  - No high grade prostatic intraepithelial neoplasia (HGPIN) or carcinoma identified      C  Prostate, left lateral apex, biopsy:     - Small focus of prostatic adenocarcinoma, francisco j score 3+3=6 (grade Group 1), involving less than 5% of        one (1) core  See note      D  Prostate, left medial base, biopsy:     - No significant pathologic abnormalities  - No high grade prostatic intraepithelial neoplasia (HGPIN) or carcinoma identified      E  Prostate, left medial mid, biopsy:     - No significant pathologic abnormalities  - No high grade prostatic intraepithelial neoplasia (HGPIN) or carcinoma identified      F  Prostate, left medial apex, biopsy:     - No significant pathologic abnormalities  - No high grade prostatic intraepithelial neoplasia (HGPIN) or carcinoma identified      G  Prostate, right lateral base, biopsy:     - No significant pathologic abnormalities  - No high grade prostatic intraepithelial neoplasia (HGPIN) or carcinoma identified      H  Prostate, right lateral mid, biopsy:     - Prostatic adenocarcinoma, [place] score 3+3+6 (Grade Group 1)Discontinuously involving 10% of        one (1) core  See note      I  Prostate, right lateral apex, biopsy:     - No significant pathologic abnormalities  - No high grade prostatic intraepithelial neoplasia (HGPIN) or carcinoma identified      J   Prostate, right medial base, biopsy:     - No significant pathologic abnormalities  - No high grade prostatic intraepithelial neoplasia (HGPIN) or carcinoma identified      K  Prostate, right medial mid, biopsy:     - No significant pathologic abnormalities  - No high grade prostatic intraepithelial neoplasia (HGPIN) or carcinoma identified      L  Prostate, right medial apex, biopsy     - No significant pathologic abnormalities  - No high grade prostatic intraepithelial neoplasia (HGPIN) or carcinoma identified  ASSESSMENT:     59 y o  male with low volume very low risk cT1c adenocarcinoma of the prostate    PLAN:       I had a very lengthy discussion with the patient detailing the pathology from his prostate biopsy  At this point time, he has a [id number] prostate cancer, Ish[place] score  3+3=6 in 2/12 cores, 5 and 10%  This can be considered a very low risk prostate cancer  We discussed all potential options for the treatment of prostate cancer  These include watchful waiting, active surveillance, local thermal therapies like cryoablation or high-frequency ultrasound, surgical extirpation via open and robotic approaches, and external beam radiation  I gave the patient a brief overview of each of these options  I have provided the patient a copy of the pathology report  I've offered the patient referrals to radiation oncology or options for second opinions  I've given the patient resources to more completely understand their diagnosis  Based on their NCCN risk profile, I have not opted to obtain additional imaging  I will plan to see the patient back within the month to further discuss their decision for treatment after they have had time to digest their diagnosis  All questions and concerns have been answered   I've made myself available for a sooner appointment or available to discuss further by phone  the patient I have discussed active surveillance at length any seems inclined towards this option  He will discuss with his wife and return to see me in 1 month unless other concerns arise

## 2019-03-22 ENCOUNTER — OFFICE VISIT (OUTPATIENT)
Dept: PHYSICAL THERAPY | Facility: CLINIC | Age: 64
End: 2019-03-22
Payer: OTHER MISCELLANEOUS

## 2019-03-22 DIAGNOSIS — S52.591D OTHER FRACTURES OF LOWER END OF RIGHT RADIUS, SUBSEQUENT ENCOUNTER FOR CLOSED FRACTURE WITH ROUTINE HEALING: Primary | ICD-10-CM

## 2019-03-22 PROCEDURE — 97535 SELF CARE MNGMENT TRAINING: CPT

## 2019-03-22 PROCEDURE — 97140 MANUAL THERAPY 1/> REGIONS: CPT

## 2019-03-22 PROCEDURE — 97010 HOT OR COLD PACKS THERAPY: CPT

## 2019-03-22 PROCEDURE — 97110 THERAPEUTIC EXERCISES: CPT

## 2019-03-22 PROCEDURE — 97112 NEUROMUSCULAR REEDUCATION: CPT

## 2019-03-22 NOTE — PROGRESS NOTES
Daily Note     Today's date: 3/22/2019  Patient name: Ashlee Ramirez  : 1955  MRN: [id number]  Referring provider: Kimberly Gold MD  Dx:   Encounter Diagnosis     ICD-10-CM    1  Other fractures of lower end of right radius, subsequent encounter for closed fracture with routine healing S58 401O                   Subjective: Pt reports compliancy with HEP  "My wrist feels pretty good and the swelling appears to be going down      Objective:   Wrist/Hand Comments  AROM right FA S/P- ; wrist E/F-  (wrist jogs); R/UD- 10/15                      IF/LF MP- 20/60; PIP- 0/70; DIP- 0/40                      RF/SF- 4560; PIP- 10/60; DIP- 0/40                      Thumb MP- 0/45; IP- 0/55  Strength- un-assessed per protocol  Sensation- intact to LT all digits  Circumference at wrist- 21 0 cm;MPs- 23 5 cm; RF P1- 7 5 cm; SF P1- 6 5 cm  PT removed post operative splint and replaced with SA radial/ulnar digit gutter to protect all fractures  Pt was instructed in HEP of elevation/ice, wound care, gentle wrist jogs and protected digit motion  Compression glove and sleeve were provided for swelling     Precautions: wear orthosis except for hygiene and exercise     Daily Treatment Diary      [facility] 3/13  3/19  3/22                 STM 15  15  15                                                                 isotoner   Lge/Lt                   [facility]                           Exercise Diary  3/13  3/19  3/22                 Wrist jogs 30/30 10  10x  10x                 FA S/P 45/45 10  10x  10x                 T/IF/LF                                                                                                                                                                                                                                                                                                                                                                                                                                                Modalities  3/13  3/19  3/22                 HP/biph 15  15 15                 CP 15  15  15                                                           Assessment: Tolerated treatment well  Pt responding well to initial edema control  Pt compliant with HEP  Pt instructed for no 4th and 5th finger  Digit ext    Pt to see M/D April 3rd     Plan: Progress treatment as tolerated

## 2019-03-26 ENCOUNTER — OFFICE VISIT (OUTPATIENT)
Dept: PHYSICAL THERAPY | Facility: CLINIC | Age: 64
End: 2019-03-26
Payer: OTHER MISCELLANEOUS

## 2019-03-26 DIAGNOSIS — S52.591D OTHER FRACTURES OF LOWER END OF RIGHT RADIUS, SUBSEQUENT ENCOUNTER FOR CLOSED FRACTURE WITH ROUTINE HEALING: Primary | ICD-10-CM

## 2019-03-26 PROCEDURE — 97535 SELF CARE MNGMENT TRAINING: CPT

## 2019-03-26 PROCEDURE — 97112 NEUROMUSCULAR REEDUCATION: CPT

## 2019-03-26 PROCEDURE — 97110 THERAPEUTIC EXERCISES: CPT

## 2019-03-26 PROCEDURE — 97010 HOT OR COLD PACKS THERAPY: CPT

## 2019-03-26 PROCEDURE — 97140 MANUAL THERAPY 1/> REGIONS: CPT

## 2019-03-26 NOTE — PROGRESS NOTES
Daily Note     Today's date: 3/26/2019  Patient name: Edilma Spain  : 1955  MRN: [id number]  Referring provider: Precious Rios MD  Dx:   Encounter Diagnosis     ICD-10-CM    1  Other fractures of lower end of right radius, subsequent encounter for closed fracture with routine healing S55 228X                   Subjective: Pt inquisitive about starting activity  Pt instructed on contraindicators in clinic  Pt will see M/D   Minimal  Discomfort in wrist and fingers  Objective:Precautions: wear orthosis except for hygiene and exercise     Daily Treatment Diary      [facility] 3/13  3/19  3/22  3/26               STM 15  15  15  15                                                               isotoner   Lge/Lt                   [facility]                           Exercise Diary  3/13  3/19  3/22  3/26               Wrist jogs 30/30 10  10x  10x  10x               FA S/P 45/45 10  10x  10x  10x               T/IF/LF 2/  2/  2/  2/                                                                                                                                                                                                                                                                                                                                                                                                                                             Modalities  3/13  3/19  3/22  3/26               HP/biph 15  15 15  15               CP 15  15  15  15                                                         Assessment: Tolerated treatment well  Pt responding well to initial edema control  Pt compliant with HEP   Pt instructed for no 4th and 5th finger  digit ext   Pt to see M/D      Plan: Progress treatment as tolerated

## 2019-03-28 ENCOUNTER — TELEPHONE (OUTPATIENT)
Dept: INTERNAL MEDICINE CLINIC | Facility: CLINIC | Age: 64
End: 2019-03-28

## 2019-03-28 NOTE — TELEPHONE ENCOUNTER
Pt was informed of lab results  Dr states that cholesterol is elevated and is much higher than last check  Recommends increase water intake, and atorvastatin 20mg daily to improve  Pt states that he wants to try to lower his cholesterol by increasing water intake, diet/exercise  Pt states that he has lowered his cholesterol in the past by changing diet/ exercise so he rather do that again an not take new rx

## 2019-03-29 ENCOUNTER — OFFICE VISIT (OUTPATIENT)
Dept: PHYSICAL THERAPY | Facility: CLINIC | Age: 64
End: 2019-03-29
Payer: OTHER MISCELLANEOUS

## 2019-03-29 DIAGNOSIS — S52.591D OTHER FRACTURES OF LOWER END OF RIGHT RADIUS, SUBSEQUENT ENCOUNTER FOR CLOSED FRACTURE WITH ROUTINE HEALING: Primary | ICD-10-CM

## 2019-03-29 PROCEDURE — 97535 SELF CARE MNGMENT TRAINING: CPT

## 2019-03-29 PROCEDURE — 97112 NEUROMUSCULAR REEDUCATION: CPT

## 2019-03-29 PROCEDURE — 97010 HOT OR COLD PACKS THERAPY: CPT

## 2019-03-29 PROCEDURE — 97110 THERAPEUTIC EXERCISES: CPT

## 2019-03-29 PROCEDURE — 97140 MANUAL THERAPY 1/> REGIONS: CPT

## 2019-03-29 NOTE — PROGRESS NOTES
Daily Note     Today's date: 3/29/2019  Patient name: Annabelle Becker  : 1955  MRN: [id number]  Referring provider: Leonid Ty MD  Dx:   Encounter Diagnosis     ICD-10-CM    1  Other fractures of lower end of right radius, subsequent encounter for closed fracture with routine healing S52 512K                   Subjective: Pt reports compliancy with HEP  Pt being careful with hand and utilizing edema techniques  Pt will see M/D next Wednesday  Objective: Precautions: wear orthosis except for hygiene and exercise     Daily Treatment Diary      [facility] 3/13  3/19  3/22  3/26  3/29             STM 15  15  15  15  15                                                             isotoner   Lge/Lt     dk R             [facility]                           Exercise Diary  3/13  3/19  3/22  3/26  3/29             Wrist jogs 3030 10  10x  10x  10x  10x             FA S/P 45/45 10  10x  10x  10x  10x             T/IF/LF 2/5  2/5  2/5  2/  2/5                                                                                                                                                                                                                                                                                                                                                                                                                                           Modalities  3/13  3/19  3/22  3/26  3/29             HP/biph 15  15 15  15  15             CP 15  15  15  15  15                                                       Assessment: Tolerated treatment well  Pt responding well to initial edema control  Pt compliant with HEP     Pt to see M/D        Plan: Progress treatment as tolerated

## 2019-04-02 ENCOUNTER — DOCUMENTATION (OUTPATIENT)
Dept: UROLOGY | Facility: AMBULATORY SURGERY CENTER | Age: 64
End: 2019-04-02

## 2019-04-02 ENCOUNTER — OFFICE VISIT (OUTPATIENT)
Dept: PHYSICAL THERAPY | Facility: CLINIC | Age: 64
End: 2019-04-02
Payer: OTHER MISCELLANEOUS

## 2019-04-02 ENCOUNTER — TRANSCRIBE ORDERS (OUTPATIENT)
Dept: PHYSICAL THERAPY | Facility: CLINIC | Age: 64
End: 2019-04-02

## 2019-04-02 DIAGNOSIS — S52.591D OTHER FRACTURES OF LOWER END OF RIGHT RADIUS, SUBSEQUENT ENCOUNTER FOR CLOSED FRACTURE WITH ROUTINE HEALING: Primary | ICD-10-CM

## 2019-04-02 DIAGNOSIS — S52.591D: Primary | ICD-10-CM

## 2019-04-02 PROCEDURE — 97112 NEUROMUSCULAR REEDUCATION: CPT | Performed by: PHYSICAL THERAPIST

## 2019-04-02 PROCEDURE — 97140 MANUAL THERAPY 1/> REGIONS: CPT | Performed by: PHYSICAL THERAPIST

## 2019-04-02 PROCEDURE — 97010 HOT OR COLD PACKS THERAPY: CPT | Performed by: PHYSICAL THERAPIST

## 2019-04-02 PROCEDURE — 97110 THERAPEUTIC EXERCISES: CPT | Performed by: PHYSICAL THERAPIST

## 2019-04-05 ENCOUNTER — OFFICE VISIT (OUTPATIENT)
Dept: PHYSICAL THERAPY | Facility: CLINIC | Age: 64
End: 2019-04-05
Payer: OTHER MISCELLANEOUS

## 2019-04-05 DIAGNOSIS — S52.591D OTHER FRACTURES OF LOWER END OF RIGHT RADIUS, SUBSEQUENT ENCOUNTER FOR CLOSED FRACTURE WITH ROUTINE HEALING: Primary | ICD-10-CM

## 2019-04-05 PROCEDURE — 97112 NEUROMUSCULAR REEDUCATION: CPT

## 2019-04-05 PROCEDURE — 97535 SELF CARE MNGMENT TRAINING: CPT

## 2019-04-05 PROCEDURE — 97140 MANUAL THERAPY 1/> REGIONS: CPT

## 2019-04-05 PROCEDURE — 97110 THERAPEUTIC EXERCISES: CPT

## 2019-04-05 PROCEDURE — 97035 APP MDLTY 1+ULTRASOUND EA 15: CPT

## 2019-04-09 ENCOUNTER — OFFICE VISIT (OUTPATIENT)
Dept: PHYSICAL THERAPY | Facility: CLINIC | Age: 64
End: 2019-04-09
Payer: OTHER MISCELLANEOUS

## 2019-04-09 DIAGNOSIS — S52.591D OTHER FRACTURES OF LOWER END OF RIGHT RADIUS, SUBSEQUENT ENCOUNTER FOR CLOSED FRACTURE WITH ROUTINE HEALING: Primary | ICD-10-CM

## 2019-04-09 PROCEDURE — 97112 NEUROMUSCULAR REEDUCATION: CPT

## 2019-04-09 PROCEDURE — 97035 APP MDLTY 1+ULTRASOUND EA 15: CPT

## 2019-04-09 PROCEDURE — 97110 THERAPEUTIC EXERCISES: CPT

## 2019-04-09 PROCEDURE — 97140 MANUAL THERAPY 1/> REGIONS: CPT

## 2019-04-09 PROCEDURE — 97010 HOT OR COLD PACKS THERAPY: CPT

## 2019-04-12 ENCOUNTER — OFFICE VISIT (OUTPATIENT)
Dept: PHYSICAL THERAPY | Facility: CLINIC | Age: 64
End: 2019-04-12
Payer: OTHER MISCELLANEOUS

## 2019-04-12 DIAGNOSIS — S52.591D OTHER FRACTURES OF LOWER END OF RIGHT RADIUS, SUBSEQUENT ENCOUNTER FOR CLOSED FRACTURE WITH ROUTINE HEALING: Primary | ICD-10-CM

## 2019-04-12 PROCEDURE — 97110 THERAPEUTIC EXERCISES: CPT

## 2019-04-12 PROCEDURE — 97035 APP MDLTY 1+ULTRASOUND EA 15: CPT

## 2019-04-12 PROCEDURE — 97140 MANUAL THERAPY 1/> REGIONS: CPT

## 2019-04-12 PROCEDURE — 97010 HOT OR COLD PACKS THERAPY: CPT

## 2019-04-12 PROCEDURE — 97112 NEUROMUSCULAR REEDUCATION: CPT

## 2019-04-16 ENCOUNTER — OFFICE VISIT (OUTPATIENT)
Dept: PHYSICAL THERAPY | Facility: CLINIC | Age: 64
End: 2019-04-16
Payer: OTHER MISCELLANEOUS

## 2019-04-16 DIAGNOSIS — S52.591D OTHER FRACTURES OF LOWER END OF RIGHT RADIUS, SUBSEQUENT ENCOUNTER FOR CLOSED FRACTURE WITH ROUTINE HEALING: Primary | ICD-10-CM

## 2019-04-16 PROCEDURE — 97035 APP MDLTY 1+ULTRASOUND EA 15: CPT

## 2019-04-16 PROCEDURE — 97112 NEUROMUSCULAR REEDUCATION: CPT

## 2019-04-16 PROCEDURE — 97140 MANUAL THERAPY 1/> REGIONS: CPT

## 2019-04-16 PROCEDURE — 97535 SELF CARE MNGMENT TRAINING: CPT

## 2019-04-16 PROCEDURE — 97110 THERAPEUTIC EXERCISES: CPT

## 2019-04-16 PROCEDURE — 97010 HOT OR COLD PACKS THERAPY: CPT

## 2019-04-19 ENCOUNTER — OFFICE VISIT (OUTPATIENT)
Dept: PHYSICAL THERAPY | Facility: CLINIC | Age: 64
End: 2019-04-19
Payer: OTHER MISCELLANEOUS

## 2019-04-19 DIAGNOSIS — S52.591D OTHER FRACTURES OF LOWER END OF RIGHT RADIUS, SUBSEQUENT ENCOUNTER FOR CLOSED FRACTURE WITH ROUTINE HEALING: Primary | ICD-10-CM

## 2019-04-19 PROCEDURE — 97010 HOT OR COLD PACKS THERAPY: CPT

## 2019-04-19 PROCEDURE — 97112 NEUROMUSCULAR REEDUCATION: CPT

## 2019-04-19 PROCEDURE — 97110 THERAPEUTIC EXERCISES: CPT

## 2019-04-19 PROCEDURE — 97035 APP MDLTY 1+ULTRASOUND EA 15: CPT

## 2019-04-19 PROCEDURE — 97140 MANUAL THERAPY 1/> REGIONS: CPT

## 2019-04-23 ENCOUNTER — TRANSCRIBE ORDERS (OUTPATIENT)
Dept: PHYSICAL THERAPY | Facility: CLINIC | Age: 64
End: 2019-04-23

## 2019-04-23 ENCOUNTER — OFFICE VISIT (OUTPATIENT)
Dept: PHYSICAL THERAPY | Facility: CLINIC | Age: 64
End: 2019-04-23
Payer: OTHER MISCELLANEOUS

## 2019-04-23 DIAGNOSIS — S52.591D OTHER FRACTURES OF LOWER END OF RIGHT RADIUS, SUBSEQUENT ENCOUNTER FOR CLOSED FRACTURE WITH ROUTINE HEALING: Primary | ICD-10-CM

## 2019-04-23 DIAGNOSIS — S52.591D: Primary | ICD-10-CM

## 2019-04-23 PROCEDURE — 97010 HOT OR COLD PACKS THERAPY: CPT | Performed by: PHYSICAL THERAPIST

## 2019-04-23 PROCEDURE — 97140 MANUAL THERAPY 1/> REGIONS: CPT | Performed by: PHYSICAL THERAPIST

## 2019-04-23 PROCEDURE — 97112 NEUROMUSCULAR REEDUCATION: CPT | Performed by: PHYSICAL THERAPIST

## 2019-04-23 PROCEDURE — 97110 THERAPEUTIC EXERCISES: CPT | Performed by: PHYSICAL THERAPIST

## 2019-04-23 PROCEDURE — 97035 APP MDLTY 1+ULTRASOUND EA 15: CPT | Performed by: PHYSICAL THERAPIST

## 2019-04-23 PROCEDURE — 97535 SELF CARE MNGMENT TRAINING: CPT | Performed by: PHYSICAL THERAPIST

## 2019-04-26 ENCOUNTER — OFFICE VISIT (OUTPATIENT)
Dept: PHYSICAL THERAPY | Facility: CLINIC | Age: 64
End: 2019-04-26
Payer: OTHER MISCELLANEOUS

## 2019-04-26 DIAGNOSIS — S52.591D OTHER FRACTURES OF LOWER END OF RIGHT RADIUS, SUBSEQUENT ENCOUNTER FOR CLOSED FRACTURE WITH ROUTINE HEALING: Primary | ICD-10-CM

## 2019-04-26 PROCEDURE — 97140 MANUAL THERAPY 1/> REGIONS: CPT

## 2019-04-26 PROCEDURE — 97010 HOT OR COLD PACKS THERAPY: CPT

## 2019-04-26 PROCEDURE — 97112 NEUROMUSCULAR REEDUCATION: CPT

## 2019-04-26 PROCEDURE — 97110 THERAPEUTIC EXERCISES: CPT

## 2019-04-26 PROCEDURE — 97035 APP MDLTY 1+ULTRASOUND EA 15: CPT

## 2019-04-30 ENCOUNTER — OFFICE VISIT (OUTPATIENT)
Dept: PHYSICAL THERAPY | Facility: CLINIC | Age: 64
End: 2019-04-30
Payer: OTHER MISCELLANEOUS

## 2019-04-30 DIAGNOSIS — S52.591D OTHER FRACTURES OF LOWER END OF RIGHT RADIUS, SUBSEQUENT ENCOUNTER FOR CLOSED FRACTURE WITH ROUTINE HEALING: Primary | ICD-10-CM

## 2019-04-30 PROCEDURE — 97112 NEUROMUSCULAR REEDUCATION: CPT

## 2019-04-30 PROCEDURE — 97110 THERAPEUTIC EXERCISES: CPT

## 2019-04-30 PROCEDURE — 97010 HOT OR COLD PACKS THERAPY: CPT

## 2019-04-30 PROCEDURE — 97140 MANUAL THERAPY 1/> REGIONS: CPT

## 2019-04-30 PROCEDURE — 97035 APP MDLTY 1+ULTRASOUND EA 15: CPT

## 2019-05-02 ENCOUNTER — OFFICE VISIT (OUTPATIENT)
Dept: UROLOGY | Facility: CLINIC | Age: 64
End: 2019-05-02
Payer: COMMERCIAL

## 2019-05-02 VITALS
WEIGHT: 221 LBS | SYSTOLIC BLOOD PRESSURE: 120 MMHG | BODY MASS INDEX: 32.64 KG/M2 | HEART RATE: 70 BPM | DIASTOLIC BLOOD PRESSURE: 68 MMHG

## 2019-05-02 DIAGNOSIS — C61 PROSTATE CANCER (HCC): Primary | ICD-10-CM

## 2019-05-02 PROCEDURE — 99215 OFFICE O/P EST HI 40 MIN: CPT | Performed by: UROLOGY

## 2019-05-03 ENCOUNTER — OFFICE VISIT (OUTPATIENT)
Dept: PHYSICAL THERAPY | Facility: CLINIC | Age: 64
End: 2019-05-03
Payer: OTHER MISCELLANEOUS

## 2019-05-03 DIAGNOSIS — S52.591D OTHER FRACTURES OF LOWER END OF RIGHT RADIUS, SUBSEQUENT ENCOUNTER FOR CLOSED FRACTURE WITH ROUTINE HEALING: Primary | ICD-10-CM

## 2019-05-03 PROCEDURE — 97112 NEUROMUSCULAR REEDUCATION: CPT

## 2019-05-03 PROCEDURE — 97010 HOT OR COLD PACKS THERAPY: CPT

## 2019-05-03 PROCEDURE — 97140 MANUAL THERAPY 1/> REGIONS: CPT

## 2019-05-03 PROCEDURE — 97110 THERAPEUTIC EXERCISES: CPT

## 2019-05-03 PROCEDURE — 97035 APP MDLTY 1+ULTRASOUND EA 15: CPT

## 2019-05-07 ENCOUNTER — OFFICE VISIT (OUTPATIENT)
Dept: PHYSICAL THERAPY | Facility: CLINIC | Age: 64
End: 2019-05-07
Payer: OTHER MISCELLANEOUS

## 2019-05-07 DIAGNOSIS — S52.591D OTHER FRACTURES OF LOWER END OF RIGHT RADIUS, SUBSEQUENT ENCOUNTER FOR CLOSED FRACTURE WITH ROUTINE HEALING: Primary | ICD-10-CM

## 2019-05-07 PROCEDURE — 97140 MANUAL THERAPY 1/> REGIONS: CPT

## 2019-05-07 PROCEDURE — 97035 APP MDLTY 1+ULTRASOUND EA 15: CPT

## 2019-05-07 PROCEDURE — 97110 THERAPEUTIC EXERCISES: CPT

## 2019-05-07 PROCEDURE — 97112 NEUROMUSCULAR REEDUCATION: CPT

## 2019-05-10 ENCOUNTER — OFFICE VISIT (OUTPATIENT)
Dept: PHYSICAL THERAPY | Facility: CLINIC | Age: 64
End: 2019-05-10
Payer: OTHER MISCELLANEOUS

## 2019-05-10 DIAGNOSIS — S52.591D OTHER FRACTURES OF LOWER END OF RIGHT RADIUS, SUBSEQUENT ENCOUNTER FOR CLOSED FRACTURE WITH ROUTINE HEALING: Primary | ICD-10-CM

## 2019-05-10 PROCEDURE — 97112 NEUROMUSCULAR REEDUCATION: CPT

## 2019-05-10 PROCEDURE — 97535 SELF CARE MNGMENT TRAINING: CPT

## 2019-05-10 PROCEDURE — 97110 THERAPEUTIC EXERCISES: CPT

## 2019-05-10 PROCEDURE — 97140 MANUAL THERAPY 1/> REGIONS: CPT

## 2019-05-14 ENCOUNTER — OFFICE VISIT (OUTPATIENT)
Dept: PHYSICAL THERAPY | Facility: CLINIC | Age: 64
End: 2019-05-14
Payer: OTHER MISCELLANEOUS

## 2019-05-14 DIAGNOSIS — S52.591D OTHER FRACTURES OF LOWER END OF RIGHT RADIUS, SUBSEQUENT ENCOUNTER FOR CLOSED FRACTURE WITH ROUTINE HEALING: Primary | ICD-10-CM

## 2019-05-14 PROCEDURE — 97140 MANUAL THERAPY 1/> REGIONS: CPT | Performed by: PHYSICAL THERAPIST

## 2019-05-14 PROCEDURE — 97110 THERAPEUTIC EXERCISES: CPT | Performed by: PHYSICAL THERAPIST

## 2019-05-14 PROCEDURE — 97010 HOT OR COLD PACKS THERAPY: CPT | Performed by: PHYSICAL THERAPIST

## 2019-05-14 PROCEDURE — 97035 APP MDLTY 1+ULTRASOUND EA 15: CPT | Performed by: PHYSICAL THERAPIST

## 2019-05-14 PROCEDURE — 97112 NEUROMUSCULAR REEDUCATION: CPT | Performed by: PHYSICAL THERAPIST

## 2019-05-17 ENCOUNTER — OFFICE VISIT (OUTPATIENT)
Dept: PHYSICAL THERAPY | Facility: CLINIC | Age: 64
End: 2019-05-17
Payer: OTHER MISCELLANEOUS

## 2019-05-17 DIAGNOSIS — S52.591D OTHER FRACTURES OF LOWER END OF RIGHT RADIUS, SUBSEQUENT ENCOUNTER FOR CLOSED FRACTURE WITH ROUTINE HEALING: Primary | ICD-10-CM

## 2019-05-17 DIAGNOSIS — I10 HYPERTENSION, UNSPECIFIED TYPE: Primary | ICD-10-CM

## 2019-05-17 PROCEDURE — 97010 HOT OR COLD PACKS THERAPY: CPT | Performed by: PHYSICAL THERAPIST

## 2019-05-17 PROCEDURE — 97110 THERAPEUTIC EXERCISES: CPT | Performed by: PHYSICAL THERAPIST

## 2019-05-17 PROCEDURE — 97535 SELF CARE MNGMENT TRAINING: CPT | Performed by: PHYSICAL THERAPIST

## 2019-05-17 PROCEDURE — 97035 APP MDLTY 1+ULTRASOUND EA 15: CPT | Performed by: PHYSICAL THERAPIST

## 2019-05-17 PROCEDURE — 97140 MANUAL THERAPY 1/> REGIONS: CPT | Performed by: PHYSICAL THERAPIST

## 2019-05-17 PROCEDURE — 97112 NEUROMUSCULAR REEDUCATION: CPT | Performed by: PHYSICAL THERAPIST

## 2019-05-17 RX ORDER — LOSARTAN POTASSIUM AND HYDROCHLOROTHIAZIDE 12.5; 5 MG/1; MG/1
1 TABLET ORAL DAILY
Qty: 90 TABLET | Refills: 2 | Status: SHIPPED | OUTPATIENT
Start: 2019-05-17 | End: 2020-01-22

## 2019-06-07 ENCOUNTER — HOSPITAL ENCOUNTER (OUTPATIENT)
Dept: RADIOLOGY | Facility: HOSPITAL | Age: 64
Discharge: HOME | End: 2019-06-07
Attending: UROLOGY
Payer: COMMERCIAL

## 2019-06-07 DIAGNOSIS — C61 PROSTATE CANCER (HCC): ICD-10-CM

## 2019-06-07 PROCEDURE — 76377 3D RENDER W/INTRP POSTPROCES: CPT

## 2019-06-07 PROCEDURE — 72197 MRI PELVIS W/O & W/DYE: CPT

## 2019-06-07 PROCEDURE — A9585 GADOBUTROL INJECTION: HCPCS | Performed by: UROLOGY

## 2019-06-07 RX ADMIN — GADOBUTROL 10 ML: 604.72 INJECTION INTRAVENOUS at 13:21

## 2019-06-11 ENCOUNTER — OFFICE VISIT (OUTPATIENT)
Dept: UROLOGY | Facility: CLINIC | Age: 64
End: 2019-06-11
Payer: COMMERCIAL

## 2019-06-11 VITALS
DIASTOLIC BLOOD PRESSURE: 80 MMHG | HEIGHT: 68 IN | HEART RATE: 62 BPM | SYSTOLIC BLOOD PRESSURE: 122 MMHG | BODY MASS INDEX: 33.49 KG/M2 | WEIGHT: 221 LBS

## 2019-06-11 DIAGNOSIS — C61 PROSTATE CANCER (HCC): Primary | ICD-10-CM

## 2019-06-11 PROCEDURE — 99213 OFFICE O/P EST LOW 20 MIN: CPT | Performed by: UROLOGY

## 2019-11-30 LAB — PSA SERPL-MCNC: 4.9 NG/ML

## 2019-12-12 ENCOUNTER — TELEPHONE (OUTPATIENT)
Dept: UROLOGY | Facility: MEDICAL CENTER | Age: 64
End: 2019-12-12

## 2019-12-12 ENCOUNTER — PREP FOR PROCEDURE (OUTPATIENT)
Dept: UROLOGY | Facility: CLINIC | Age: 64
End: 2019-12-12

## 2019-12-12 DIAGNOSIS — C61 PROSTATE CANCER (HCC): Primary | ICD-10-CM

## 2019-12-12 NOTE — TELEPHONE ENCOUNTER
This is a patient of Dr Elsi Ruby in [place]  Patient insurance rolls over 2/1/20  If he needs a prostate biopsy he would like to have it in January before the end of the month  Patient appointment was rescheduled until 1/10/20  Please call patient back at [phone]

## 2019-12-12 NOTE — TELEPHONE ENCOUNTER
Called and spoke to patient and explained the transperineal biopsy and that he should keep the appointment for 1/10 for an H&P     Patient confirmed this would work for him and had no additional questions

## 2019-12-12 NOTE — TELEPHONE ENCOUNTER
Let the patient know he will need a 1 year confirmatory biopsy around February  I will attempt at the patient on for a transperineal biopsy in the operating room in January  He should keep his appointment on the 10th to do a history and physical update

## 2019-12-13 ENCOUNTER — OFFICE VISIT (OUTPATIENT)
Dept: INTERNAL MEDICINE CLINIC | Facility: CLINIC | Age: 64
End: 2019-12-13
Payer: COMMERCIAL

## 2019-12-13 VITALS
HEART RATE: 75 BPM | DIASTOLIC BLOOD PRESSURE: 70 MMHG | SYSTOLIC BLOOD PRESSURE: 128 MMHG | HEIGHT: 68 IN | OXYGEN SATURATION: 99 % | TEMPERATURE: 96.7 F | BODY MASS INDEX: 33.36 KG/M2 | WEIGHT: 220.13 LBS

## 2019-12-13 DIAGNOSIS — I10 HYPERTENSION, UNSPECIFIED TYPE: ICD-10-CM

## 2019-12-13 DIAGNOSIS — C61 PROSTATE CANCER (HCC): ICD-10-CM

## 2019-12-13 DIAGNOSIS — Z00.00 ENCOUNTER FOR PREVENTIVE HEALTH EXAMINATION: Primary | ICD-10-CM

## 2019-12-13 DIAGNOSIS — Z23 FLU VACCINE NEED: ICD-10-CM

## 2019-12-13 PROCEDURE — 90471 IMMUNIZATION ADMIN: CPT

## 2019-12-13 PROCEDURE — 90682 RIV4 VACC RECOMBINANT DNA IM: CPT

## 2019-12-13 PROCEDURE — 99396 PREV VISIT EST AGE 40-64: CPT | Performed by: INTERNAL MEDICINE

## 2019-12-13 NOTE — PROGRESS NOTES
Assessment/Plan:         Diagnoses and all orders for this visit:    Encounter for preventive health examination  Pt willresume exercise as able once recovered from CTS surgery  Encouraged consider PT for shoulder sxs/strengthening  Colon utd  He has followup with Urology and recent notes reviewed along with PSA  Adequate hydration discussed    Flu vaccine need  -     influenza vaccine, [id number], quadrivalent, recombinant, PF, 0 5 mL, for patients 18 yr+ (FLUBLOK)  Flu shot today    Prostate cancer ([facility] [address] )  Pt followed by [address] [place] [address] Urology and being monitored Last PSA was lower than prior and pt without sxs    Hypertension, unspecified type  Bp stable Continue present regimen Pt stable on medication      Discussed Shingrix and he will consider  He will get labs at annual lab draw in spring PSA monitored per urology  RTO 1 year       Patient ID: Landon Camacho is a 62 y o  male  HPI  Pt feeling better presently He was diagnosed with prostate cancer earlier this year He denies any sxs and is monitoring PSA/sxs at present He is followed by [facility] urology  He has planned biopsy in January He is planning to return end of December from his [employer] company  He is ready and has plans for other projects He recently had CTS and cubital tunnel repairs B/l by Dr Karla Reza and very pleased with results He has ongoing right shoulder pain and restricted rom which increased after his surgeries  No chest pain or sob He states his BP has been stable on current regimen He would like flu shot today      Review of Systems   Constitutional: Negative  HENT: Negative  Eyes: Negative  Respiratory: Negative  Cardiovascular: Negative  Gastrointestinal: Negative  Genitourinary: Negative for difficulty urinating, dysuria and frequency  Musculoskeletal: Positive for arthralgias  Right shoulder pain   Skin: Negative  Neurological: Negative  Hematological: Negative      Psychiatric/Behavioral: Negative         Past Medical History:   Diagnosis Date    Hypertension      Past Surgical History:   Procedure Laterality Date    LEG SURGERY Right     tumor removal     PROSTATE BIOPSY  02/27/2019     Social History     Socioeconomic History    Marital status: /Civil Union     Spouse name: Not on file    Number of children: Not on file    Years of education: Not on file    Highest education level: Not on file   Occupational History    Not on file   Social Needs    Financial resource strain: Not on file    Food insecurity:     Worry: Not on file     Inability: Not on file    Transportation needs:     Medical: Not on file     Non-medical: Not on file   Tobacco Use    Smoking status: Never Smoker    Smokeless tobacco: Never Used   Substance and Sexual Activity    Alcohol use: Yes     Comment: social    Drug use: Never    Sexual activity: Not on file   Lifestyle    Physical activity:     Days per week: Not on file     Minutes per session: Not on file    Stress: Not on file   Relationships    Social connections:     Talks on phone: Not on file     Gets together: Not on file     Attends Jain service: Not on file     Active member of club or organization: Not on file     Attends meetings of clubs or organizations: Not on file     Relationship status: Not on file    Intimate partner violence:     Fear of current or ex partner: Not on file     Emotionally abused: Not on file     Physically abused: Not on file     Forced sexual activity: Not on file   Other Topics Concern    Not on file   Social History Narrative    Caffeine use    Dental care, regularly    Exercises 3 to 4 times per week    Lives independently with spouse    Sun protection sunscreen    Uses safety equipment-seatbelts         No Known Allergies        /70   Pulse 75   Temp (!) 96 7 °F (35 9 °C) (Tympanic)   Ht 5' 8" (1 727 m)   Wt 99 8 kg (220 lb 2 oz)   SpO2 99%   BMI 33 47 kg/m²          Physical Exam

## 2020-01-10 ENCOUNTER — OFFICE VISIT (OUTPATIENT)
Dept: UROLOGY | Facility: CLINIC | Age: 65
End: 2020-01-10
Payer: COMMERCIAL

## 2020-01-10 VITALS
HEIGHT: 68 IN | SYSTOLIC BLOOD PRESSURE: 110 MMHG | HEART RATE: 65 BPM | BODY MASS INDEX: 33.19 KG/M2 | WEIGHT: 219 LBS | DIASTOLIC BLOOD PRESSURE: 62 MMHG

## 2020-01-10 DIAGNOSIS — C61 PROSTATE CANCER (HCC): Primary | ICD-10-CM

## 2020-01-10 PROCEDURE — 99213 OFFICE O/P EST LOW 20 MIN: CPT | Performed by: UROLOGY

## 2020-01-10 NOTE — PROGRESS NOTES
UROLOGY FOLLOWUP NOTE     CHIEF COMPLAINT   Evita Reyes is a 62 y o  male with a complaint of   Chief Complaint   Patient presents with    Prostate Cancer       History of Present Illness:     62 y o  male with recent PSA check of 5 1  Patient has been undergoing health fair PSAs for the last handful of years  He has not had a digital rectal exam for the last 3 years  He is  and has no family history of prostate cancer  His father in law does have prostate cancer and has been treated with radiation and androgen deprivation therapy  He presents for discussion  He has some mild low level urinary urgency and hesitancy  He is not interested in treatment for this  He is very healthy and exercises and lift weights regularly  AUA SS 13 QoL 2    We continued to follow the patient's PSA after the initial visit and noted continued to rise  I discussed with the patient by phone my recommendation to proceed with a prostate biopsy and he agreed  He presents today for this procedure  Aspirin held  Patient works as a  and yesterday fell office 17 ft roof  He did break his radius and the med a carpal bone  He has some other scratches and bruises but is otherwise fine  Patient agreed to proceed with active surveillance but given some anxiety, wanted to perform multiparametric MRI sooner  PSA 6/2/19 5 7  Lab Results   Component Value Date    PSA 4 9 (H) 11/29/2019    PSA 6 2 (H) 12/07/2018    PSA 4 8 (H) 11/30/2018   PSA 3/17/2018 - 5 1  PSA 3/19/2017 - 3 9  PSA 3/20/2016 - 2 5  PSA 2015 - 3 7  PSA 2014 - 2 7  PSA 2013 - 2 5    Patient continues on surveillance  Patient's PSA has stable  He was initially scheduled for a perineal biopsy but due to some family related issues canceled  He presents today to discuss further      Past Medical History:     Past Medical History:   Diagnosis Date    Hypertension        PAST SURGICAL HISTORY:     Past Surgical History: Procedure Laterality Date    LEG SURGERY Right     tumor removal     PROSTATE BIOPSY  02/27/2019       CURRENT MEDICATIONS:     Current Outpatient Medications   Medication Sig Dispense Refill    aspirin 81 MG tablet Take by mouth      MULTIPLE VITAMINS ESSENTIAL PO Take 1 tablet by mouth daily      NON FORMULARY 2 (two) times a day Super Beta Prostate supplement      losartan-hydrochlorothiazide (HYZAAR) 50-12 5 mg per tablet Take 1 tablet by mouth daily for 90 days 90 tablet 2     No current facility-administered medications for this visit          ALLERGIES:   No Known Allergies    SOCIAL HISTORY:     Social History     Socioeconomic History    Marital status: /Civil Union     Spouse name: None    Number of children: None    Years of education: None    Highest education level: None   Occupational History    None   Social Needs    Financial resource strain: None    Food insecurity:     Worry: None     Inability: None    Transportation needs:     Medical: None     Non-medical: None   Tobacco Use    Smoking status: Never Smoker    Smokeless tobacco: Never Used   Substance and Sexual Activity    Alcohol use: Yes     Comment: social    Drug use: Never    Sexual activity: None   Lifestyle    Physical activity:     Days per week: None     Minutes per session: None    Stress: None   Relationships    Social connections:     Talks on phone: None     Gets together: None     Attends Mormonism service: None     Active member of club or organization: None     Attends meetings of clubs or organizations: None     Relationship status: None    Intimate partner violence:     Fear of current or ex partner: None     Emotionally abused: None     Physically abused: None     Forced sexual activity: None   Other Topics Concern    None   Social History Narrative    Caffeine use    Dental care, regularly    Exercises 3 to 4 times per week    Lives independently with spouse    Sun protection sunscreen    Uses safety equipment-seatbelts           SOCIAL HISTORY:     Family History   Problem Relation Age of Onset    Glaucoma Mother     Hyperlipidemia Mother     Heart attack Father     Hyperlipidemia Father     Glaucoma Maternal Grandmother        REVIEW OF SYSTEMS:     Review of Systems   Constitutional: Negative  Respiratory: Negative  Cardiovascular: Negative  Gastrointestinal: Negative  Genitourinary: Negative for urgency  Musculoskeletal: Negative  Neurological: Negative  Psychiatric/Behavioral: The patient is nervous/anxious  PHYSICAL EXAM:     /62   Pulse 65   Ht 5' 8" (1 727 m)   Wt 99 3 kg (219 lb)   BMI 33 30 kg/m²     General:  Healthy appearing male in no acute distress  They have a normal affect  There is not appear to be any gross neurologic defects or abnormalities  Multiple scratches of the face  HEENT:  Normocephalic, atraumatic  Neck is supple without any palpable lymphadenopathy  Cardiovascular:  Patient has normal palpable distal radial pulses  There is no significant peripheral edema  No JVD is noted  Respiratory:  Patient has unlabored respirations  There is no audible wheeze or rhonchi  Abdomen:  Abdomen is without surgical scars  Right anterior thigh scar  Abdomen is soft and nontender  There is no tympany  Inguinal and umbilical hernia are not appreciated  Musculoskeletal:  Patient does not have significant CVA tenderness in the  flank with palpation or percussion  They full range of motion in all 4 extremities  Strength in all 4 extremities appears congruent  Patient is able to ambulate without assistance or difficulty  Right wrist and lower arm in a Ace wrap  Dermatologic:  Patient has no skin abnormalities or rashes        LABS:     CBC:   Lab Results   Component Value Date    WBC 7 17 02/26/2019    HGB 14 6 02/26/2019    HCT 43 4 02/26/2019    MCV 90 02/26/2019     02/26/2019       BMP:   Lab Results   Component Value Date    CALCIUM 9 0 2019    K 4 2 2019    CO2 30 2019     2019    BUN 18 2019    CREATININE 1 37 (H) 2019     PSA 19 5 7  Lab Results   Component Value Date    PSA 4 9 (H) 2019    PSA 6 2 (H) 2018    PSA 4 8 (H) 2018   PSA 3/17/2018 - 5 1  PSA 3/19/2017 - 3 9  PSA 3/20/2016 - 2 5  PSA  - 3 7  PSA  - 2 7  PSA  -  5    IMAGIN/7/19  MULTIPARAMETRIC MRI OF THE PROSTATE WITH AND WITHOUT CONTRAST      INDICATION:   C61: Malignant neoplasm of prostate  On active surveillance      COMPARISON: None     PSA LEVEL: 6 2 ng/ml  2018  PRIOR BIOPSY DATE: 2019  BIOPSY RESULTS: [place] 6 prostate cancer involving the left lateral apex and right lateral mid gland      TECHNIQUE: The following pulse sequences were obtained:  T1w axial; T2w axial, sagittal and coronal; DWI axial and ADC map; T1w water, fat, in-phase and opposed-phase axials of entire pelvis and dynamic 3D T1w axial before and during IV contrast   injection         CONTRAST:  Gadobutrol (Gadavist) 10 mL of gadobutrol injection (MULTI-DOSE)     TECHNICAL LIMITATIONS: None      FINDINGS:     PROSTATE GLAND:  -VOLUME: 60 4 ml   -PERIPHERAL ZONE:  There is mild peripheral zone heterogeneity without focal suspicious lesion  -TRANSITION ZONE:  Multiple BPH nodules are identified  Esteban Castro -CENTRAL ZONE: Normal   -ANTERIOR FIBROMUSCULAR STROMA:  Within normal limits  Esteban Castro -"CAPSULE": Intact      SEMINAL VESICLES: Within normal limits        PELVIC CAVITY:  -LYMPH NODES: There is a borderline left external iliac lymph node measuring 9 mm though this appears to be mostly fatty replaced and likely benign  -BLADDER: Unremarkable  -ADDITIONAL FINDINGS: There is diverticulosis coli      OSSEOUS STRUCTURES:   Normal marrow signal  No evidence of bone metastases        IMPRESSION:     1  No suspicious lesion  Nodules enlarge prostate with BPH nodules and mild peripheral zone heterogeneity      2    Borderline left external iliac lymph node though mostly fatty replaced and likely benign      3  Diverticulosis coli      PI-RADS v2 Assessment Category: PI-RADS 2: Low (clinically significant cancer is unlikely to be present)  PATHOLOGY:     2/27/19 - Read by Dr Alonzo Queen in review  Final Diagnosis   A  Prostate, left lateral base, biopsy:     - No significant pathologic abnormalities  - No high grade prostatic intraepithelial neoplasia (HGPIN) or carcinoma identified      B  Prostate, left lateral mid, biopsy:     - No significant pathologic abnormalities  - No high grade prostatic intraepithelial neoplasia (HGPIN) or carcinoma identified      C  Prostate, left lateral apex, biopsy:     - Small focus of prostatic adenocarcinoma, francisco j score 3+3=6 (grade Group 1), involving less than 5% of        one (1) core  See note      D  Prostate, left medial base, biopsy:     - No significant pathologic abnormalities  - No high grade prostatic intraepithelial neoplasia (HGPIN) or carcinoma identified      E  Prostate, left medial mid, biopsy:     - No significant pathologic abnormalities  - No high grade prostatic intraepithelial neoplasia (HGPIN) or carcinoma identified      F  Prostate, left medial apex, biopsy:     - No significant pathologic abnormalities  - No high grade prostatic intraepithelial neoplasia (HGPIN) or carcinoma identified      G  Prostate, right lateral base, biopsy:     - No significant pathologic abnormalities  - No high grade prostatic intraepithelial neoplasia (HGPIN) or carcinoma identified      H  Prostate, right lateral mid, biopsy:     - Prostatic adenocarcinoma, Francisco J score 3+3+6 (Grade Group 1)Discontinuously involving 10% of        one (1) core  See note      I  Prostate, right lateral apex, biopsy:     - No significant pathologic abnormalities       - No high grade prostatic intraepithelial neoplasia (HGPIN) or carcinoma identified      J   Prostate, right medial base, biopsy:     - No significant pathologic abnormalities  - No high grade prostatic intraepithelial neoplasia (HGPIN) or carcinoma identified      K  Prostate, right medial mid, biopsy:     - No significant pathologic abnormalities  - No high grade prostatic intraepithelial neoplasia (HGPIN) or carcinoma identified      L  Prostate, right medial apex, biopsy     - No significant pathologic abnormalities  - No high grade prostatic intraepithelial neoplasia (HGPIN) or carcinoma identified  ASSESSMENT:     62 y o  male with low volume very low risk cT1c adenocarcinoma of the prostate, now s/p mpMRI    PLAN:     Patient is approaching the 1 year anniversary of his prostate cancer diagnosis  He will be approaching the need for a confirmatory biopsy  We performed a multiparametric MRI which was negative for occult lesion  I have recommended going forward with a perineal biopsy for more aggressive anterior and apical sampling  I discussed the difference in the benefits of a perineal biopsy as opposed to a transrectal approach  The patient is inclined towards this modality  He would consider doing the procedure with Dr Jefferson Garvin at the [facility]  After discussion of risks and benefits, he tentatively signed consent today  He will call me to let me know if he is interested in moving forward so the surgery can be rescheduled

## 2020-01-22 RX ORDER — LOSARTAN POTASSIUM AND HYDROCHLOROTHIAZIDE 12.5; 5 MG/1; MG/1
1 TABLET ORAL
COMMUNITY
End: 2020-01-29 | Stop reason: SDUPTHER

## 2020-01-22 NOTE — PRE-PROCEDURE INSTRUCTIONS
Pre-Surgery Instructions:   Medication Instructions    Ascorbic Acid (VITAMIN C PO) Instructed patient per Anesthesia Guidelines   aspirin 81 MG tablet Patient was instructed by Physician and understands   losartan-hydrochlorothiazide (HYZAAR) 50-12 5 mg per tablet Instructed patient per Anesthesia Guidelines   MULTIPLE VITAMINS ESSENTIAL PO Instructed patient per Anesthesia Guidelines   NON FORMULARY Instructed patient per Anesthesia Guidelines      Preop,medications and showering instructions using an antibacterial soap reviewed - Instructed to follow Dr Wilman Sauceda

## 2020-01-28 ENCOUNTER — HOSPITAL ENCOUNTER (OUTPATIENT)
Dept: NON INVASIVE DIAGNOSTICS | Facility: HOSPITAL | Age: 65
Discharge: HOME | End: 2020-01-28
Attending: UROLOGY
Payer: COMMERCIAL

## 2020-01-28 DIAGNOSIS — C61 PROSTATE CANCER (HCC): ICD-10-CM

## 2020-01-28 LAB
ATRIAL RATE: 55 BPM
P AXIS: 19 DEGREES
PR INTERVAL: 188 MS
QRS AXIS: 43 DEGREES
QRSD INTERVAL: 84 MS
QT INTERVAL: 424 MS
QTC INTERVAL: 405 MS
T WAVE AXIS: 55 DEGREES
VENTRICULAR RATE: 55 BPM

## 2020-01-28 PROCEDURE — 93010 ELECTROCARDIOGRAM REPORT: CPT | Performed by: INTERNAL MEDICINE

## 2020-01-28 PROCEDURE — 93005 ELECTROCARDIOGRAM TRACING: CPT

## 2020-01-29 DIAGNOSIS — I10 HYPERTENSION, UNSPECIFIED TYPE: Primary | ICD-10-CM

## 2020-01-29 RX ORDER — LOSARTAN POTASSIUM AND HYDROCHLOROTHIAZIDE 12.5; 5 MG/1; MG/1
1 TABLET ORAL
Qty: 90 TABLET | Refills: 3 | Status: SHIPPED | OUTPATIENT
Start: 2020-01-29 | End: 2020-12-18 | Stop reason: SDUPTHER

## 2020-02-10 ENCOUNTER — ANESTHESIA EVENT (OUTPATIENT)
Dept: PERIOP | Facility: AMBULARY SURGERY CENTER | Age: 65
End: 2020-02-10
Payer: COMMERCIAL

## 2020-02-21 ENCOUNTER — ANESTHESIA (OUTPATIENT)
Dept: PERIOP | Facility: AMBULARY SURGERY CENTER | Age: 65
End: 2020-02-21
Payer: COMMERCIAL

## 2020-02-21 ENCOUNTER — HOSPITAL ENCOUNTER (OUTPATIENT)
Facility: AMBULARY SURGERY CENTER | Age: 65
Setting detail: OUTPATIENT SURGERY
Discharge: HOME | End: 2020-02-21
Attending: UROLOGY | Admitting: UROLOGY
Payer: COMMERCIAL

## 2020-02-21 VITALS
OXYGEN SATURATION: 98 % | WEIGHT: 216 LBS | HEIGHT: 68 IN | HEART RATE: 52 BPM | RESPIRATION RATE: 18 BRPM | BODY MASS INDEX: 32.74 KG/M2 | SYSTOLIC BLOOD PRESSURE: 143 MMHG | TEMPERATURE: 98.5 F | DIASTOLIC BLOOD PRESSURE: 69 MMHG

## 2020-02-21 DIAGNOSIS — C61 PROSTATE CANCER (HCC): ICD-10-CM

## 2020-02-21 PROCEDURE — G0416 PROSTATE BIOPSY, ANY MTHD: HCPCS | Performed by: PATHOLOGY

## 2020-02-21 PROCEDURE — 88344 IMHCHEM/IMCYTCHM EA MLT ANTB: CPT | Performed by: PATHOLOGY

## 2020-02-21 PROCEDURE — 55700 PR BIOPSY OF PROSTATE,NEEDLE/PUNCH: CPT | Performed by: UROLOGY

## 2020-02-21 PROCEDURE — NC001 PR NO CHARGE: Performed by: UROLOGY

## 2020-02-21 RX ORDER — HYDROMORPHONE HCL/PF 1 MG/ML
0.2 SYRINGE (ML) INJECTION
Status: DISCONTINUED | OUTPATIENT
Start: 2020-02-21 | End: 2020-02-21 | Stop reason: HOSPADM

## 2020-02-21 RX ORDER — ONDANSETRON 2 MG/ML
4 INJECTION INTRAMUSCULAR; INTRAVENOUS ONCE AS NEEDED
Status: DISCONTINUED | OUTPATIENT
Start: 2020-02-21 | End: 2020-02-21 | Stop reason: HOSPADM

## 2020-02-21 RX ORDER — FENTANYL CITRATE/PF 50 MCG/ML
25 SYRINGE (ML) INJECTION
Status: DISCONTINUED | OUTPATIENT
Start: 2020-02-21 | End: 2020-02-21 | Stop reason: HOSPADM

## 2020-02-21 RX ORDER — SODIUM CHLORIDE, SODIUM LACTATE, POTASSIUM CHLORIDE, CALCIUM CHLORIDE 600; 310; 30; 20 MG/100ML; MG/100ML; MG/100ML; MG/100ML
INJECTION, SOLUTION INTRAVENOUS CONTINUOUS PRN
Status: DISCONTINUED | OUTPATIENT
Start: 2020-02-21 | End: 2020-02-21 | Stop reason: SURG

## 2020-02-21 RX ORDER — DEXAMETHASONE SODIUM PHOSPHATE 10 MG/ML
INJECTION, SOLUTION INTRAMUSCULAR; INTRAVENOUS AS NEEDED
Status: DISCONTINUED | OUTPATIENT
Start: 2020-02-21 | End: 2020-02-21 | Stop reason: SURG

## 2020-02-21 RX ORDER — TAMSULOSIN HYDROCHLORIDE 0.4 MG/1
0.4 CAPSULE ORAL ONCE
Status: COMPLETED | OUTPATIENT
Start: 2020-02-21 | End: 2020-02-21

## 2020-02-21 RX ORDER — LIDOCAINE HYDROCHLORIDE 10 MG/ML
INJECTION, SOLUTION EPIDURAL; INFILTRATION; INTRACAUDAL; PERINEURAL AS NEEDED
Status: DISCONTINUED | OUTPATIENT
Start: 2020-02-21 | End: 2020-02-21 | Stop reason: SURG

## 2020-02-21 RX ORDER — FENTANYL CITRATE 50 UG/ML
INJECTION, SOLUTION INTRAMUSCULAR; INTRAVENOUS AS NEEDED
Status: DISCONTINUED | OUTPATIENT
Start: 2020-02-21 | End: 2020-02-21 | Stop reason: SURG

## 2020-02-21 RX ORDER — PROPOFOL 10 MG/ML
INJECTION, EMULSION INTRAVENOUS AS NEEDED
Status: DISCONTINUED | OUTPATIENT
Start: 2020-02-21 | End: 2020-02-21 | Stop reason: SURG

## 2020-02-21 RX ORDER — MIDAZOLAM HYDROCHLORIDE 2 MG/2ML
INJECTION, SOLUTION INTRAMUSCULAR; INTRAVENOUS AS NEEDED
Status: DISCONTINUED | OUTPATIENT
Start: 2020-02-21 | End: 2020-02-21 | Stop reason: SURG

## 2020-02-21 RX ORDER — TRAMADOL HYDROCHLORIDE 50 MG/1
50 TABLET ORAL EVERY 6 HOURS PRN
Qty: 5 TABLET | Refills: 0 | Status: SHIPPED | OUTPATIENT
Start: 2020-02-21 | End: 2020-12-18 | Stop reason: ALTCHOICE

## 2020-02-21 RX ORDER — LIDOCAINE HYDROCHLORIDE AND EPINEPHRINE 10; 10 MG/ML; UG/ML
INJECTION, SOLUTION INFILTRATION; PERINEURAL AS NEEDED
Status: DISCONTINUED | OUTPATIENT
Start: 2020-02-21 | End: 2020-02-21 | Stop reason: HOSPADM

## 2020-02-21 RX ORDER — ONDANSETRON 2 MG/ML
INJECTION INTRAMUSCULAR; INTRAVENOUS AS NEEDED
Status: DISCONTINUED | OUTPATIENT
Start: 2020-02-21 | End: 2020-02-21 | Stop reason: SURG

## 2020-02-21 RX ORDER — CEFTRIAXONE 1 G/1
1000 INJECTION, POWDER, FOR SOLUTION INTRAMUSCULAR; INTRAVENOUS ONCE
Status: COMPLETED | OUTPATIENT
Start: 2020-02-21 | End: 2020-02-21

## 2020-02-21 RX ORDER — PHENAZOPYRIDINE HYDROCHLORIDE 100 MG/1
100 TABLET, FILM COATED ORAL ONCE
Status: COMPLETED | OUTPATIENT
Start: 2020-02-21 | End: 2020-02-21

## 2020-02-21 RX ADMIN — LIDOCAINE HYDROCHLORIDE 70 MG: 10 INJECTION, SOLUTION EPIDURAL; INFILTRATION; INTRACAUDAL; PERINEURAL at 12:30

## 2020-02-21 RX ADMIN — PROPOFOL 200 MG: 10 INJECTION, EMULSION INTRAVENOUS at 12:30

## 2020-02-21 RX ADMIN — TAMSULOSIN HYDROCHLORIDE 0.4 MG: 0.4 CAPSULE ORAL at 13:52

## 2020-02-21 RX ADMIN — FENTANYL CITRATE 25 MCG: 50 INJECTION, SOLUTION INTRAMUSCULAR; INTRAVENOUS at 12:40

## 2020-02-21 RX ADMIN — ONDANSETRON 4 MG: 2 INJECTION INTRAMUSCULAR; INTRAVENOUS at 12:37

## 2020-02-21 RX ADMIN — MIDAZOLAM HYDROCHLORIDE 2 MG: 1 INJECTION, SOLUTION INTRAMUSCULAR; INTRAVENOUS at 12:23

## 2020-02-21 RX ADMIN — DEXAMETHASONE SODIUM PHOSPHATE 4 MG: 10 INJECTION, SOLUTION INTRAMUSCULAR; INTRAVENOUS at 12:37

## 2020-02-21 RX ADMIN — CEFTRIAXONE SODIUM 1000 MG: 1 INJECTION, POWDER, FOR SOLUTION INTRAMUSCULAR; INTRAVENOUS at 12:19

## 2020-02-21 RX ADMIN — PHENAZOPYRIDINE 100 MG: 100 TABLET ORAL at 13:52

## 2020-02-21 RX ADMIN — SODIUM CHLORIDE, SODIUM LACTATE, POTASSIUM CHLORIDE, AND CALCIUM CHLORIDE: .6; .31; .03; .02 INJECTION, SOLUTION INTRAVENOUS at 11:40

## 2020-02-21 NOTE — PROGRESS NOTES
UROLOGY PREOPERATIVE H&P NOTE     CHIEF COMPLAINT   Mar Shi is a 62 y o  male with a complaint of prostate cancer    History of Present Illness:     62 y o  male with recent PSA check of 5 1  Patient has been undergoing health fair PSAs for the last handful of years  He has not had a digital rectal exam for the last 3 years  He is  and has no family history of prostate cancer  His father in law does have prostate cancer and has been treated with radiation and androgen deprivation therapy  He presents for discussion  He has some mild low level urinary urgency and hesitancy  He is not interested in treatment for this  He is very healthy and exercises and lift weights regularly  AUA SS 13 QoL 2    We continued to follow the patient's PSA after the initial visit and noted continued to rise  I discussed with the patient by phone my recommendation to proceed with a prostate biopsy and he agreed  He presents today for this procedure  Aspirin held  Patient works as a  and yesterday fell office 17 ft roof  He did break his radius and the med a carpal bone  He has some other scratches and bruises but is otherwise fine  Patient agreed to proceed with active surveillance but given some anxiety, wanted to perform multiparametric MRI sooner  PSA 6/2/19 5 7  Lab Results   Component Value Date    PSA 4 9 (H) 11/29/2019    PSA 6 2 (H) 12/07/2018    PSA 4 8 (H) 11/30/2018   PSA 3/17/2018 - 5 1  PSA 3/19/2017 - 3 9  PSA 3/20/2016 - 2 5  PSA 2015 - 3 7  PSA 2014 - 2 7  PSA 2013 - 2 5    Presents for perineal prostate biopsy as confirmatory testing      Past Medical History:     Past Medical History:   Diagnosis Date    Colon polyp     Hypertension        PAST SURGICAL HISTORY:     Past Surgical History:   Procedure Laterality Date    CARPAL TUNNEL RELEASE Bilateral     COLONOSCOPY      FRACTURE SURGERY Right     LEG SURGERY Right     tumor removal     PROSTATE BIOPSY  02/27/2019       CURRENT MEDICATIONS:     Current Facility-Administered Medications   Medication Dose Route Frequency Provider Last Rate Last Dose    cefTRIAXone (ROCEPHIN) injection 1,000 mg  1,000 mg Intravenous Once Marquis Lusi MD           ALLERGIES:   No Known Allergies    SOCIAL HISTORY:     Social History     Socioeconomic History    Marital status: /Civil Union     Spouse name: None    Number of children: None    Years of education: None    Highest education level: None   Occupational History    None   Social Needs    Financial resource strain: None    Food insecurity:     Worry: None     Inability: None    Transportation needs:     Medical: None     Non-medical: None   Tobacco Use    Smoking status: Never Smoker    Smokeless tobacco: Never Used   Substance and Sexual Activity    Alcohol use:  Yes     Alcohol/week: 3 0 standard drinks     Types: 3 Shots of liquor per week     Frequency: 2-3 times a week     Drinks per session: 1 or 2     Binge frequency: Never     Comment: socially    Drug use: Never    Sexual activity: None   Lifestyle    Physical activity:     Days per week: None     Minutes per session: None    Stress: None   Relationships    Social connections:     Talks on phone: None     Gets together: None     Attends [employer] service: None     Active member of club or organization: None     Attends meetings of clubs or organizations: None     Relationship status: None    Intimate partner violence:     Fear of current or ex partner: None     Emotionally abused: None     Physically abused: None     Forced sexual activity: None   Other Topics Concern    None   Social History Narrative    Caffeine use    Dental care, regularly    Exercises 3 to 4 times per week    Lives independently with spouse    Sun protection sunscreen    Uses safety equipment-seatbelts           SOCIAL HISTORY:     Family History   Problem Relation Age of Onset    Glaucoma Mother    Liliane  Hyperlipidemia Mother     Heart attack Father     Hyperlipidemia Father     Glaucoma Maternal Grandmother        REVIEW OF SYSTEMS:     Review of Systems   Constitutional: Negative  Respiratory: Negative  Cardiovascular: Negative  Gastrointestinal: Negative  Genitourinary: Negative for urgency  Musculoskeletal: Negative  Neurological: Negative  Psychiatric/Behavioral: The patient is nervous/anxious  PHYSICAL EXAM:     /86   Pulse (!) 47   Temp 98 5 °F (36 9 °C) (Temporal)   Resp 18   Ht 5' 8" (1 727 m)   Wt 98 kg (216 lb)   SpO2 98%   BMI 32 84 kg/m²     General:  Healthy appearing male in no acute distress  They have a normal affect  There is not appear to be any gross neurologic defects or abnormalities  Multiple scratches of the face  HEENT:  Normocephalic, atraumatic  Neck is supple without any palpable lymphadenopathy  Cardiovascular:  Patient has normal palpable distal radial pulses  There is no significant peripheral edema  No JVD is noted  Respiratory:  Patient has unlabored respirations  There is no audible wheeze or rhonchi  Abdomen:  Abdomen is without surgical scars  Right anterior thigh scar  Abdomen is soft and nontender  There is no tympany  Inguinal and umbilical hernia are not appreciated  Musculoskeletal:  Patient does not have significant CVA tenderness in the  flank with palpation or percussion  They full range of motion in all 4 extremities  Strength in all 4 extremities appears congruent  Patient is able to ambulate without assistance or difficulty  Right wrist and lower arm in a Ace wrap  Dermatologic:  Patient has no skin abnormalities or rashes        LABS:     CBC:   Lab Results   Component Value Date    WBC 7 17 02/26/2019    HGB 14 6 02/26/2019    HCT 43 4 02/26/2019    MCV 90 02/26/2019     02/26/2019       BMP:   Lab Results   Component Value Date    CALCIUM 9 0 02/26/2019    K 4 2 02/26/2019    CO2 30 02/26/2019     2019    BUN 18 2019    CREATININE 1 37 (H) 2019     PSA 19 5 7  Lab Results   Component Value Date    PSA 4 9 (H) 2019    PSA 6 2 (H) 2018    PSA 4 8 (H) 2018   PSA 3/17/2018 - 5 1  PSA 3/19/2017 - 3 9  PSA 3/20/2016 - 2 5  PSA  - 3 7  PSA  - 2 7  PSA  - 2 5    IMAGIN/7/19  MULTIPARAMETRIC MRI OF THE PROSTATE WITH AND WITHOUT CONTRAST      INDICATION:   C61: Malignant neoplasm of prostate  On active surveillance      COMPARISON: None     PSA LEVEL: 6 2 ng/ml  2018  PRIOR BIOPSY DATE: 2019  BIOPSY RESULTS: Byron 6 prostate cancer involving the left lateral apex and right lateral mid gland      TECHNIQUE: The following pulse sequences were obtained:  T1w axial; T2w axial, sagittal and coronal; DWI axial and ADC map; T1w water, fat, in-phase and opposed-phase axials of entire pelvis and dynamic 3D T1w axial before and during IV contrast   injection         CONTRAST:  Gadobutrol (Gadavist) 10 mL of gadobutrol injection (MULTI-DOSE)     TECHNICAL LIMITATIONS: None      FINDINGS:     PROSTATE GLAND:  -VOLUME: 60 4 ml   -PERIPHERAL ZONE:  There is mild peripheral zone heterogeneity without focal suspicious lesion  -TRANSITION ZONE:  Multiple BPH nodules are identified  Yasmeen Arbour -CENTRAL ZONE: Normal   -ANTERIOR FIBROMUSCULAR STROMA:  Within normal limits  Yasmeen Arbour -"CAPSULE": Intact      SEMINAL VESICLES: Within normal limits        PELVIC CAVITY:  -LYMPH NODES: There is a borderline left external iliac lymph node measuring 9 mm though this appears to be mostly fatty replaced and likely benign  -BLADDER: Unremarkable  -ADDITIONAL FINDINGS: There is diverticulosis coli      OSSEOUS STRUCTURES:   Normal marrow signal  No evidence of bone metastases        IMPRESSION:     1  No suspicious lesion  Nodules enlarge prostate with BPH nodules and mild peripheral zone heterogeneity      2    Borderline left external iliac lymph node though mostly fatty replaced and likely benign      3  Diverticulosis coli      PI-RADS v2 Assessment Category: PI-RADS 2: Low (clinically significant cancer is unlikely to be present)  PATHOLOGY:     2/27/19 - Read by Dr Alfonzo Parnell in review  Final Diagnosis   A  Prostate, left lateral base, biopsy:     - No significant pathologic abnormalities  - No high grade prostatic intraepithelial neoplasia (HGPIN) or carcinoma identified      B  Prostate, left lateral mid, biopsy:     - No significant pathologic abnormalities  - No high grade prostatic intraepithelial neoplasia (HGPIN) or carcinoma identified      C  Prostate, left lateral apex, biopsy:     - Small focus of prostatic adenocarcinoma, francisco j score 3+3=6 (grade Group 1), involving less than 5% of        one (1) core  See note      D  Prostate, left medial base, biopsy:     - No significant pathologic abnormalities  - No high grade prostatic intraepithelial neoplasia (HGPIN) or carcinoma identified      E  Prostate, left medial mid, biopsy:     - No significant pathologic abnormalities  - No high grade prostatic intraepithelial neoplasia (HGPIN) or carcinoma identified      F  Prostate, left medial apex, biopsy:     - No significant pathologic abnormalities  - No high grade prostatic intraepithelial neoplasia (HGPIN) or carcinoma identified      G  Prostate, right lateral base, biopsy:     - No significant pathologic abnormalities  - No high grade prostatic intraepithelial neoplasia (HGPIN) or carcinoma identified      H  Prostate, right lateral mid, biopsy:     - Prostatic adenocarcinoma, Francisco J score 3+3+6 (Grade Group 1)Discontinuously involving 10% of        one (1) core  See note      I  Prostate, right lateral apex, biopsy:     - No significant pathologic abnormalities       - No high grade prostatic intraepithelial neoplasia (HGPIN) or carcinoma identified      J   Prostate, right medial base, biopsy:     - No significant pathologic abnormalities  - No high grade prostatic intraepithelial neoplasia (HGPIN) or carcinoma identified      K  Prostate, right medial mid, biopsy:     - No significant pathologic abnormalities  - No high grade prostatic intraepithelial neoplasia (HGPIN) or carcinoma identified      L  Prostate, right medial apex, biopsy     - No significant pathologic abnormalities  - No high grade prostatic intraepithelial neoplasia (HGPIN) or carcinoma identified  ASSESSMENT:     62 y o  male with low volume very low risk cT1c adenocarcinoma of the prostate, now s/p mpMRI    PLAN:     Patient is approaching the 1 year anniversary of his prostate cancer diagnosis  He will be approaching the need for a confirmatory biopsy  We performed a multiparametric MRI which was negative for occult lesion  I have recommended going forward with a perineal biopsy for more aggressive anterior and apical sampling  I discussed the difference in the benefits of a perineal biopsy as opposed to a transrectal approach  After discussion of risks and benefits, he has previously signed consent for transperineal prostate biopsy

## 2020-02-21 NOTE — ANESTHESIA PREPROCEDURE EVALUATION
Review of Systems/Medical History  Patient summary reviewed  Chart reviewed      Cardiovascular  Hyperlipidemia, Hypertension ,    Pulmonary  Negative pulmonary ROS        GI/Hepatic  Negative GI/hepatic ROS          Negative  ROS        Endo/Other  Negative endo/other ROS      GYN  Negative gynecology ROS          Hematology      Comment: Prostate cancer Musculoskeletal  Negative musculoskeletal ROS        Neurology  Negative neurology ROS      Psychology   Negative psychology ROS                   Anesthesia Plan  ASA Score- 2     Anesthesia Type- general with ASA Monitors  Additional Monitors:   Airway Plan: LMA  Plan Factors-    Induction- intravenous  Postoperative Plan- Plan for postoperative opioid use  Planned trial extubation    Informed Consent- Anesthetic plan and risks discussed with patient  I personally reviewed this patient with the CRNA  Discussed and agreed on the Anesthesia Plan with the CRNA  Pratima Lopez

## 2020-02-21 NOTE — ANESTHESIA POSTPROCEDURE EVALUATION
Post-Op Assessment Note    CV Status:  Stable  Pain Score: 0    Pain management: adequate     Mental Status:  Sleepy   Hydration Status:  Euvolemic   PONV Controlled:  Controlled   Airway Patency:  Patent   Post Op Vitals Reviewed: Yes      Staff: CRNA   Comments: vss sv nonobstructed uneventful          BP   135/63   Temp   97 5   Pulse 48   Resp   17   SpO2   98%

## 2020-02-21 NOTE — ANESTHESIA PREPROCEDURE EVALUATION
Review of Systems/Medical History  Patient summary reviewed  Chart reviewed      Cardiovascular  Hyperlipidemia, Hypertension ,    Pulmonary  Negative pulmonary ROS        GI/Hepatic            Endo/Other  Negative endo/other ROS      GYN  Negative gynecology ROS          Hematology  Negative hematology ROS      Musculoskeletal  Negative musculoskeletal ROS        Neurology  Negative neurology ROS      Psychology   Negative psychology ROS

## 2020-02-21 NOTE — DISCHARGE INSTRUCTIONS
Prostate Biopsy   WHAT YOU NEED TO KNOW:   What do I need to know about a prostate biopsy? A prostate biopsy is a procedure to remove samples of tissue from your prostate gland  The prostate is a male sex gland that makes fluid found in semen  It is located just below the bladder  After the samples are removed, they are sent to a lab and tested for cancer  How do I prepare for a prostate biopsy? · Your healthcare provider will talk to you about how to prepare for this procedure  He may tell you not to eat or drink anything after midnight on the day of your surgery  You will need to stop taking blood thinners several days before your procedure  Examples of blood thinners include warfarin and NSAIDs  You may also need to stop taking herbal supplements before your procedure  Your healthcare provider will tell you what other medicines to take or not take on the day of your procedure  · You will be given an antibiotic to help prevent a bacterial infection  You may need to give yourself an enema (liquid medicine put in your rectum) to help empty your bowel before your procedure  What will happen during a prostate biopsy? · You may need to lie on your side with your knees pulled up toward your chest  Numbing cream or gel may be put into your rectum, or numbing medicine may be injected near your prostate  You may instead be given general anesthesia to keep you asleep and free from pain during the procedure  A biopsy sample may be taken through your rectum, urethra, or perineum  The perineum is the area between your scrotum and rectum  Most of the time, biopsy samples are taken through the rectum  · If your healthcare provider takes a sample through your rectum, he will insert a small ultrasound probe into your rectum  The ultrasound shows pictures of your prostate on a monitor, and is used to help guide the biopsy needle   Your healthcare provider will push the biopsy needle through the wall of your rectum and into your prostate gland  Your healthcare provider will remove between 6 to 12 samples of tissue from different areas of your prostate gland  The samples will be sent to a lab and tested for cancer  What will happen after a prostate biopsy? You may feel soreness at the biopsy site  You may need to take antibiotics for up to 2 days after your procedure to help prevent an infection  You may have some bleeding from your rectum  You may also have blood in your urine, bowel movements, or semen  What are the risks of a prostate biopsy? You may bleed more than expected or get an infection in your urinary tract or prostate gland  The infection may spread to your blood and the rest of your body  Your bladder may not empty completely when you urinate  You may need a catheter to help empty your bladder for a short period of time  Cancer cells may be missed during your biopsy procedure  You may need another prostate biopsy to check for cancer again  CARE AGREEMENT:   You have the right to help plan your care  Learn about your health condition and how it may be treated  Discuss treatment options with your caregivers to decide what care you want to receive  You always have the right to refuse treatment  The above information is an  only  It is not intended as medical advice for individual conditions or treatments  Talk to your doctor, nurse or pharmacist before following any medical regimen to see if it is safe and effective for you  © 2017 [employer] Information is for End User's use only and may not be sold, redistributed or otherwise used for commercial purposes  All illustrations and images included in CareNotes® are the copyrighted property of [employer]  or Arnul[employer] Jacome

## 2020-02-21 NOTE — OP NOTE
OPERATIVE REPORT  PATIENT NAME: Jinger Opitz    :  1955  MRN: [id number]  Pt Location: [facility] OR ROOM 04    SURGERY DATE: 2020    Surgeon(s) and Role:     * Lilliana Inman MD - Primary    Preop Diagnosis:  Prostate cancer ([facility] [address] ) [place]    Post-Op Diagnosis Codes:     * Prostate cancer ([address] ) [place]    Procedure(s) (LRB):  TRANSRECTAL NEEDLE BIOPSY PROSTATE (TRNBP) TRANSPERINEAL APPROACH (N/A)    Specimen(s):  ID Type Source Tests Collected by Time Destination   1 : right base Tissue Prostate TISSUE EXAM Lilliana Inman MD 2020 1213    2 : left posterior lateral Tissue Prostate TISSUE EXAM Lilliana Inman MD 2020 1213    3 : left posterior medial Tissue Prostate TISSUE EXAM Lilliana Inman MD 2020 1213    4 : left anterior lateral Tissue Prostate TISSUE EXAM Lilliana Inman MD 2020 1213    5 : left anterior medial Tissue Prostate TISSUE EXAM Lilliana Inman MD 2020 1213    6 : left base Tissue Prostate TISSUE EXAM Lilliana Inman MD 2020 1213    7 : right posterior medial Tissue Prostate TISSUE EXAM Lilliana Inman MD 2020 1214    8 : right posterior lateral Tissue Prostate TISSUE EXAM Lilliana Inman MD 2020 1214    9 : right anterior lateral Tissue Prostate TISSUE EXAM Lilliana Inman MD 2020 1214    10 : right anterior medial Tissue Prostate TISSUE EXAM Lilliana Inman MD 2020 1214        Estimated Blood Loss:   Minimal    Drains:  Urethral Catheter Latex 16 Fr  (Active)   Number of days: 0       Anesthesia Type:   General    Operative Indications:  Prostate cancer ([facility] [address] ) [C61]      Operative Findings:  1   Prostate biopsies from the right and left anterior medial anterior lateral posterior medial posterior lateral and base from 10 total geographies    Complications:   None    Procedure and Technique:  Jinger Opitz is a 62 y o  male with low risk prostate cancer history on surveillance  The patient was counseled regarding their options and ultimately opted to proceed with transperineal prostate biopsy  Risks and benefits of the procedure were described and the patient signed an informed consent  On 2/21/2020, the patient proceeded to the operating room  They were laid supine on the operating room table and a pre-procedure time out was performed with all parties present and in agreement of the procedure planned and laterality  Patient received intravenous antibiotics in the form of ceftriaxone and sequential compression devices were placed on bilateral lower extremities  They were then induced with a general LMA anesthetic  He was placed in dorsal lithotomy with care to pad all pressure points  His perineum and genitalia were prepped with a ChloraPrep solution  Sterile Iodoband was placed over the perineum above the rectum  Side fire biplanar transrectal ultrasound was performed and measurements of the prostate gland were taken as above  In the midportion of the left and right prostate, a lizzie was denoted in the perineal skin  Skin wheal was elevated with 5 cc of 1% lidocaine plain on either side  The PrecisionPoint device was then introduced into the left perineal subcutaneous tissue  We visualized the tip of the needle  Spinal needle was introduced through the subcutaneous fat and into the pelvic floor muscle where a perineal pudendal block was performed with additional 5 cc of 1% lidocaine  This was repeated on the patient's right side  On the right side of the prostate, we performed serial biopsies of the right posterior medial peripheral zone, right posterior lateral peripheral zone and moving up the anterior horn to the right anterior lateral and right anteromedial jog Rapide  Separate specimen was taken from the right-sided prostate base  Several areas had multiple cores taken      The PrecisionPoint device was repositioned into the left perineal stab   The biopsies were undertaken in the same fashion on the left side  Left posterior medial, left posterior lateral, left anterior lateral, left anteromedial and left base  The transplant rectal ultrasound probe was removed  The eye band was retracted and there was some urethral bleeding  A 16 [place] silicone catheter was placed per urethra and the urine was noted to be clear  Some gentle pressure was placed on the perineum for approximately 5 minutes  At the completion of the procedure, the patient was taken out of dorsal lithotomy, extubated and transferred to PACU in good condition        I was present for the entire procedure    Patient Disposition:  PACU     SIGNATURE: Balbir Carlton MD  DATE: February 21, 2020  TIME: 12:59 PM

## 2020-02-25 ENCOUNTER — TELEPHONE (OUTPATIENT)
Dept: UROLOGY | Facility: CLINIC | Age: 65
End: 2020-02-25

## 2020-02-25 DIAGNOSIS — C61 PROSTATE CANCER (HCC): Primary | ICD-10-CM

## 2020-02-25 NOTE — TELEPHONE ENCOUNTER
Called patient to review pathology  Patient is very pleased  Will cancel pathology follow-up on the 6th of March  Patient will return to see us in 6 months with PSA

## 2020-07-22 NOTE — PROGRESS NOTES
Assessment/Plan:         Diagnoses and all orders for this visit:    Visit for preventive health examination  Colonoscopy due spring 2019 - pt aware  Will get labs in spring  Increase hydration  Rto 1 year           Patient ID: Edilma Spain is a 59 y o  male  HPI   Generally has been well  Following with urology for elevated psa  He injured heel recently and still has some pain  No chest pain or sob        Review of Systems   Constitutional: Negative  HENT: Negative  Eyes: Negative  Respiratory: Negative  Cardiovascular: Negative  Gastrointestinal: Negative  Endocrine: Negative  Genitourinary: Negative  Musculoskeletal: Positive for arthralgias  Skin: Negative  Allergic/Immunologic: Negative  Neurological: Negative  Hematological: Negative  Psychiatric/Behavioral: Negative  /68   Pulse 68   Temp (!) 96 8 °F (36 °C) (Tympanic)   Ht 5' 9" (1 753 m)   Wt 98 9 kg (218 lb 2 oz)   SpO2 96%   BMI 32 21 kg/m²          Physical Exam   Constitutional: He is oriented to person, place, and time  He appears well-developed and well-nourished  No distress  HENT:   Head: Normocephalic and atraumatic  Right Ear: External ear normal    Left Ear: External ear normal    Nose: Nose normal    Mouth/Throat: Oropharynx is clear and moist  No oropharyngeal exudate  Eyes: Pupils are equal, round, and reactive to light  Conjunctivae and EOM are normal  No scleral icterus  Neck: Normal range of motion  Neck supple  No JVD present  Cardiovascular: Normal rate, regular rhythm, normal heart sounds and intact distal pulses  No murmur heard  Pulmonary/Chest: Effort normal and breath sounds normal    Abdominal: Soft  Bowel sounds are normal    Musculoskeletal: Normal range of motion  He exhibits no edema, tenderness or deformity  Lymphadenopathy:     He has no cervical adenopathy  Neurological: He is alert and oriented to person, place, and time   He has normal headaches, dizziness x 2 weeks, shortness of breath today reflexes  He displays normal reflexes  No cranial nerve deficit  He exhibits normal muscle tone  Coordination normal    Skin: Skin is warm and dry  He is not diaphoretic  Psychiatric: He has a normal mood and affect  His behavior is normal  Judgment and thought content normal    Nursing note and vitals reviewed

## 2020-08-28 ENCOUNTER — OFFICE VISIT (OUTPATIENT)
Dept: UROLOGY | Facility: CLINIC | Age: 65
End: 2020-08-28
Payer: COMMERCIAL

## 2020-08-28 VITALS
SYSTOLIC BLOOD PRESSURE: 136 MMHG | DIASTOLIC BLOOD PRESSURE: 84 MMHG | HEART RATE: 60 BPM | TEMPERATURE: 97.5 F | WEIGHT: 201.2 LBS | BODY MASS INDEX: 30.49 KG/M2 | HEIGHT: 68 IN

## 2020-08-28 DIAGNOSIS — C61 PROSTATE CANCER (HCC): Primary | ICD-10-CM

## 2020-08-28 PROCEDURE — 99213 OFFICE O/P EST LOW 20 MIN: CPT | Performed by: UROLOGY

## 2020-08-28 PROCEDURE — 1036F TOBACCO NON-USER: CPT | Performed by: UROLOGY

## 2020-08-28 PROCEDURE — 3075F SYST BP GE 130 - 139MM HG: CPT | Performed by: UROLOGY

## 2020-08-28 PROCEDURE — 3079F DIAST BP 80-89 MM HG: CPT | Performed by: UROLOGY

## 2020-08-28 PROCEDURE — 3008F BODY MASS INDEX DOCD: CPT | Performed by: UROLOGY

## 2020-08-28 NOTE — PROGRESS NOTES
UROLOGY FOLLOWUP NOTE     CHIEF COMPLAINT   Isabel Hartmann is a 62 y o  male with a complaint of   Chief Complaint   Patient presents with    Follow-up    Prostate Check    Elevated PSA     PSA=7 09 (8/21/2020)     History of Present Illness:     62 y o  male with recent PSA check of 5 1  Patient has been undergoing health fair PSAs for the last handful of years  He has not had a digital rectal exam for the last 3 years  He is  and has no family history of prostate cancer  His father in law does have prostate cancer and has been treated with radiation and androgen deprivation therapy  He presents for discussion  He has some mild low level urinary urgency and hesitancy  He is not interested in treatment for this  He is very healthy and exercises and lift weights regularly  AUA SS 13 QoL 2    We continued to follow the patient's PSA after the initial visit and noted continued to rise  I discussed with the patient by phone my recommendation to proceed with a prostate biopsy and he agreed  He presents today for this procedure  Aspirin held  Patient works as a  and yesterday fell office 17 ft roof  He did break his radius and the med a carpal bone  He has some other scratches and bruises but is otherwise fine  PSA 8/21/20 - 7 09  PSA 11/29/19 - 4 9  PSA 6/2/19 - 5 7  PSA 12/7/18 - 6 2  PSA 11/30/18 - 4 8  PSA 3/17/2018 - 5 1  PSA 3/19/2017 - 3 9  PSA 3/20/2016 - 2 5  PSA 2015 - 3 7  PSA 2014 - 2 7  PSA 2013 - 2 5      Patient underwent multiparametric MRI and 1 year confirmatory biopsy in February of 2020  He underwent a perineal biopsy  This was benign  He returns for six-month interval PSA checked  There has been a slight elevation in his PSA  Patient does report some occasional urinary hesitancy in the early mornings    He has no issues at night and only gets up once or twice at most   He is retired and has been physically active and has changed his diet with some weight loss  He feels well  Past Medical History:     Past Medical History:   Diagnosis Date    Colon polyp     Hypertension        PAST SURGICAL HISTORY:     Past Surgical History:   Procedure Laterality Date    CARPAL TUNNEL RELEASE Bilateral     COLONOSCOPY      FRACTURE SURGERY Right     LEG SURGERY Right     tumor removal     PROSTATE BIOPSY  02/27/2019    PROSTATE BIOPSY N/A 2/21/2020    Procedure: TRANSRECTAL NEEDLE BIOPSY PROSTATE (TRNBP) TRANSPERINEAL APPROACH;  Surgeon: Anam Ruiz MD;  Location: [facility];  Service: Urology       CURRENT MEDICATIONS:     Current Outpatient Medications   Medication Sig Dispense Refill    Ascorbic Acid (VITAMIN C PO) Take by mouth daily      losartan-hydrochlorothiazide (HYZAAR) 50-12 5 mg per tablet Take 1 tablet by mouth daily in the early morning 90 tablet 3    MULTIPLE VITAMINS ESSENTIAL PO Take 1 tablet by mouth daily      NON FORMULARY 2 (two) times a day Super Beta Prostate supplement      traMADol (ULTRAM) 50 mg tablet Take 1 tablet (50 mg total) by mouth every 6 (six) hours as needed for moderate pain for up to 5 doses 5 tablet 0     No current facility-administered medications for this visit  ALLERGIES:   No Known Allergies    SOCIAL HISTORY:     Social History     Socioeconomic History    Marital status: /Civil Union     Spouse name: Not on file    Number of children: Not on file    Years of education: Not on file    Highest education level: Not on file   Occupational History    Not on file   Social Needs    Financial resource strain: Not on file    Food insecurity     Worry: Not on file     Inability: Not on file   [employer] needs     Medical: Not on file     Non-medical: Not on file   Tobacco Use    Smoking status: Never Smoker    Smokeless tobacco: Never Used   Substance and Sexual Activity    Alcohol use:  Yes     Alcohol/week: 3 0 standard drinks     Types: 3 Shots of liquor per week Frequency: 2-3 times a week     Drinks per session: 1 or 2     Binge frequency: Never     Comment: socially    Drug use: Never    Sexual activity: Not on file   Lifestyle    Physical activity     Days per week: Not on file     Minutes per session: Not on file    Stress: Not on file   Relationships    Social connections     Talks on phone: Not on file     Gets together: Not on file     Attends Zoroastrianism service: Not on file     Active member of club or organization: Not on file     Attends meetings of clubs or organizations: Not on file     Relationship status: Not on file    Intimate partner violence     Fear of current or ex partner: Not on file     Emotionally abused: Not on file     Physically abused: Not on file     Forced sexual activity: Not on file   Other Topics Concern    Not on file   Social History Narrative    Caffeine use    Dental care, regularly    Exercises 3 to 4 times per week    Lives independently with spouse    Sun protection sunscreen    Uses safety equipment-seatbelts           SOCIAL HISTORY:     Family History   Problem Relation Age of Onset    Glaucoma Mother     Hyperlipidemia Mother     Heart attack Father     Hyperlipidemia Father     Glaucoma Maternal Grandmother        REVIEW OF SYSTEMS:     Review of Systems   Constitutional: Negative  Respiratory: Negative  Cardiovascular: Negative  Gastrointestinal: Negative  Genitourinary: Negative for difficulty urinating, enuresis and urgency  Musculoskeletal: Negative  Neurological: Negative  PHYSICAL EXAM:     Temp 97 5 °F (36 4 °C)   Ht 5' 8" (1 727 m)   Wt 91 3 kg (201 lb 3 2 oz)   BMI 30 59 kg/m²     General:  Healthy appearing male in no acute distress  They have a normal affect  There is not appear to be any gross neurologic defects or abnormalities  Multiple scratches of the face  HEENT:  Normocephalic, atraumatic    Neck is supple without any palpable lymphadenopathy  Cardiovascular:  Patient has normal palpable distal radial pulses  There is no significant peripheral edema  No JVD is noted  Respiratory:  Patient has unlabored respirations  There is no audible wheeze or rhonchi  Abdomen:  Abdomen is without surgical scars  Right anterior thigh scar  Abdomen is soft and nontender  There is no tympany  Inguinal and umbilical hernia are not appreciated  :   Rectal exam performed, bulbous prostate which is enlarged but smooth without new nodules or concerns, this is a stable examination  Musculoskeletal:  Patient does not have significant CVA tenderness in the  flank with palpation or percussion  They full range of motion in all 4 extremities  Strength in all 4 extremities appears congruent  Patient is able to ambulate without assistance or difficulty  Dermatologic:  Patient has no skin abnormalities or rashes  LABS:     CBC:   Lab Results   Component Value Date    WBC 7 17 2019    HGB 14 6 2019    HCT 43 4 2019    MCV 90 2019     2019       BMP:   Lab Results   Component Value Date    CALCIUM 9 0 2019    K 4 2 2019    CO2 30 2019     2019    BUN 18 2019    CREATININE 1 37 (H) 2019     PSA 20 - 7 09  PSA 19 - 4 9  PSA 19 - 5 7  PSA 18 - 6 2  PSA 18 - 4 8  PSA 3/17/2018 - 5 1  PSA 3/19/2017 - 3 9  PSA 3/20/2016 - 2 5  PSA  - 3 7  PSA  - 2 7  PSA  - 2 5    IMAGIN/7/19  MULTIPARAMETRIC MRI OF THE PROSTATE WITH AND WITHOUT CONTRAST      INDICATION:   C61: Malignant neoplasm of prostate  On active surveillance      COMPARISON: None     PSA LEVEL: 6 2 ng/ml  2018  PRIOR BIOPSY DATE: 2019    BIOPSY RESULTS: [place] 6 prostate cancer involving the left lateral apex and right lateral mid gland      TECHNIQUE: The following pulse sequences were obtained:  T1w axial; T2w axial, sagittal and coronal; DWI axial and ADC map; T1w water, fat, in-phase and opposed-phase axials of entire pelvis and dynamic 3D T1w axial before and during IV contrast   injection         CONTRAST:  Gadobutrol (Gadavist) 10 mL of gadobutrol injection (MULTI-DOSE)     TECHNICAL LIMITATIONS: None      FINDINGS:     PROSTATE GLAND:  -VOLUME: 60 4 ml   -PERIPHERAL ZONE:  There is mild peripheral zone heterogeneity without focal suspicious lesion  -TRANSITION ZONE:  Multiple BPH nodules are identified  Terrance Cokato -CENTRAL ZONE: Normal   -ANTERIOR FIBROMUSCULAR STROMA:  Within normal limits  Terrance Cokato -"CAPSULE": Intact      SEMINAL VESICLES: Within normal limits        PELVIC CAVITY:  -LYMPH NODES: There is a borderline left external iliac lymph node measuring 9 mm though this appears to be mostly fatty replaced and likely benign  -BLADDER: Unremarkable  -ADDITIONAL FINDINGS: There is diverticulosis coli      OSSEOUS STRUCTURES:   Normal marrow signal  No evidence of bone metastases        IMPRESSION:     1  No suspicious lesion  Nodules enlarge prostate with BPH nodules and mild peripheral zone heterogeneity      2  Borderline left external iliac lymph node though mostly fatty replaced and likely benign      3  Diverticulosis coli      PI-RADS v2 Assessment Category: PI-RADS 2: Low (clinically significant cancer is unlikely to be present)  PATHOLOGY:     2/21/20  Final Diagnosis    A  Right base prostate core biopsies:  - Benign prostatic tissue  (confirmed via multiplex staining for , p63, and p504s)     B  Left posterolateral prostate core biopsies:  - Benign prostatic tissue       C  Left posterior medial prostate core biopsies:  - Benign prostatic tissue     D  Left anterior lateral prostate core biopsies:  - Benign prostatic tissue      E  Left anterior medial prostate core biopsies:  - Benign prostatic tissue     F  Left base prostate core biopsies:  - Benign prostatic tissue       G  Right posterior medial prostate core biopsies:  - Benign prostatic tissue     H   Right posterolateral prostate core biopsies: - Benign prostatic tissue     I  Right anterior lateral prostate core biopsies:  - Benign prostatic tissue     J  Right anterior medial prostate core biopsies:    - Benign prostatic tissue       2/27/19 - Read by Dr Dario Waltres in review  Final Diagnosis   A  Prostate, left lateral base, biopsy:     - No significant pathologic abnormalities  - No high grade prostatic intraepithelial neoplasia (HGPIN) or carcinoma identified      B  Prostate, left lateral mid, biopsy:     - No significant pathologic abnormalities  - No high grade prostatic intraepithelial neoplasia (HGPIN) or carcinoma identified      C  Prostate, left lateral apex, biopsy:     - Small focus of prostatic adenocarcinoma, francisco j score 3+3=6 (grade Group 1), involving less than 5% of        one (1) core  See note      D  Prostate, left medial base, biopsy:     - No significant pathologic abnormalities  - No high grade prostatic intraepithelial neoplasia (HGPIN) or carcinoma identified      E  Prostate, left medial mid, biopsy:     - No significant pathologic abnormalities  - No high grade prostatic intraepithelial neoplasia (HGPIN) or carcinoma identified      F  Prostate, left medial apex, biopsy:     - No significant pathologic abnormalities  - No high grade prostatic intraepithelial neoplasia (HGPIN) or carcinoma identified      G  Prostate, right lateral base, biopsy:     - No significant pathologic abnormalities  - No high grade prostatic intraepithelial neoplasia (HGPIN) or carcinoma identified      H  Prostate, right lateral mid, biopsy:     - Prostatic adenocarcinoma, [place] score 3+3+6 (Grade Group 1)Discontinuously involving 10% of        one (1) core  See note      I  Prostate, right lateral apex, biopsy:     - No significant pathologic abnormalities       - No high grade prostatic intraepithelial neoplasia (HGPIN) or carcinoma identified      J   Prostate, right medial base, biopsy:     - No significant pathologic abnormalities  - No high grade prostatic intraepithelial neoplasia (HGPIN) or carcinoma identified      K  Prostate, right medial mid, biopsy:     - No significant pathologic abnormalities  - No high grade prostatic intraepithelial neoplasia (HGPIN) or carcinoma identified      L  Prostate, right medial apex, biopsy     - No significant pathologic abnormalities  - No high grade prostatic intraepithelial neoplasia (HGPIN) or carcinoma identified  ASSESSMENT:     62 y o  male with low volume very low risk cT1c adenocarcinoma of the prostate, now s/p mpMRI and confirmatory biopsy    PLAN:      the patient's PSA has risen however I have reassured him in light of his normal examination, recent multiparametric MRI and confirmatory biopsy  We have recommended a PSA check in 3 months and again in 6 months with a visit  He would not be due for standard biopsy the active surveillance protocol until year for  Patient has some mild urinary symptoms which he is not overwhelmingly bothered by  We will continue to follow these  He is not interested in medication therapy at this time

## 2020-11-24 ENCOUNTER — TELEPHONE (OUTPATIENT)
Dept: UROLOGY | Facility: CLINIC | Age: 65
End: 2020-11-24

## 2020-11-24 DIAGNOSIS — C61 PROSTATE CANCER (HCC): Primary | ICD-10-CM

## 2020-12-18 ENCOUNTER — OFFICE VISIT (OUTPATIENT)
Dept: INTERNAL MEDICINE CLINIC | Facility: CLINIC | Age: 65
End: 2020-12-18
Payer: COMMERCIAL

## 2020-12-18 VITALS
HEIGHT: 68 IN | DIASTOLIC BLOOD PRESSURE: 70 MMHG | SYSTOLIC BLOOD PRESSURE: 126 MMHG | TEMPERATURE: 97.7 F | WEIGHT: 208.25 LBS | BODY MASS INDEX: 31.56 KG/M2

## 2020-12-18 DIAGNOSIS — Z23 FLU VACCINE NEED: ICD-10-CM

## 2020-12-18 DIAGNOSIS — I10 HYPERTENSION, UNSPECIFIED TYPE: ICD-10-CM

## 2020-12-18 DIAGNOSIS — Z00.00 ANNUAL PHYSICAL EXAM: Primary | ICD-10-CM

## 2020-12-18 DIAGNOSIS — C61 PROSTATE CANCER (HCC): ICD-10-CM

## 2020-12-18 PROCEDURE — 90682 RIV4 VACC RECOMBINANT DNA IM: CPT | Performed by: INTERNAL MEDICINE

## 2020-12-18 PROCEDURE — 3078F DIAST BP <80 MM HG: CPT | Performed by: INTERNAL MEDICINE

## 2020-12-18 PROCEDURE — 1036F TOBACCO NON-USER: CPT | Performed by: INTERNAL MEDICINE

## 2020-12-18 PROCEDURE — 3074F SYST BP LT 130 MM HG: CPT | Performed by: INTERNAL MEDICINE

## 2020-12-18 PROCEDURE — 3725F SCREEN DEPRESSION PERFORMED: CPT | Performed by: INTERNAL MEDICINE

## 2020-12-18 PROCEDURE — 3008F BODY MASS INDEX DOCD: CPT | Performed by: INTERNAL MEDICINE

## 2020-12-18 PROCEDURE — 90471 IMMUNIZATION ADMIN: CPT | Performed by: INTERNAL MEDICINE

## 2020-12-18 PROCEDURE — 99396 PREV VISIT EST AGE 40-64: CPT | Performed by: INTERNAL MEDICINE

## 2020-12-18 RX ORDER — LOSARTAN POTASSIUM AND HYDROCHLOROTHIAZIDE 12.5; 5 MG/1; MG/1
1 TABLET ORAL
Qty: 90 TABLET | Refills: 3 | Status: SHIPPED | OUTPATIENT
Start: 2020-12-18 | End: 2021-12-21 | Stop reason: SDUPTHER

## 2020-12-21 ENCOUNTER — LAB (OUTPATIENT)
Dept: LAB | Facility: MEDICAL CENTER | Age: 65
End: 2020-12-21
Payer: COMMERCIAL

## 2020-12-21 DIAGNOSIS — C61 PROSTATE CANCER (HCC): ICD-10-CM

## 2020-12-21 LAB
ANION GAP SERPL CALCULATED.3IONS-SCNC: 3 MMOL/L (ref 4–13)
BUN SERPL-MCNC: 15 MG/DL (ref 5–25)
CALCIUM SERPL-MCNC: 9.7 MG/DL (ref 8.3–10.1)
CHLORIDE SERPL-SCNC: 104 MMOL/L (ref 100–108)
CO2 SERPL-SCNC: 32 MMOL/L (ref 21–32)
CREAT SERPL-MCNC: 1.28 MG/DL (ref 0.6–1.3)
GFR SERPL CREATININE-BSD FRML MDRD: 59 ML/MIN/1.73SQ M
GLUCOSE P FAST SERPL-MCNC: 100 MG/DL (ref 65–99)
POTASSIUM SERPL-SCNC: 4.4 MMOL/L (ref 3.5–5.3)
SODIUM SERPL-SCNC: 139 MMOL/L (ref 136–145)

## 2020-12-21 PROCEDURE — 36415 COLL VENOUS BLD VENIPUNCTURE: CPT

## 2020-12-21 PROCEDURE — 80048 BASIC METABOLIC PNL TOTAL CA: CPT

## 2020-12-22 ENCOUNTER — HOSPITAL ENCOUNTER (OUTPATIENT)
Dept: RADIOLOGY | Age: 65
Discharge: HOME | End: 2020-12-22
Payer: COMMERCIAL

## 2020-12-22 DIAGNOSIS — C61 PROSTATE CANCER (HCC): ICD-10-CM

## 2020-12-22 PROCEDURE — 72197 MRI PELVIS W/O & W/DYE: CPT

## 2020-12-22 PROCEDURE — A9585 GADOBUTROL INJECTION: HCPCS | Performed by: UROLOGY

## 2020-12-22 PROCEDURE — G1004 CDSM NDSC: HCPCS

## 2020-12-22 PROCEDURE — 76377 3D RENDER W/INTRP POSTPROCES: CPT

## 2020-12-22 RX ADMIN — GADOBUTROL 9 ML: 604.72 INJECTION INTRAVENOUS at 09:14

## 2020-12-31 ENCOUNTER — TELEPHONE (OUTPATIENT)
Dept: UROLOGY | Facility: CLINIC | Age: 65
End: 2020-12-31

## 2020-12-31 DIAGNOSIS — C61 PROSTATE CANCER (HCC): Primary | ICD-10-CM

## 2021-01-18 NOTE — PATIENT INSTRUCTIONS
Decision Aid for Clinically Localized Prostate Cancer   AMBULATORY CARE:   What you need to know about decisions for clinically localized prostate cancer: You can work with your healthcare provider to make decisions about being screened or treated for prostate cancer  Screening is a test done to find prostate cancer early  Screening is different from diagnosis because screening is used before you first start to have signs or symptoms  This means management or treatment can start early  You can also help plan treatment if cancer is found with screening, or you develop it later on  What you need to know about clinically localized prostate cancer:   · The prostate is a small gland that is part of the reproductive system  It sits around your urethra (tube that carries urine out of your bladder)  Cancer cells start to grow inside the prostate gland  The cells form a mass that continues to grow if left untreated  Clinically localized means that the cancer has not moved beyond the prostate gland  · Prostate cancer is the second most common form of cancer in men (skin cancer is most common)  About 17% of men in the [place] develop prostate cancer  About 3% of men in the [place] die from prostate cancer  · Prostate cancer often grows slowly  Sometimes the tumor does not grow at all  The cancer may not grow quickly enough to be life-threatening in your lifetime  · The risk for prostate cancer increases with age  Your risk is highest if you are older than 65 years  Your risk is lowest if you are younger than 45 years  Your risk is also increased if you have a history of prostate cancer in your father, brother, or son  · You may have no signs or symptoms of prostate cancer until the tumor grows large  You may have trouble urinating or keeping a strong urine stream because of the tumor  At later stages, prostate cancer can spread to your bones or lymph nodes  You may have bone pain or fractures    How to know if you are a good candidate for prostate cancer screening:  Screening may be helpful for you if any of the following is true:  · You are 72 years or older  · You have a family history of prostate cancer or other prostate problems  · You do not have another health condition that may prevent you from living longer than another 10 years  · You want to have treatment as early as possible if needed  · You are willing to have screening as often as recommended by your healthcare provider  How screening is done:   · A digital rectal exam  is used to check your prostate  Your healthcare provider will insert a gloved finger into your rectum  The provider will be able to feel your prostate for lumps or hard areas that could be tumors  The exam may be repeated over time to check for new or worsening problems  · A PSA test  is used to measure the amount of a protein made by your prostate gland  A blood sample is taken for this test  A high PSA level may be a sign of prostate cancer  Benefits and risks of screening:  Talk with your healthcare provider about the risks and benefits of screening:  · Benefits  include finding prostate cancer early  Cancer treatment is often more successful when it starts early  This means you can make more decisions about treatment  · Risks  include the following:     ¨ You may have a false belief that you will not develop prostate cancer if your screening result is negative  You can still develop prostate cancer later on  ¨ A PSA test can give a false-negative result  This means the result looks like you do not have cancer even though you do  Treatment or monitoring may be delayed because the tests suggested you do not have cancer  ¨ The PSA test can also give a false-positive result  This means you do not actually have cancer but the test shows you do  You may get more tests, a biopsy, or even treatments that are not needed   It can also be stressful to think you have prostate cancer when you do not  Questions to ask your healthcare provider to help you make decisions about screening:   · How high is my risk for prostate cancer? · How often do I need to have screening? · Where is the screening done? · Do I need to do anything to get ready to have screening? What happens after you have screening:   · You will meet with your healthcare provider to go over the results of your screening  · You may need more tests to diagnose anything that showed up on the screening test  Only a biopsy (tissue sample) can show for sure that you have prostate cancer  · After cancer is found, your healthcare provider will assign a number called a Byron score  The number can help you understand how quickly the cancer is likely to grow, and if it may spread:     ¨ A number of 6 or lower means the cancer is likely to grow more slowly  ¨ A score of 7 means it is likely to grow faster, but it may not spread to other areas  ¨ A score of 8 to 10 means it is likely to grow more quickly and also spread  · Your healthcare provider will also assign a T stage to the tumor  This number shows the growth of the tumor and if it is likely to spread to other areas  · You and your healthcare provider will use the Byron score, T stage, and PSA results to talk about your treatment options  Your provider will tell you if your prostate cancer is at low, medium, or high risk for growing and spreading  The need for more tests and the range of treatment options depend on the risk level  Together you and your provider can create a treatment plan that is right for you  How clinically localized prostate cancer is treated, and the benefits of treatment:   · Watchful waiting  means you do not receive treatment right away  If the cancer does not grow or spread, you may never need treatment  Watchful waiting may be used if your tumor risk is low   The benefit of this option is that you will not have invasive or difficult treatment  You can choose a different treatment option if tests show the tumor is growing or spreading over time  · Active surveillance  also means you do not receive treatment right away  You will need to have tests over time to continue to check the tumor  A benefit of this option is that follow-up tests can show a change early  Treatment options may be less invasive than if the tumor is found at a later stage  · Cryoablation  is used to kill cancer cells by freezing them  This is a less invasive treatment  You may have little or no pain after this procedure  · Radiation  may be used to destroy the cancer cells  Radiation is about as effective as surgery to remove the prostate gland  This treatment leaves the prostate in place and only targets the cancer cells  · Surgery  is used to remove the prostate gland  The tumor is in the gland, so it will be removed along with the gland  · Hormone therapy  may be added to surgery or radiation treatment  Your testosterone level is lowered, or it is blocked  This can stop the cancer cells from growing, or slow the growth  Hormone therapy may be given as an injection every 1 to 6 months  Another way to lower your testosterone level is to have surgery to remove your testicles  Your body will no longer make testosterone if your testicles are removed  Risks of treatment:   · Watchful waiting and active surveillance  can cause you to worry that the cancer is growing or spreading  · Surgery  can damage tissue around your prostate  Surgery can increase your risk for trouble urinating, incontinence (leaking), or urinary retention  Surgery can also cause problems with your ability to have or keep an erection  You may bleed more than expected during surgery or develop an infection  You may also need to be treated again if the surgery you have does not relieve your symptoms  Surgery may not be able to remove all the tumor cells      · Radiation  may not kill all the cancer cells  Radiation can increase your risk for trouble urinating, incontinence (leaking), or urinary retention  You may have temporary or permanent hair loss in your scrotum  Your skin can become dry or irritated  You may develop problems urinating or having a bowel movement  Radiation can also cause problems with your ability to have or keep an erection  · Cryoablation  may not kill all the cancer cells  You may develop scar tissue  You can also have swelling, problems urinating, or pain in your pelvis or scrotum  Tissue outside the prostate may be damaged during cryoablation  You may have permanent erection problems  Questions to ask your healthcare provider to help you make decisions about treatment:   · What is my Mikado score, T score, and risk number? · How often will I need follow-up tests if I choose active surveillance first?    · How will each treatment option affect my daily activities? · What is the risk for erectile dysfunction or impotence with each treatment? · Am I a good candidate for surgery? · Which surgery may work best to treat my symptoms? · Where is the surgery done? · How long is recovery after surgery? · Will my insurance cover treatment? Questions to consider to help you make decisions about treatment:   · If my cancer risk is low, will I be comfortable with watchful waiting or active surveillance instead of immediate treatment? How will I feel if the cancer grows or spreads? · Will I go in for follow-up tests over time if I do not want treatment right away? · If I have treatment and lose my ability to have an erection, how will I feel? · Am I willing to accept problems with urinating or having a bowel movement that might happen with treatment? · How will my family or others in my life be affected by my treatment decisions?   © 2017 [employer] Information is for End User's use only and may not be sold, redistributed or otherwise used for commercial purposes  All illustrations and images included in CareNotes® are the copyrighted property of [employer]  or [employer]  The above information is an  only  It is not intended as medical advice for individual conditions or treatments  Talk to your doctor, nurse or pharmacist before following any medical regimen to see if it is safe and effective for you  3

## 2021-04-12 ENCOUNTER — IMMUNIZATIONS (OUTPATIENT)
Dept: FAMILY MEDICINE CLINIC | Facility: HOSPITAL | Age: 66
End: 2021-04-12

## 2021-04-12 DIAGNOSIS — Z23 ENCOUNTER FOR IMMUNIZATION: Primary | ICD-10-CM

## 2021-04-12 PROCEDURE — 0011A SARS-COV-2 / COVID-19 MRNA VACCINE (MODERNA) 100 MCG: CPT

## 2021-04-12 PROCEDURE — 91301 SARS-COV-2 / COVID-19 MRNA VACCINE (MODERNA) 100 MCG: CPT

## 2021-05-07 ENCOUNTER — OFFICE VISIT (OUTPATIENT)
Dept: UROLOGY | Facility: CLINIC | Age: 66
End: 2021-05-07
Payer: COMMERCIAL

## 2021-05-07 VITALS
WEIGHT: 207 LBS | HEART RATE: 60 BPM | RESPIRATION RATE: 20 BRPM | DIASTOLIC BLOOD PRESSURE: 70 MMHG | BODY MASS INDEX: 31.37 KG/M2 | SYSTOLIC BLOOD PRESSURE: 126 MMHG | HEIGHT: 68 IN

## 2021-05-07 DIAGNOSIS — N52.8 OTHER MALE ERECTILE DYSFUNCTION: ICD-10-CM

## 2021-05-07 DIAGNOSIS — N13.8 BENIGN PROSTATIC HYPERPLASIA WITH URINARY OBSTRUCTION: Primary | ICD-10-CM

## 2021-05-07 DIAGNOSIS — N40.1 BENIGN PROSTATIC HYPERPLASIA WITH URINARY OBSTRUCTION: Primary | ICD-10-CM

## 2021-05-07 DIAGNOSIS — C61 PROSTATE CANCER (HCC): ICD-10-CM

## 2021-05-07 PROCEDURE — 3008F BODY MASS INDEX DOCD: CPT | Performed by: UROLOGY

## 2021-05-07 PROCEDURE — 99214 OFFICE O/P EST MOD 30 MIN: CPT | Performed by: UROLOGY

## 2021-05-07 PROCEDURE — 1036F TOBACCO NON-USER: CPT | Performed by: UROLOGY

## 2021-05-07 RX ORDER — TADALAFIL 5 MG/1
5 TABLET ORAL DAILY PRN
Qty: 90 TABLET | Refills: 3 | Status: SHIPPED | OUTPATIENT
Start: 2021-05-07 | End: 2021-05-10 | Stop reason: CLARIF

## 2021-05-07 NOTE — PROGRESS NOTES
UROLOGY FOLLOWUP NOTE     CHIEF COMPLAINT   Lovely Vizcaino is a 60 y o  male with a complaint of   Chief Complaint   Patient presents with    Prostate Cancer    Elevated PSA     History of Present Illness:     60 y o  male with recent PSA check of 5 1  Patient has been undergoing health fair PSAs for the last handful of years  He has not had a digital rectal exam for the last 3 years  He is  and has no family history of prostate cancer  His father in law does have prostate cancer and has been treated with radiation and androgen deprivation therapy  He presents for discussion  He has some mild low level urinary urgency and hesitancy  He is not interested in treatment for this  He is very healthy and exercises and lift weights regularly  AUA SS 13 QoL 2    We continued to follow the patient's PSA after the initial visit and noted continued to rise  I discussed with the patient by phone my recommendation to proceed with a prostate biopsy and he agreed  He presents today for this procedure  Aspirin held  Patient works as a  and yesterday fell office 17 ft roof  He did break his radius and the med a carpal bone  He has some other scratches and bruises but is otherwise fine  PSA 5/1/21 - 7 55 ; %Free 15  PSA 8/21/20 - 7 09  PSA 11/29/19 - 4 9  PSA 6/2/19 - 5 7  PSA 12/7/18 - 6 2  PSA 11/30/18 - 4 8  PSA 3/17/2018 - 5 1  PSA 3/19/2017 - 3 9  PSA 3/20/2016 - 2 5  PSA 2015 - 3 7  PSA 2014 - 2 7  PSA 2013 - 2 5      Patient underwent multiparametric MRI and 1 year confirmatory biopsy in February of 2020  He underwent a perineal biopsy  This was benign  He returns for six-month interval PSA checked  There has been a slight elevation in his PSA  Repeat multiparametric MRI did not demonstrate concern  Patient returns at interval   His PSA has risen slightly however we know that his prostate measures around 75 g    He continues to note some mild weak stream and frequency and some changes to his erections  Past Medical History:     Past Medical History:   Diagnosis Date    [facility] [address] [facility]     Colon polyp     Hypertension     Prostate cancer ([facility] [address] [facility]        PAST SURGICAL HISTORY:     Past Surgical History:   Procedure Laterality Date    CARPAL TUNNEL RELEASE Bilateral     COLONOSCOPY      FRACTURE SURGERY Right     LEG SURGERY Right     tumor removal     PROSTATE BIOPSY  02/27/2019    PROSTATE BIOPSY N/A 2/21/2020    Procedure: TRANSRECTAL NEEDLE BIOPSY PROSTATE (TRNBP) TRANSPERINEAL APPROACH;  Surgeon: Love Solorzano MD;  [facility];  Service: Urology       CURRENT MEDICATIONS:     Current Outpatient Medications   Medication Sig Dispense Refill    Ascorbic Acid (VITAMIN C PO) Take by mouth daily      losartan-hydrochlorothiazide (HYZAAR) 50-12 5 mg per tablet Take 1 tablet by mouth daily in the early morning 90 tablet 3    MULTIPLE VITAMINS ESSENTIAL PO Take 1 tablet by mouth daily      NON FORMULARY 2 (two) times a day Super Beta Prostate supplement      tadalafil (CIALIS) 5 MG tablet Take 1 tablet (5 mg total) by mouth daily as needed for erectile dysfunction 90 tablet 3     No current facility-administered medications for this visit  ALLERGIES:   No Known Allergies    SOCIAL HISTORY:     Social History     Socioeconomic History    Marital status: /Civil Union     Spouse name: None    Number of children: None    Years of education: None    Highest education level: None   Occupational History    None   Social Needs    Financial resource strain: None    Food insecurity     Worry: None     Inability: None    Transportation needs     Medical: None     Non-medical: None   Tobacco Use    Smoking status: Never Smoker    Smokeless tobacco: Never Used   Substance and Sexual Activity    Alcohol use:  Yes     Alcohol/week: 3 0 standard drinks     Types: 3 Shots of liquor per week     Frequency: 2-3 times a week     Drinks per session: 1 or 2     Binge frequency: Never     Comment: socially    Drug use: Never    Sexual activity: None   Lifestyle    Physical activity     Days per week: None     Minutes per session: None    Stress: None   Relationships    Social connections     Talks on phone: None     Gets together: None     Attends [employer] service: None     Active member of club or organization: None     Attends meetings of clubs or organizations: None     Relationship status: None    Intimate partner violence     Fear of current or ex partner: None     Emotionally abused: None     Physically abused: None     Forced sexual activity: None   Other Topics Concern    None   Social History Narrative    Caffeine use    Dental care, regularly    Exercises 3 to 4 times per week    Lives independently with spouse    Sun protection sunscreen    Uses safety equipment-seatbelts           SOCIAL HISTORY:     Family History   Problem Relation Age of Onset    Glaucoma Mother     Hyperlipidemia Mother     Heart attack Father     Hyperlipidemia Father     Glaucoma Maternal Grandmother        REVIEW OF SYSTEMS:     Review of Systems   Constitutional: Negative  Respiratory: Negative  Cardiovascular: Negative  Gastrointestinal: Negative  Genitourinary: Positive for frequency and urgency  Musculoskeletal: Negative  Skin: Negative  Psychiatric/Behavioral: Negative  PHYSICAL EXAM:     /70   Pulse 60   Resp 20   Ht 5' 8" (1 727 m)   Wt 93 9 kg (207 lb)   BMI 31 47 kg/m²     General:  Healthy appearing male in no acute distress  They have a normal affect  There is not appear to be any gross neurologic defects or abnormalities  HEENT:  Normocephalic, atraumatic  Neck is supple without any palpable lymphadenopathy  Cardiovascular:  Patient has normal palpable distal radial pulses  There is no significant peripheral edema  No JVD is noted  Respiratory:  Patient has unlabored respirations    There is no audible wheeze or rhonchi  Abdomen:  Abdomen is   Without surgical scars  Abdomen is soft and nontender  There is no tympany  Inguinal and umbilical hernia are not appreciated  Genitourinary: no penile lesions or discharge, no testicular masses or tenderness, no hernias,  Wide-based and enlarged prostate greater than 60 g,  Smooth no nodules    Musculoskeletal:  Patient does not have significant CVA tenderness in the  flank with palpation or percussion  They full range of motion in all 4 extremities  Strength in all 4 extremities appears congruent  Patient is able to ambulate without assistance or difficulty  Dermatologic:  Patient has no skin abnormalities or rashes  LABS:     CBC:   Lab Results   Component Value Date    WBC 7 17 2019    HGB 14 6 2019    HCT 43 4 2019    MCV 90 2019     2019       BMP:   Lab Results   Component Value Date    CALCIUM 9 7 2020    K 4 4 2020    CO2 32 2020     2020    BUN 15 2020    CREATININE 1 28 2020     PSA 21 - 7 55 ; %Free 15  PSA 20 - 7 09  PSA 19 - 4 9  PSA 19 - 5 7  PSA 18 - 6 2  PSA 18 - 4 8  PSA 3/17/2018 - 5 1  PSA 3/19/2017 - 3 9  PSA 3/20/2016 - 2 5  PSA  - 3 7  PSA  - 2 7  PSA  - 2 5    IMAGIN/7/19  MULTIPARAMETRIC MRI OF THE PROSTATE WITH AND WITHOUT CONTRAST      INDICATION:   C61: Malignant neoplasm of prostate  On active surveillance      COMPARISON: None     PSA LEVEL: 6 2 ng/ml  2018  PRIOR BIOPSY DATE: 2019    BIOPSY RESULTS: Byron 6 prostate cancer involving the left lateral apex and right lateral mid gland      TECHNIQUE: The following pulse sequences were obtained:  T1w axial; T2w axial, sagittal and coronal; DWI axial and ADC map; T1w water, fat, in-phase and opposed-phase axials of entire pelvis and dynamic 3D T1w axial before and during IV contrast   injection         CONTRAST:  Gadobutrol (Gadavist) 10 mL of gadobutrol injection (MULTI-DOSE)     TECHNICAL LIMITATIONS: None      FINDINGS:     PROSTATE GLAND:  -VOLUME: 60 4 ml   -PERIPHERAL ZONE:  There is mild peripheral zone heterogeneity without focal suspicious lesion  -TRANSITION ZONE:  Multiple BPH nodules are identified  Shelvy Mingle -CENTRAL ZONE: Normal   -ANTERIOR FIBROMUSCULAR STROMA:  Within normal limits  Shelvy Mingle -"CAPSULE": Intact      SEMINAL VESICLES: Within normal limits        PELVIC CAVITY:  -LYMPH NODES: There is a borderline left external iliac lymph node measuring 9 mm though this appears to be mostly fatty replaced and likely benign  -BLADDER: Unremarkable  -ADDITIONAL FINDINGS: There is diverticulosis coli      OSSEOUS STRUCTURES:   Normal marrow signal  No evidence of bone metastases        IMPRESSION:     1  No suspicious lesion  Nodules enlarge prostate with BPH nodules and mild peripheral zone heterogeneity      2  Borderline left external iliac lymph node though mostly fatty replaced and likely benign      3  Diverticulosis coli      PI-RADS v2 Assessment Category: PI-RADS 2: Low (clinically significant cancer is unlikely to be present)  PATHOLOGY:     2/21/20  Final Diagnosis    A  Right base prostate core biopsies:  - Benign prostatic tissue  (confirmed via multiplex staining for , p63, and p504s)     B  Left posterolateral prostate core biopsies:  - Benign prostatic tissue       C  Left posterior medial prostate core biopsies:  - Benign prostatic tissue     D  Left anterior lateral prostate core biopsies:  - Benign prostatic tissue      E  Left anterior medial prostate core biopsies:  - Benign prostatic tissue     F  Left base prostate core biopsies:  - Benign prostatic tissue       G  Right posterior medial prostate core biopsies:  - Benign prostatic tissue     H  Right posterolateral prostate core biopsies:    - Benign prostatic tissue     I  Right anterior lateral prostate core biopsies:  - Benign prostatic tissue     J   Right anterior medial prostate core biopsies:    - Benign prostatic tissue       2/27/19 - Read by Dr Armin Guthrie in review  Final Diagnosis   A  Prostate, left lateral base, biopsy:     - No significant pathologic abnormalities  - No high grade prostatic intraepithelial neoplasia (HGPIN) or carcinoma identified      B  Prostate, left lateral mid, biopsy:     - No significant pathologic abnormalities  - No high grade prostatic intraepithelial neoplasia (HGPIN) or carcinoma identified      C  Prostate, left lateral apex, biopsy:     - Small focus of prostatic adenocarcinoma, francisco j score 3+3=6 (grade Group 1), involving less than 5% of        one (1) core  See note      D  Prostate, left medial base, biopsy:     - No significant pathologic abnormalities  - No high grade prostatic intraepithelial neoplasia (HGPIN) or carcinoma identified      E  Prostate, left medial mid, biopsy:     - No significant pathologic abnormalities  - No high grade prostatic intraepithelial neoplasia (HGPIN) or carcinoma identified      F  Prostate, left medial apex, biopsy:     - No significant pathologic abnormalities  - No high grade prostatic intraepithelial neoplasia (HGPIN) or carcinoma identified      G  Prostate, right lateral base, biopsy:     - No significant pathologic abnormalities  - No high grade prostatic intraepithelial neoplasia (HGPIN) or carcinoma identified      H  Prostate, right lateral mid, biopsy:     - Prostatic adenocarcinoma, [place] score 3+3+6 (Grade Group 1)Discontinuously involving 10% of        one (1) core  See note      I  Prostate, right lateral apex, biopsy:     - No significant pathologic abnormalities  - No high grade prostatic intraepithelial neoplasia (HGPIN) or carcinoma identified      J   Prostate, right medial base, biopsy:     - No significant pathologic abnormalities  - No high grade prostatic intraepithelial neoplasia (HGPIN) or carcinoma identified      K  Prostate, right medial mid, biopsy:     - No significant pathologic abnormalities  - No high grade prostatic intraepithelial neoplasia (HGPIN) or carcinoma identified      L  Prostate, right medial apex, biopsy     - No significant pathologic abnormalities  - No high grade prostatic intraepithelial neoplasia (HGPIN) or carcinoma identified  ASSESSMENT:     60 y o  male with low volume very low risk cT1c adenocarcinoma of the prostate, now s/p mpMRI and confirmatory biopsy    PLAN:       I reassured the patient about his PSA  We know this is likely related to prostate size changes  There was a 15 point change in his prostate size between his 2 MRIs  I am comfortable with PSA density at current  Will recommend PSA check at the 1 year lizzie from his last study in February of 2022  did recommend that the patient trial Cialis 5 mg daily  He has some BPH with obstruction signs, some mild weak stream and frequency  In addition, the patient is dealing with erectile dysfunction  This is the only medication that will benefit both issues and it is my strongest recommendation  If there is cost constraints, we can discuss compounding pharmacies that can  Get the medication for the patient more cost effective way

## 2021-05-07 NOTE — PATIENT INSTRUCTIONS
Tadalafil (By mouth)   Tadalafil ([id number])  Treats erectile dysfunction and the symptoms of benign prostatic hyperplasia (enlarged prostate)  Also treats pulmonary arterial hypertension (high blood pressure in the lungs) to improve your exercise ability  Brand Name(s): Adcirca, Alyq, Cialis   There may be other brand names for this medicine  When This Medicine Should Not Be Used: This medicine is not right for everyone  Do not use it if you had an allergic reaction to tadalafil  How to Use This Medicine:   Tablet  · Take your medicine as directed  Your dose may need to be changed several times to find what works best for you  · Swallow the tablet whole  Do not split, break, chew, or crush it  · Use only the brand of medicine your doctor prescribed  Other brands may not work the same way  · Read and follow the patient instructions that come with this medicine  Talk to your doctor or pharmacist if you have any questions  · Missed dose: Take a dose as soon as you remember  If it is almost time for your next dose, wait until then and take a regular dose  Do not take extra medicine to make up for a missed dose  · Store the medicine in a closed container at room temperature, away from heat, moisture, and direct light  Drugs and Foods to Avoid:   Ask your doctor or pharmacist before using any other medicine, including over-the-counter medicines, vitamins, and herbal products  · Do not use this medicine if you are also taking riociguat or a nitrate medicine  You may use a nitrate medicine at least 48 hours after your last dose of tadalafil  Do not take other medicines that contain tadalafil or similar medicines, including sildenafil or vardenafil  · Some foods and medicines can affect how tadalafil works  Tell your doctor if you are using any of the following:  ? Bosentan, carbamazepine, erythromycin, indinavir, itraconazole, ketoconazole, phenobarbital, phenytoin, rifampin, ritonavir  ?  Blood pressure medicine (including alfuzosin, amlodipine, bendroflumethiazide, candesartan, doxazosin, enalapril, losartan, metoprolol, tamsulosin, terazosin)  ? Medicine to treat prostate problems  · Do not have 5 or more alcohol-containing drinks in a short period of time while you are using this medicine  · Do not eat grapefruit or drink grapefruit juice while you are using this medicine  Warnings While Using This Medicine:   · Tell your doctor if you are pregnant or breastfeeding, or if you have kidney disease, liver disease, lung or breathing problems, diabetes, high cholesterol, cancer (including leukemia, multiple myeloma), sickle cell anemia, bleeding problems, stomach ulcer, problems with your penis, or eye problems  Tell your doctor if you have angina or chest pain during sex, heart disease, heart rhythm problems, high or low blood pressure, or a history of heart attack or stroke  Also tell your doctor if you smoke or drink alcohol  · Tell any doctor who treats you that you are using tadalafil  · This medicine may cause the following problems:   ? Low blood pressure (especially if taken with other medicines that lower blood pressure)  ? Painful or prolonged erection  ? Hearing or vision problems  · Your doctor will do lab tests at regular visits to check on the effects of this medicine  Keep all appointments  · Keep all medicine out of the reach of children  Never share your medicine with anyone    Possible Side Effects While Using This Medicine:   Call your doctor right away if you notice any of these side effects:  · Allergic reaction: Itching or hives, swelling in your face or hands, swelling or tingling in your mouth or throat, chest tightness, trouble breathing  · Blistering, peeling, or red skin rash  · Chest pain, trouble breathing  · Lightheadedness, fainting  · Painful erection or an erection that lasts longer than 4 hours with or without pain  · Sudden decrease in hearing or hearing loss, ringing in the ears, dizziness  · Sudden loss of vision  If you notice these less serious side effects, talk with your doctor:   · Back or muscle pain  · Headache  · Stuffy or runny nose  · Upset stomach, nausea  · Warmth or redness in your face, neck, arms, or upper chest  If you notice other side effects that you think are caused by this medicine, tell your doctor  Call your doctor for medical advice about side effects  You may report side effects to FDA at 6-387-WUQ-4575  © Copyright [address] 2021 Information is for End User's use only and may not be sold, redistributed or otherwise used for commercial purposes  The above information is an  only  It is not intended as medical advice for individual conditions or treatments  Talk to your doctor, nurse or pharmacist before following any medical regimen to see if it is safe and effective for you

## 2021-05-10 DIAGNOSIS — N13.8 BENIGN PROSTATIC HYPERPLASIA WITH URINARY OBSTRUCTION: ICD-10-CM

## 2021-05-10 DIAGNOSIS — N40.1 BENIGN PROSTATIC HYPERPLASIA WITH URINARY OBSTRUCTION: ICD-10-CM

## 2021-05-10 DIAGNOSIS — N52.8 OTHER MALE ERECTILE DYSFUNCTION: ICD-10-CM

## 2021-05-10 RX ORDER — TADALAFIL 5 MG/1
5 TABLET ORAL DAILY
Qty: 90 TABLET | Refills: 3 | Status: SHIPPED | OUTPATIENT
Start: 2021-05-10 | End: 2022-02-10 | Stop reason: SDUPTHER

## 2021-05-10 NOTE — TELEPHONE ENCOUNTER
I reached out to the patient to discuss cost-effective pricing and various pharmacy vendors  Karrie recommended for best cash pay pricing  Cost of #90 count supply of Tadalafil 5mg to [facility] is $35 65  Karrie coupon was included on the prescription       Script for the requested medication was queued and forwarded to the Advanced Practitioner covering the [address] for approval

## 2021-05-10 NOTE — TELEPHONE ENCOUNTER
Patient managed by Nimesh Dc is calling to say he had insurance change and wants the medications to go to [facility] in [place] not [facility] anymore  He would like tadalifil sent to [facility] for 30 days with refills because it is cheaper

## 2021-05-11 ENCOUNTER — IMMUNIZATIONS (OUTPATIENT)
Dept: FAMILY MEDICINE CLINIC | Facility: HOSPITAL | Age: 66
End: 2021-05-11

## 2021-05-11 DIAGNOSIS — Z23 ENCOUNTER FOR IMMUNIZATION: Primary | ICD-10-CM

## 2021-05-11 PROCEDURE — 0012A SARS-COV-2 / COVID-19 MRNA VACCINE (MODERNA) 100 MCG: CPT

## 2021-05-11 PROCEDURE — 91301 SARS-COV-2 / COVID-19 MRNA VACCINE (MODERNA) 100 MCG: CPT

## 2021-09-01 ENCOUNTER — OFFICE VISIT (OUTPATIENT)
Dept: URGENT CARE | Facility: CLINIC | Age: 66
End: 2021-09-01
Payer: COMMERCIAL

## 2021-09-01 VITALS
HEART RATE: 87 BPM | BODY MASS INDEX: 31.37 KG/M2 | RESPIRATION RATE: 18 BRPM | DIASTOLIC BLOOD PRESSURE: 80 MMHG | SYSTOLIC BLOOD PRESSURE: 140 MMHG | HEIGHT: 68 IN | OXYGEN SATURATION: 98 % | WEIGHT: 207 LBS | TEMPERATURE: 98.1 F

## 2021-09-01 DIAGNOSIS — L03.114 CELLULITIS OF LEFT HAND: Primary | ICD-10-CM

## 2021-09-01 DIAGNOSIS — M79.89 SWELLING OF LEFT HAND: ICD-10-CM

## 2021-09-01 DIAGNOSIS — T63.441A BEE STING REACTION, ACCIDENTAL OR UNINTENTIONAL, INITIAL ENCOUNTER: ICD-10-CM

## 2021-09-01 PROCEDURE — S9088 SERVICES PROVIDED IN URGENT: HCPCS | Performed by: NURSE PRACTITIONER

## 2021-09-01 PROCEDURE — 96372 THER/PROPH/DIAG INJ SC/IM: CPT | Performed by: NURSE PRACTITIONER

## 2021-09-01 PROCEDURE — 99214 OFFICE O/P EST MOD 30 MIN: CPT | Performed by: NURSE PRACTITIONER

## 2021-09-01 RX ORDER — METHYLPREDNISOLONE SODIUM SUCCINATE 40 MG/ML
80 INJECTION, POWDER, LYOPHILIZED, FOR SOLUTION INTRAMUSCULAR; INTRAVENOUS ONCE
Status: COMPLETED | OUTPATIENT
Start: 2021-09-01 | End: 2021-09-01

## 2021-09-01 RX ORDER — OXYCODONE HYDROCHLORIDE 5 MG/1
TABLET ORAL
COMMUNITY
Start: 2021-06-16 | End: 2021-12-21 | Stop reason: ALTCHOICE

## 2021-09-01 RX ORDER — PREDNISONE 10 MG/1
TABLET ORAL
Qty: 24 TABLET | Refills: 0 | Status: SHIPPED | OUTPATIENT
Start: 2021-09-02

## 2021-09-01 RX ORDER — CEPHALEXIN 500 MG/1
500 CAPSULE ORAL EVERY 12 HOURS SCHEDULED
Qty: 14 CAPSULE | Refills: 0 | Status: SHIPPED | OUTPATIENT
Start: 2021-09-01 | End: 2021-09-08

## 2021-09-01 RX ADMIN — METHYLPREDNISOLONE SODIUM SUCCINATE 80 MG: 40 INJECTION, POWDER, LYOPHILIZED, FOR SOLUTION INTRAMUSCULAR; INTRAVENOUS at 09:17

## 2021-09-01 NOTE — PATIENT INSTRUCTIONS
You have been prescribed prednisone to start 9/2  You also have been prescribed cephalexin - take all as directed  Elevate, apply cool compresses or ice  Go to the ED if symptoms worsen  Follow up with your PCP    Insect Bite or Sting   WHAT YOU NEED TO KNOW:   Most insect bites and stings are not dangerous and go away without treatment  Your symptoms may be mild, or you may develop anaphylaxis  Anaphylaxis is a sudden, life-threatening reaction that needs immediate treatment  Common examples of insects that bite or sting are bees, ticks, mosquitoes, spiders, and ants  Insect bites or stings can lead to diseases such as malaria, West Nile virus, Lyme disease, or [place] Spotted Fever  DISCHARGE INSTRUCTIONS:   Call your local emergency number (911 in the [address] [facility]) for signs or symptoms of anaphylaxis,  such as trouble breathing, swelling in your mouth or throat, or wheezing  You may also have itching, a rash, hives, or feel like you are going to faint  Return to the emergency department if:   · You are stung on your tongue or in your throat  · A white area forms around the bite  · You are sweating badly or have body pain  · You think you were bitten or stung by a poisonous insect  Call your doctor if:   · You have a fever  · The area becomes red, warm, tender, and swollen beyond the area of the bite or sting  · You have questions or concerns about your condition or care  Medicines: You may need any of the following:  · Antihistamines  decrease itching and rash  · Epinephrine  is used to treat severe allergic reactions such as anaphylaxis  · Take your medicine as directed  Contact your healthcare provider if you think your medicine is not helping or if you have side effects  Tell him of her if you are allergic to any medicine  Keep a list of the medicines, vitamins, and herbs you take  Include the amounts, and when and why you take them   Bring the list or the pill bottles to follow-up visits  Carry your medicine list with you in case of an emergency  Steps to take for signs or symptoms of anaphylaxis:   · Immediately  give 1 shot of epinephrine only into the outer thigh muscle  · Leave the shot in place  as directed  Your healthcare provider may recommend you leave it in place for up to 10 seconds before you remove it  This helps make sure all of the epinephrine is delivered  · Call 911 and go to the emergency department,  even if the shot improved symptoms  Do not drive yourself  Bring the used epinephrine shot with you  Safety precautions to take if you are at risk for anaphylaxis:   · Keep 2 shots of epinephrine with you at all times  You may need a second shot, because epinephrine only works for about 20 minutes and symptoms may return  Your healthcare provider can show you and family members how to give the shot  Check the expiration date every month and replace it before it expires  · Create an action plan  Your healthcare provider can help you create a written plan that explains the allergy and an emergency plan to treat a reaction  The plan explains when to give a second epinephrine shot if symptoms return or do not improve after the first  Give copies of the action plan and emergency instructions to family members, work and school staff, and  providers  Show them how to give a shot of epinephrine  · Carry medical alert identification  Wear medical alert jewelry or carry a card that says you have an insect allergy  Ask your healthcare provider where to get these items  If an insect bites or stings you:   · Remove the stinger  Scrape the stinger out with your fingernail, edge of a credit card, or a knife blade  Do not squeeze the wound  Gently wash the area with soap and water  · Remove the tick  Ticks must be removed as soon as possible so you do not get diseases passed through tick bites   Ask your healthcare provider for more information on tick bites and how to remove ticks  Care for a bite or sting wound:   · Elevate (raise) the area above the level of your heart, if possible  Prop the area on pillows to keep it raised comfortably  Elevate the area for 10 to 20 minutes each hour or as directed by your healthcare provider  · Use compresses  Soak a clean washcloth in cold water, wring it out, and put it on the bite or sting  Use the compress for 10 to 20 minutes each hour or as directed by your healthcare provider  After 24 to 48 hours, change to warm compresses  · Apply a paste  Add water to baking soda to make a thick paste  Put the paste on the area for 5 minutes  Rinse gently to remove the paste  Prevent another insect bite or sting:   · Do not wear bright-colored or flower-print clothing when you plan to spend time outdoors  Do not use hairspray, perfumes, or aftershave  · Do not leave food out  · Empty any standing water and wash container with soap and water every 2 days  · Put screens on all open windows and doors  · Put insect repellent that contains DEET on skin that is showing when you go outside  Put insect repellent at the top of your boots, bottom of pant legs, and sleeve cuffs  Wear long sleeves, pants, and shoes  · Use citronella candles outdoors to help keep mosquitoes away  Put a tick and flea collar on pets  Follow up with your doctor as directed:  Write down your questions so you remember to ask them during your visits  © Copyright [address] 2021 Information is for End User's use only and may not be sold, redistributed or otherwise used for commercial purposes  All illustrations and images included in CareNotes® are the copyrighted property of [employer]  or [facility] Anshu [facility]   The above information is an  only  It is not intended as medical advice for individual conditions or treatments   Talk to your doctor, nurse or pharmacist before following any medical regimen to see if it is safe and effective for you  Cellulitis   WHAT YOU NEED TO KNOW:   Cellulitis is a skin infection caused by bacteria  Cellulitis is common and can become severe  Cellulitis usually appears on the lower legs  It can also appear on the arms, face, and other areas  Cellulitis develops when bacteria enter a crack or break in your skin, such as a scratch, bite, or cut  DISCHARGE INSTRUCTIONS:   Return to the emergency department if:   · Your wound gets larger and more painful  · You feel a crackling under your skin when you touch it  · You have purple dots or bumps on your skin, or you see bleeding under your skin  · You see red streaks coming from the infected area  Call your doctor if:   · The red, warm, swollen area gets larger  · Your fever or pain does not go away or gets worse  · The area does not get smaller after 3 days of antibiotics  · You have questions or concerns about your condition or care  Medicines: You should start to see improvement in 3 days  If cellulitis is not treated, the infection can spread through your body and become life-threatening  You may need any of the following medicines:  · Antibiotics  help treat the bacterial infection  · Acetaminophen  decreases pain and fever  It is available without a doctor's order  Ask how much to take and how often to take it  Follow directions  Read the labels of all other medicines you are using to see if they also contain acetaminophen, or ask your doctor or pharmacist  Acetaminophen can cause liver damage if not taken correctly  Do not use more than 4 grams (4,000 milligrams) total of acetaminophen in one day  · NSAIDs , such as ibuprofen, help decrease swelling, pain, and fever  This medicine is available with or without a doctor's order  NSAIDs can cause stomach bleeding or kidney problems in certain people   If you take blood thinner medicine, always ask your healthcare provider if NSAIDs are safe for you  Always read the medicine label and follow directions  · Take your medicine as directed  Contact your healthcare provider if you think your medicine is not helping or if you have side effects  Tell him or her if you are allergic to any medicine  Keep a list of the medicines, vitamins, and herbs you take  Include the amounts, and when and why you take them  Bring the list or the pill bottles to follow-up visits  Carry your medicine list with you in case of an emergency  Self-care:   · Wash the area with soap and water every day  Gently pat dry  Use bandages if directed by your healthcare provider  · Elevate the area above the level of your heart  as often as you can  This will help decrease swelling and pain  Prop the area on pillows or blankets to keep it elevated comfortably  · Place a cool, damp cloth on the area  Use clean cloths and clean water  You can do this as often as you need to  Cool, damp cloths may help decrease pain  · Apply cream or ointment as directed  These help protect the area  Most over-the-counter products, such as petroleum jelly, are good to use  Ask your healthcare provider about specific creams or ointments you should use  Prevent cellulitis:   · Do not scratch bug bites or areas of injury  You increase your risk for cellulitis by scratching these areas  · Do not share personal items, such as towels, clothing, and razors  · Clean exercise equipment  with germ-killing  before and after you use it  · Treat athlete's foot  This can help prevent the spread of a bacterial skin infection  · Wash your hands often  Use soap and water  Wash your hands after you use the bathroom, change a child's diapers, or sneeze  Wash your hands before you prepare or eat food  Use lotion to prevent dry, cracked skin  Follow up with your doctor within 3 days, or as directed:  He or she will check if your cellulitis is getting better   Write down your questions so you remember to ask them during your visits  © Copyright [employer] 2021 Information is for End User's use only and may not be sold, redistributed or otherwise used for commercial purposes  All illustrations and images included in CareNotes® are the copyrighted property of A D A M , Inc  or Renita Cui  The above information is an  only  It is not intended as medical advice for individual conditions or treatments  Talk to your doctor, nurse or pharmacist before following any medical regimen to see if it is safe and effective for you

## 2021-09-01 NOTE — PROGRESS NOTES
[address] [facility] [facility]        NAME: Cuca Hedrick is a 60 y o  male  : 1955    MRN: [id number]  DATE: 2021  TIME: 9:55 AM    Assessment and Plan   Cellulitis of left hand [L03 114]  1  Cellulitis of left hand  methylPREDNISolone sodium succinate (Solu-MEDROL) injection 80 mg    predniSONE 10 mg tablet    cephalexin (KEFLEX) 500 mg capsule   2  Swelling of left hand  methylPREDNISolone sodium succinate (Solu-MEDROL) injection 80 mg    predniSONE 10 mg tablet    cephalexin (KEFLEX) 500 mg capsule   3  Bee sting reaction, accidental or unintentional, initial encounter  methylPREDNISolone sodium succinate (Solu-MEDROL) injection 80 mg    predniSONE 10 mg tablet    cephalexin (KEFLEX) 500 mg capsule         Patient Instructions       Follow up with PCP in 3-5 days  Proceed to  ER if symptoms worsen  You have been prescribed prednisone to start   You also have been prescribed cephalexin - take all as directed  Elevate, apply cool compresses or ice  Go to the ED if symptoms worsen  Follow up with your PCP                  Chief Complaint     Chief Complaint   Patient presents with    Bee Sting     left hand         History of Present Illness       This is a 60year old male who states that he was working in the yard yesterday and was stung by a bee on the left hand  He states he has swelling  He states his ring is tight but he moves it to make sure it does not need removed - refuses to have cut off  Denies difficulty breathing, swallowing  Review of Systems   Review of Systems   Constitutional: Negative  HENT: Negative  Eyes: Negative  Respiratory: Negative  Cardiovascular: Negative  Gastrointestinal: Negative  Endocrine: Negative  Genitourinary: Negative  Musculoskeletal: Negative  Skin:        Left hand swollen and red    Allergic/Immunologic: Negative  Neurological: Negative  Hematological: Negative  Psychiatric/Behavioral: Negative  Current Medications       Current Outpatient Medications:     Ascorbic Acid (VITAMIN C PO), Take by mouth daily, Disp: , Rfl:     MULTIPLE VITAMINS ESSENTIAL PO, Take 1 tablet by mouth daily, Disp: , Rfl:     NON FORMULARY, 2 (two) times a day Super Beta Prostate supplement, Disp: , Rfl:     tadalafil (CIALIS) 5 MG tablet, Take 1 tablet (5 mg total) by mouth daily, Disp: 90 tablet, Rfl: 3    cephalexin (KEFLEX) 500 mg capsule, Take 1 capsule (500 mg total) by mouth every 12 (twelve) hours for 7 days, Disp: 14 capsule, Rfl: 0    losartan-hydrochlorothiazide (HYZAAR) 50-12 5 mg per tablet, Take 1 tablet by mouth daily in the early morning, Disp: 90 tablet, Rfl: 3    oxyCODONE (ROXICODONE) 5 mg immediate release tablet, FOR MODERATE TO SEVERE PAIN TAKE 1 TABLET AS NEEDED EVERY 4 HOURS DAY 1 6 HOURS DAY 2 12 HOURS DAY 3 (Patient not taking: Reported on 9/1/2021), Disp: , Rfl:     [START ON 9/2/2021] predniSONE 10 mg tablet, Take 5 tabs po x 2 days; 4 tabs po x 2 days; 3 tabs po x 1 day; 2 tabs po x 1 day  1 tab po x 1 day , Disp: 24 tablet, Rfl: 0  No current facility-administered medications for this visit      Current Allergies     Allergies as of 09/01/2021    (No Known Allergies)            The following portions of the patient's history were reviewed and updated as appropriate: allergies, current medications, past family history, past medical history, past social history, past surgical history and problem list      Past Medical History:   Diagnosis Date    Cancer ([facility] [address] )     Colon polyp     Hypertension     Prostate cancer [facility])        Past Surgical History:   Procedure Laterality Date    CARPAL TUNNEL RELEASE Bilateral     COLONOSCOPY      FRACTURE SURGERY Right     LEG SURGERY Right     tumor removal     PROSTATE BIOPSY  02/27/2019    PROSTATE BIOPSY N/A 2/21/2020    Procedure: TRANSRECTAL NEEDLE BIOPSY PROSTATE (TRNBP) TRANSPERINEAL APPROACH;  Surgeon: Sarmad Dhillon MD;  Location: [facility];  Service: Urology       Family History   Problem Relation Age of Onset   Bailee Hensley Glaucoma Mother     Hyperlipidemia Mother     Heart attack Father     Hyperlipidemia Father     Glaucoma Maternal Grandmother          Medications have been verified  Objective   /80   Pulse 87   Temp 98 1 °F (36 7 °C)   Resp 18   Ht 5' 8" (1 727 m)   Wt 93 9 kg (207 lb)   SpO2 98%   BMI 31 47 kg/m²   No LMP for male patient  Physical Exam     Physical Exam  Vitals and nursing note reviewed  Constitutional:       General: He is not in acute distress  Appearance: Normal appearance  He is obese  He is not ill-appearing, toxic-appearing or diaphoretic  HENT:      Head: Normocephalic and atraumatic  Mouth/Throat:      Mouth: Mucous membranes are moist    Eyes:      Extraocular Movements: Extraocular movements intact  Cardiovascular:      Rate and Rhythm: Normal rate  Pulmonary:      Effort: Pulmonary effort is normal    Musculoskeletal:         General: Normal range of motion  Cervical back: Normal range of motion  Skin:     General: Skin is warm  Capillary Refill: Capillary refill takes less than 2 seconds  Findings: Erythema present  Comments: Left hand, fingers are swollen, red from finger tips to wrist    Able to make a fist   Skin is blanchable but swollen  Skin is compressible   + radial pulse  Ring is tight on left 4th ringer finger but able to spin on finger    Neurological:      General: No focal deficit present  Mental Status: He is alert and oriented to person, place, and time  Psychiatric:         Mood and Affect: Mood normal          Behavior: Behavior normal          Thought Content:  Thought content normal          Judgment: Judgment normal

## 2021-11-19 ENCOUNTER — TELEPHONE (OUTPATIENT)
Dept: FAMILY MEDICINE CLINIC | Facility: CLINIC | Age: 66
End: 2021-11-19

## 2021-11-19 DIAGNOSIS — Z20.822 EXPOSURE TO COVID-19 VIRUS: Primary | ICD-10-CM

## 2021-11-19 PROCEDURE — U0005 INFEC AGEN DETEC AMPLI PROBE: HCPCS | Performed by: INTERNAL MEDICINE

## 2021-11-19 PROCEDURE — U0003 INFECTIOUS AGENT DETECTION BY NUCLEIC ACID (DNA OR RNA); SEVERE ACUTE RESPIRATORY SYNDROME CORONAVIRUS 2 (SARS-COV-2) (CORONAVIRUS DISEASE [COVID-19]), AMPLIFIED PROBE TECHNIQUE, MAKING USE OF HIGH THROUGHPUT TECHNOLOGIES AS DESCRIBED BY CMS-2020-01-R: HCPCS | Performed by: INTERNAL MEDICINE

## 2021-12-21 ENCOUNTER — OFFICE VISIT (OUTPATIENT)
Dept: FAMILY MEDICINE CLINIC | Facility: CLINIC | Age: 66
End: 2021-12-21
Payer: MEDICARE

## 2021-12-21 VITALS
SYSTOLIC BLOOD PRESSURE: 132 MMHG | DIASTOLIC BLOOD PRESSURE: 70 MMHG | HEIGHT: 68 IN | WEIGHT: 209 LBS | BODY MASS INDEX: 31.67 KG/M2 | RESPIRATION RATE: 18 BRPM | HEART RATE: 78 BPM

## 2021-12-21 DIAGNOSIS — N18.31 STAGE 3A CHRONIC KIDNEY DISEASE (HCC): ICD-10-CM

## 2021-12-21 DIAGNOSIS — Z00.00 WELCOME TO MEDICARE PREVENTIVE VISIT: Primary | ICD-10-CM

## 2021-12-21 DIAGNOSIS — F11.20 CONTINUOUS OPIOID DEPENDENCE (HCC): ICD-10-CM

## 2021-12-21 DIAGNOSIS — C61 PROSTATE CANCER (HCC): ICD-10-CM

## 2021-12-21 DIAGNOSIS — Z23 ENCOUNTER FOR IMMUNIZATION: ICD-10-CM

## 2021-12-21 DIAGNOSIS — E78.2 MIXED HYPERLIPIDEMIA: ICD-10-CM

## 2021-12-21 DIAGNOSIS — Z11.59 ENCOUNTER FOR HEPATITIS C SCREENING TEST FOR LOW RISK PATIENT: ICD-10-CM

## 2021-12-21 DIAGNOSIS — I10 HYPERTENSION, UNSPECIFIED TYPE: ICD-10-CM

## 2021-12-21 PROCEDURE — 90662 IIV NO PRSV INCREASED AG IM: CPT | Performed by: INTERNAL MEDICINE

## 2021-12-21 PROCEDURE — G0438 PPPS, INITIAL VISIT: HCPCS | Performed by: INTERNAL MEDICINE

## 2021-12-21 PROCEDURE — G0008 ADMIN INFLUENZA VIRUS VAC: HCPCS | Performed by: INTERNAL MEDICINE

## 2021-12-21 PROCEDURE — 1123F ACP DISCUSS/DSCN MKR DOCD: CPT | Performed by: INTERNAL MEDICINE

## 2021-12-21 RX ORDER — DIPHENOXYLATE HYDROCHLORIDE AND ATROPINE SULFATE 2.5; .025 MG/1; MG/1
1 TABLET ORAL EVERY MORNING
COMMUNITY

## 2021-12-21 RX ORDER — LOSARTAN POTASSIUM AND HYDROCHLOROTHIAZIDE 12.5; 5 MG/1; MG/1
1 TABLET ORAL
Qty: 90 TABLET | Refills: 3 | Status: SHIPPED | OUTPATIENT
Start: 2021-12-21 | End: 2022-12-16

## 2021-12-21 RX ORDER — LOSARTAN POTASSIUM AND HYDROCHLOROTHIAZIDE 12.5; 5 MG/1; MG/1
1 TABLET ORAL
Qty: 90 TABLET | Refills: 3 | Status: SHIPPED | OUTPATIENT
Start: 2021-12-21 | End: 2021-12-21 | Stop reason: SDUPTHER

## 2022-02-04 ENCOUNTER — APPOINTMENT (OUTPATIENT)
Dept: LAB | Facility: CLINIC | Age: 67
End: 2022-02-04
Payer: MEDICARE

## 2022-02-04 DIAGNOSIS — I10 HYPERTENSION, UNSPECIFIED TYPE: ICD-10-CM

## 2022-02-04 DIAGNOSIS — E78.2 MIXED HYPERLIPIDEMIA: ICD-10-CM

## 2022-02-04 DIAGNOSIS — C61 PROSTATE CANCER (HCC): ICD-10-CM

## 2022-02-04 DIAGNOSIS — Z11.59 ENCOUNTER FOR HEPATITIS C SCREENING TEST FOR LOW RISK PATIENT: ICD-10-CM

## 2022-02-04 LAB
ALBUMIN SERPL BCP-MCNC: 4.1 G/DL (ref 3.5–5)
ALP SERPL-CCNC: 73 U/L (ref 46–116)
ALT SERPL W P-5'-P-CCNC: 40 U/L (ref 12–78)
ANION GAP SERPL CALCULATED.3IONS-SCNC: 3 MMOL/L (ref 4–13)
AST SERPL W P-5'-P-CCNC: 26 U/L (ref 5–45)
BASOPHILS # BLD AUTO: 0.05 THOUSANDS/ΜL (ref 0–0.1)
BASOPHILS NFR BLD AUTO: 1 % (ref 0–1)
BILIRUB SERPL-MCNC: 1.03 MG/DL (ref 0.2–1)
BUN SERPL-MCNC: 23 MG/DL (ref 5–25)
CALCIUM SERPL-MCNC: 9.8 MG/DL (ref 8.3–10.1)
CHLORIDE SERPL-SCNC: 105 MMOL/L (ref 100–108)
CHOLEST SERPL-MCNC: 216 MG/DL
CO2 SERPL-SCNC: 29 MMOL/L (ref 21–32)
CREAT SERPL-MCNC: 1.27 MG/DL (ref 0.6–1.3)
EOSINOPHIL # BLD AUTO: 0.13 THOUSAND/ΜL (ref 0–0.61)
EOSINOPHIL NFR BLD AUTO: 2 % (ref 0–6)
ERYTHROCYTE [DISTWIDTH] IN BLOOD BY AUTOMATED COUNT: 11.9 % (ref 11.6–15.1)
GFR SERPL CREATININE-BSD FRML MDRD: 58 ML/MIN/1.73SQ M
GLUCOSE P FAST SERPL-MCNC: 85 MG/DL (ref 65–99)
HCT VFR BLD AUTO: 47 % (ref 36.5–49.3)
HCV AB SER QL: NORMAL
HDLC SERPL-MCNC: 54 MG/DL
HGB BLD-MCNC: 15.7 G/DL (ref 12–17)
IMM GRANULOCYTES # BLD AUTO: 0.02 THOUSAND/UL (ref 0–0.2)
IMM GRANULOCYTES NFR BLD AUTO: 0 % (ref 0–2)
LDLC SERPL CALC-MCNC: 141 MG/DL (ref 0–100)
LYMPHOCYTES # BLD AUTO: 2.05 THOUSANDS/ΜL (ref 0.6–4.47)
LYMPHOCYTES NFR BLD AUTO: 33 % (ref 14–44)
MCH RBC QN AUTO: 30.4 PG (ref 26.8–34.3)
MCHC RBC AUTO-ENTMCNC: 33.4 G/DL (ref 31.4–37.4)
MCV RBC AUTO: 91 FL (ref 82–98)
MONOCYTES # BLD AUTO: 0.67 THOUSAND/ΜL (ref 0.17–1.22)
MONOCYTES NFR BLD AUTO: 11 % (ref 4–12)
NEUTROPHILS # BLD AUTO: 3.28 THOUSANDS/ΜL (ref 1.85–7.62)
NEUTS SEG NFR BLD AUTO: 53 % (ref 43–75)
NONHDLC SERPL-MCNC: 162 MG/DL
NRBC BLD AUTO-RTO: 0 /100 WBCS
PLATELET # BLD AUTO: 225 THOUSANDS/UL (ref 149–390)
PMV BLD AUTO: 9.3 FL (ref 8.9–12.7)
POTASSIUM SERPL-SCNC: 4.3 MMOL/L (ref 3.5–5.3)
PROT SERPL-MCNC: 7.6 G/DL (ref 6.4–8.2)
PSA SERPL-MCNC: 10.8 NG/ML (ref 0–4)
RBC # BLD AUTO: 5.16 MILLION/UL (ref 3.88–5.62)
SODIUM SERPL-SCNC: 137 MMOL/L (ref 136–145)
TRIGL SERPL-MCNC: 105 MG/DL
WBC # BLD AUTO: 6.2 THOUSAND/UL (ref 4.31–10.16)

## 2022-02-04 PROCEDURE — 80053 COMPREHEN METABOLIC PANEL: CPT

## 2022-02-04 PROCEDURE — 86803 HEPATITIS C AB TEST: CPT

## 2022-02-04 PROCEDURE — 85025 COMPLETE CBC W/AUTO DIFF WBC: CPT

## 2022-02-04 PROCEDURE — 80061 LIPID PANEL: CPT

## 2022-02-04 PROCEDURE — 36415 COLL VENOUS BLD VENIPUNCTURE: CPT

## 2022-02-04 PROCEDURE — 84153 ASSAY OF PSA TOTAL: CPT

## 2022-02-10 ENCOUNTER — OFFICE VISIT (OUTPATIENT)
Dept: UROLOGY | Facility: CLINIC | Age: 67
End: 2022-02-10
Payer: MEDICARE

## 2022-02-10 VITALS
SYSTOLIC BLOOD PRESSURE: 118 MMHG | HEART RATE: 72 BPM | WEIGHT: 211 LBS | HEIGHT: 68 IN | BODY MASS INDEX: 31.98 KG/M2 | DIASTOLIC BLOOD PRESSURE: 68 MMHG

## 2022-02-10 DIAGNOSIS — N40.1 BENIGN PROSTATIC HYPERPLASIA WITH URINARY OBSTRUCTION: ICD-10-CM

## 2022-02-10 DIAGNOSIS — C61 PROSTATE CANCER (HCC): Primary | ICD-10-CM

## 2022-02-10 DIAGNOSIS — N13.8 BENIGN PROSTATIC HYPERPLASIA WITH URINARY OBSTRUCTION: ICD-10-CM

## 2022-02-10 DIAGNOSIS — N52.8 OTHER MALE ERECTILE DYSFUNCTION: ICD-10-CM

## 2022-02-10 PROCEDURE — 99214 OFFICE O/P EST MOD 30 MIN: CPT | Performed by: PHYSICIAN ASSISTANT

## 2022-02-10 RX ORDER — TADALAFIL 5 MG/1
5 TABLET ORAL DAILY
Qty: 90 TABLET | Refills: 3 | Status: SHIPPED | OUTPATIENT
Start: 2022-02-10

## 2022-02-10 NOTE — PROGRESS NOTES
2/10/2022      Chief Complaint   Patient presents with    Follow-up    Prostate Cancer         Assessment and Plan    73 y o  male managed by Dr Donavon Anderson  1  cT1c Francisco J 3+3=6 prostate cancer  - diagnosis 2/2019: 2 of 12 cores with francisco j 3+3=6 in <5% and 10% of core; 10 of 12 cores with HGPIN  - confirmatory bx 2/2020 no malignant cores  - mpMRI 2019, 2020 prostate volume 73cc; pirads 2 no lesions  - TALYA today smooth large prostate no asymmetry or nodule    2  BPH with weak stream  - cialis 5mg daily    3  ED  - cialis 5mg daily    Overall he feels really good from a urinary standpoint  Has some recent stressors with some sick family members recently and aging parents  PSA is further elevated from his baseline and has been on a rising trajectory over the last few years  Last was 7 5, current 10 8  We know he has a large prostate measured on MRI around 75ml  I do recommend repeat mpMRI at this time, just shy of 18 month surveillance lizzie anyway  He agrees to this plan  PSA 2/4/22 - 10 8  PSA 5/1/21 - 7 55 ; %Free 15  PSA 8/21/20 - 7 09  PSA 11/29/19 - 4 9  PSA 6/2/19 - 5 7  PSA 12/7/18 - 6 2  PSA 11/30/18 - 4 8  PSA 3/17/2018 - 5 1  PSA 3/19/2017 - 3 9  PSA 3/20/2016 - 2 5  PSA 2015 - 3 7  PSA 2014 - 2 7  PSA 2013 - 2 5    History of Present Illness  Kira Sung is a 73 y o  male here for evaluation of nine month follow-up low volume Francisco J six prostate cancer on active surveillance  Has had two biopsies and two MRIs to date  Presents today with a PSA which has further risen from his previous baseline of 4-5 up to 6-7 and now 10  He uses Cialis 5 mg daily for weak stream, urinary frequency and mild erectile dysfunction  He feels really good with this medication and is affordable  Not sexually active much right now due to other reasons but works [employer]  Had robotic left inguinal hernia repair summer 2021  He's working out and lifting few times a week          Review of Systems   Constitutional: Negative for activity change, appetite change, chills, fever and unexpected weight change  HENT: Negative  Respiratory: Negative  Negative for shortness of breath  Cardiovascular: Negative  Negative for chest pain  Gastrointestinal: Negative for abdominal pain, diarrhea, nausea and vomiting  Endocrine: Negative  Genitourinary: Negative for decreased urine volume, difficulty urinating, dysuria, flank pain, frequency, hematuria, testicular pain and urgency  Musculoskeletal: Negative for back pain and gait problem  Skin: Negative  Allergic/Immunologic: Negative  Neurological: Negative  Hematological: Negative for adenopathy  Does not bruise/bleed easily  AUA SYMPTOM SCORE      Most Recent Value   AUA SYMPTOM SCORE    How often have you had a sensation of not emptying your bladder completely after you finished urinating? 2   How often have you had to urinate again less than two hours after you finished urinating? 3   How often have you found you stopped and started again several times when you urinate? 1   How often have you found it difficult to postpone urination? 2   How often have you had a weak urinary stream? 3   How often have you had to push or strain to begin urination? 0   How many times did you most typically get up to urinate from the time you went to bed at night until the time you got up in the morning? 0   Quality of Life: If you were to spend the rest of your life with your urinary condition just the way it is now, how would you feel about that? --   AUA SYMPTOM SCORE 11           Vitals  Vitals:    02/10/22 0934   BP: 118/68   BP Location: Left arm   Patient Position: Sitting   Cuff Size: Adult   Pulse: 72   Weight: 95 7 kg (211 lb)   Height: 5' 8" (1 727 m)       Physical Exam  Vitals and nursing note reviewed  Constitutional:       General: He is not in acute distress  Appearance: Normal appearance  He is well-developed  He is not diaphoretic     HENT: Head: Normocephalic and atraumatic  Pulmonary:      Effort: Pulmonary effort is normal       Comments: No cough or audible wheeze  Abdominal:      General: There is no distension  Tenderness: There is no abdominal tenderness  There is no right CVA tenderness or left CVA tenderness  Genitourinary:     Comments: Circumcised penis, normal phallus, orthotopic patent meatus  Testes smooth descended bilaterally into the scrotum nontender with no palpable mass  Digital rectal exam smooth wide large prostate, without appreciable nodule, induration or asymmetry  Musculoskeletal:      Right lower leg: No edema  Left lower leg: No edema  Skin:     General: Skin is warm and dry  Neurological:      Mental Status: He is alert and oriented to person, place, and time  Gait: Gait normal    Psychiatric:         Speech: Speech normal          Behavior: Behavior normal            Past History  Past Medical History:   Diagnosis Date    Cancer ([place] [address] )     Colon polyp     Hypertension     Prostate cancer ([place] [address] )      Social History     Socioeconomic History    Marital status: /Civil Union     Spouse name: None    Number of children: None    Years of education: None    Highest education level: None   Occupational History    None   Tobacco Use    Smoking status: Never Smoker    Smokeless tobacco: Never Used   Vaping Use    Vaping Use: Never used   Substance and Sexual Activity    Alcohol use:  Yes     Alcohol/week: 3 0 standard drinks     Types: 3 Shots of liquor per week     Comment: socially    Drug use: Never    Sexual activity: None   Other Topics Concern    None   Social History Narrative    Caffeine use    Dental care, regularly    Exercises 3 to 4 times per week    Lives independently with spouse    Sun protection sunscreen    Uses safety equipment-seatbelts         Social Determinants of Health     Financial Resource Strain: Not on file   Food Insecurity: Not on file   Transportation Needs: Not on file   Physical Activity: Not on file   Stress: Not on file   Social Connections: Not on file   Intimate Partner Violence: Not on file   Housing Stability: Not on file     Social History     Tobacco Use   Smoking Status Never Smoker   Smokeless Tobacco Never Used     Family History   Problem Relation Age of Onset    Glaucoma Mother     Hyperlipidemia Mother     Heart attack Father     Hyperlipidemia Father     Glaucoma Maternal Grandmother        The following portions of the patient's history were reviewed and updated as appropriate: allergies, current medications, past medical history, past social history, past surgical history and problem list     Results  No results found for this or any previous visit (from the past 1 hour(s)) ]  Lab Results   Component Value Date    PSA 10 8 (H) 02/04/2022    PSA 4 9 (H) 11/29/2019    PSA 6 2 (H) 12/07/2018    PSA 4 8 (H) 11/30/2018     Lab Results   Component Value Date    CALCIUM 9 8 02/04/2022    K 4 3 02/04/2022    CO2 29 02/04/2022     02/04/2022    BUN 23 02/04/2022    CREATININE 1 27 02/04/2022     Lab Results   Component Value Date    WBC 6 20 02/04/2022    HGB 15 7 02/04/2022    HCT 47 0 02/04/2022    MCV 91 02/04/2022     02/04/2022

## 2022-03-18 ENCOUNTER — HOSPITAL ENCOUNTER (OUTPATIENT)
Dept: RADIOLOGY | Age: 67
Discharge: HOME | End: 2022-03-18
Payer: MEDICARE

## 2022-03-18 DIAGNOSIS — C61 PROSTATE CANCER (HCC): ICD-10-CM

## 2022-03-18 PROCEDURE — 76377 3D RENDER W/INTRP POSTPROCES: CPT

## 2022-03-18 PROCEDURE — G1004 CDSM NDSC: HCPCS

## 2022-03-18 PROCEDURE — A9585 GADOBUTROL INJECTION: HCPCS | Performed by: PHYSICIAN ASSISTANT

## 2022-03-18 PROCEDURE — 72197 MRI PELVIS W/O & W/DYE: CPT

## 2022-03-18 RX ADMIN — GADOBUTROL 9 ML: 604.72 INJECTION INTRAVENOUS at 08:11

## 2022-03-24 ENCOUNTER — TELEPHONE (OUTPATIENT)
Dept: UROLOGY | Facility: CLINIC | Age: 67
End: 2022-03-24

## 2022-03-24 DIAGNOSIS — C61 PROSTATE CANCER (HCC): Primary | ICD-10-CM

## 2022-03-24 NOTE — TELEPHONE ENCOUNTER
[place] 6 prostate cancer on surveillance  Please let Maxwell Hernandezmarcos know his surveillance MRI looks good  Prostate overall size has grown some from 75g in 2020 to 92g this year consistent with PSA rise  No nodules suspicious for cancer  No disease outside the prostate      Plan 6 month PSA

## 2022-09-21 ENCOUNTER — APPOINTMENT (OUTPATIENT)
Dept: LAB | Facility: CLINIC | Age: 67
End: 2022-09-21
Payer: MEDICARE

## 2022-09-21 DIAGNOSIS — C61 PROSTATE CANCER (HCC): ICD-10-CM

## 2022-09-21 LAB — PSA SERPL-MCNC: 7.5 NG/ML (ref 0–4)

## 2022-09-21 PROCEDURE — 84153 ASSAY OF PSA TOTAL: CPT

## 2022-10-12 PROBLEM — Z00.00 WELCOME TO MEDICARE PREVENTIVE VISIT: Status: RESOLVED | Noted: 2018-12-06 | Resolved: 2022-10-12

## 2022-12-15 ENCOUNTER — TELEPHONE (OUTPATIENT)
Dept: FAMILY MEDICINE CLINIC | Facility: CLINIC | Age: 67
End: 2022-12-15

## 2022-12-15 DIAGNOSIS — E78.2 MIXED HYPERLIPIDEMIA: Primary | ICD-10-CM

## 2022-12-15 DIAGNOSIS — I10 PRIMARY HYPERTENSION: ICD-10-CM

## 2022-12-15 NOTE — TELEPHONE ENCOUNTER
Pt has an upcoming appt with you next Thursday  He is asking if you would like him to get any lab work done prior to the visit

## 2022-12-16 ENCOUNTER — RA CDI HCC (OUTPATIENT)
Dept: OTHER | Facility: HOSPITAL | Age: 67
End: 2022-12-16

## 2022-12-16 NOTE — PROGRESS NOTES
[address]  coding opportunities       Chart reviewed, no opportunity found: CHART REVIEWED, NO OPPORTUNITY FOUND        Patients Insurance     Medicare Insurance: Medicare

## 2022-12-19 ENCOUNTER — APPOINTMENT (OUTPATIENT)
Dept: LAB | Facility: CLINIC | Age: 67
End: 2022-12-19

## 2022-12-19 DIAGNOSIS — I10 PRIMARY HYPERTENSION: ICD-10-CM

## 2022-12-19 DIAGNOSIS — E78.2 MIXED HYPERLIPIDEMIA: ICD-10-CM

## 2022-12-19 LAB
ALBUMIN SERPL BCP-MCNC: 4 G/DL (ref 3.5–5)
ALP SERPL-CCNC: 84 U/L (ref 46–116)
ALT SERPL W P-5'-P-CCNC: 29 U/L (ref 12–78)
ANION GAP SERPL CALCULATED.3IONS-SCNC: 7 MMOL/L (ref 4–13)
AST SERPL W P-5'-P-CCNC: 20 U/L (ref 5–45)
BASOPHILS # BLD AUTO: 0.04 THOUSANDS/ÂΜL (ref 0–0.1)
BASOPHILS NFR BLD AUTO: 1 % (ref 0–1)
BILIRUB SERPL-MCNC: 0.52 MG/DL (ref 0.2–1)
BUN SERPL-MCNC: 24 MG/DL (ref 5–25)
CALCIUM SERPL-MCNC: 9.5 MG/DL (ref 8.3–10.1)
CHLORIDE SERPL-SCNC: 106 MMOL/L (ref 96–108)
CHOLEST SERPL-MCNC: 200 MG/DL
CO2 SERPL-SCNC: 28 MMOL/L (ref 21–32)
CREAT SERPL-MCNC: 1.5 MG/DL (ref 0.6–1.3)
EOSINOPHIL # BLD AUTO: 0.2 THOUSAND/ÂΜL (ref 0–0.61)
EOSINOPHIL NFR BLD AUTO: 3 % (ref 0–6)
ERYTHROCYTE [DISTWIDTH] IN BLOOD BY AUTOMATED COUNT: 11.8 % (ref 11.6–15.1)
GFR SERPL CREATININE-BSD FRML MDRD: 47 ML/MIN/1.73SQ M
GLUCOSE P FAST SERPL-MCNC: 102 MG/DL (ref 65–99)
HCT VFR BLD AUTO: 45.4 % (ref 36.5–49.3)
HDLC SERPL-MCNC: 63 MG/DL
HGB BLD-MCNC: 14.6 G/DL (ref 12–17)
IMM GRANULOCYTES # BLD AUTO: 0.02 THOUSAND/UL (ref 0–0.2)
IMM GRANULOCYTES NFR BLD AUTO: 0 % (ref 0–2)
LDLC SERPL CALC-MCNC: 121 MG/DL (ref 0–100)
LYMPHOCYTES # BLD AUTO: 2.11 THOUSANDS/ÂΜL (ref 0.6–4.47)
LYMPHOCYTES NFR BLD AUTO: 31 % (ref 14–44)
MCH RBC QN AUTO: 29.9 PG (ref 26.8–34.3)
MCHC RBC AUTO-ENTMCNC: 32.2 G/DL (ref 31.4–37.4)
MCV RBC AUTO: 93 FL (ref 82–98)
MONOCYTES # BLD AUTO: 0.51 THOUSAND/ÂΜL (ref 0.17–1.22)
MONOCYTES NFR BLD AUTO: 7 % (ref 4–12)
NEUTROPHILS # BLD AUTO: 3.99 THOUSANDS/ÂΜL (ref 1.85–7.62)
NEUTS SEG NFR BLD AUTO: 58 % (ref 43–75)
NONHDLC SERPL-MCNC: 137 MG/DL
NRBC BLD AUTO-RTO: 0 /100 WBCS
PLATELET # BLD AUTO: 232 THOUSANDS/UL (ref 149–390)
PMV BLD AUTO: 9.6 FL (ref 8.9–12.7)
POTASSIUM SERPL-SCNC: 4.1 MMOL/L (ref 3.5–5.3)
PROT SERPL-MCNC: 6.9 G/DL (ref 6.4–8.4)
RBC # BLD AUTO: 4.88 MILLION/UL (ref 3.88–5.62)
SODIUM SERPL-SCNC: 141 MMOL/L (ref 135–147)
TRIGL SERPL-MCNC: 79 MG/DL
WBC # BLD AUTO: 6.87 THOUSAND/UL (ref 4.31–10.16)

## 2022-12-22 ENCOUNTER — OFFICE VISIT (OUTPATIENT)
Dept: FAMILY MEDICINE CLINIC | Facility: CLINIC | Age: 67
End: 2022-12-22

## 2022-12-22 VITALS
TEMPERATURE: 97.7 F | WEIGHT: 208 LBS | RESPIRATION RATE: 18 BRPM | BODY MASS INDEX: 31.52 KG/M2 | HEART RATE: 70 BPM | SYSTOLIC BLOOD PRESSURE: 124 MMHG | HEIGHT: 68 IN | DIASTOLIC BLOOD PRESSURE: 78 MMHG

## 2022-12-22 DIAGNOSIS — I10 HYPERTENSION, UNSPECIFIED TYPE: ICD-10-CM

## 2022-12-22 DIAGNOSIS — Z00.00 MEDICARE ANNUAL WELLNESS VISIT, SUBSEQUENT: Primary | ICD-10-CM

## 2022-12-22 DIAGNOSIS — Z23 NEED FOR INFLUENZA VACCINATION: ICD-10-CM

## 2022-12-22 PROBLEM — N18.31 STAGE 3A CHRONIC KIDNEY DISEASE (HCC): Status: RESOLVED | Noted: 2021-12-21 | Resolved: 2022-12-22

## 2022-12-22 PROBLEM — F11.20 CONTINUOUS OPIOID DEPENDENCE (HCC): Status: RESOLVED | Noted: 2021-12-21 | Resolved: 2022-12-22

## 2022-12-22 RX ORDER — LOSARTAN POTASSIUM AND HYDROCHLOROTHIAZIDE 12.5; 5 MG/1; MG/1
1 TABLET ORAL
Qty: 90 TABLET | Refills: 3 | Status: SHIPPED | OUTPATIENT
Start: 2022-12-22 | End: 2023-12-17

## 2022-12-22 NOTE — PROGRESS NOTES
Assessment and Plan:     Problem List Items Addressed This Visit        Cardiovascular and Mediastinum    Hypertension    Relevant Medications    losartan-hydrochlorothiazide (HYZAAR) 50-12 5 mg per tablet    Other Relevant Orders    Basic metabolic panel   Other Visit Diagnoses     Medicare annual wellness visit, subsequent    -  Primary    Need for influenza vaccination        Relevant Orders    influenza vaccine, high-dose, PF 0 7 mL (FLUZONE HIGH-DOSE) (Completed)      Flu shot today  Stay hydrated recheck bmp If gfr remains same level dc hct   Monitor Bp  Healthy diet/exercise   Annual/prn   Colonoscopy upcoming and urology followup     Preventive health issues were discussed with patient, and age appropriate screening tests were ordered as noted in patient's After Visit Summary  Personalized health advice and appropriate referrals for health education or preventive services given if needed, as noted in patient's After Visit Summary  History of Present Illness:     Patient presents for a Medicare Wellness Visit    HPI   Pt generally well since retired He assist with care of his elderly parents and his grandchildren He feels well and does exercise regularly No acute issues   Patient Care Team:  Darshan Guillaume DO as PCP - DO Nathaniel     Review of Systems:     Review of Systems   Constitutional: Negative for chills and fever  HENT: Negative  Eyes: Negative for visual disturbance  Respiratory: Negative for cough and shortness of breath  Cardiovascular: Negative for chest pain, palpitations and leg swelling  Gastrointestinal: Negative  Genitourinary: Negative  Musculoskeletal: Negative  Neurological: Negative for dizziness, light-headedness and headaches  Psychiatric/Behavioral: Negative for sleep disturbance  The patient is not nervous/anxious           Problem List:     Patient Active Problem List   Diagnosis   • Prostate cancer [facility])   • Hyperlipidemia   • Hypertension   • Flu vaccine need      Past Medical and Surgical History:     Past Medical History:   Diagnosis Date   • Cancer [facility])    • Colon polyp    • Hypertension    • Prostate cancer [facility])      Past Surgical History:   Procedure Laterality Date   • CARPAL TUNNEL RELEASE Bilateral    • COLONOSCOPY W/ BIOPSIES  04/15/2019    JENNIFER Gomez MD - Two 4mm polyps in the sigmoid colon, one 3mm polyp in the cecum, two 2 to 3mm polyps in the proximal transverse colon and in the cecum, diverticulosis in the sigmoid colon, examination was otherwise normal  Bx: tubular adenoma fragments, tubular adenoma  • COLONOSCOPY W/ BIOPSIES  04/07/2014    JENNIFER Gomez MD - Polyps  Bx: tubular adenoma, hyperplastic polyp  • FRACTURE SURGERY Right    • LEG SURGERY Right     tumor removal    • PROSTATE BIOPSY  02/27/2019   • PROSTATE BIOPSY N/A 02/21/2020    Procedure: TRANSRECTAL NEEDLE BIOPSY PROSTATE (TRNBP) TRANSPERINEAL APPROACH;  Surgeon: Tam Michaud MD;  Location: [facility];  Service: Urology      Family History:     Family History   Problem Relation Age of Onset   • Glaucoma Mother    • Hyperlipidemia Mother    • Stroke Mother    • Dementia Mother    • Heart attack Father    • Hyperlipidemia Father    • Kidney disease Father         stage 4   • Glaucoma Maternal Grandmother       Social History:     Social History     Socioeconomic History   • Marital status: /Civil Union     Spouse name: None   • Number of children: None   • Years of education: None   • Highest education level: None   Occupational History   • Occupation: retired   Tobacco Use   • Smoking status: Never   • Smokeless tobacco: Never   Vaping Use   • Vaping Use: Never used   Substance and Sexual Activity   • Alcohol use:  Yes     Alcohol/week: 3 0 - 7 0 standard drinks     Types: 3 - 7 Standard drinks or equivalent per week     Comment: socially   • Drug use: Never   • Sexual activity: None   Other Topics Concern   • None   Social History Narrative    Caffeine use    Dental care, regularly    Exercises 3 to 4 times per week    Lives independently with spouse    Sun protection sunscreen    Uses safety equipment-seatbelts         Social Determinants of Health     Financial Resource Strain: Low Risk    • Difficulty of Paying Living Expenses: Not hard at all   Food Insecurity: Not on file   Transportation Needs: No Transportation Needs   • Lack of Transportation (Medical): No   • Lack of Transportation (Non-Medical): No   Physical Activity: Not on file   Stress: Not on file   Social Connections: Not on file   Intimate Partner Violence: Not on file   Housing Stability: Not on file      Medications and Allergies:     Current Outpatient Medications   Medication Sig Dispense Refill   • Ascorbic Acid (VITAMIN C PO) Take by mouth daily     • Cholecalciferol (VITAMIN D3 PO) Take by mouth     • losartan-hydrochlorothiazide (HYZAAR) 50-12 5 mg per tablet Take 1 tablet by mouth daily in the early morning 90 tablet 3   • MULTIPLE VITAMINS ESSENTIAL PO Take 1 tablet by mouth daily     • Na Sulfate-K Sulfate-Mg Sulf (Suprep Bowel Prep Kit) 17 5-3 13-1 6 GM/177ML SOLN Take 2 Bottles by mouth once for 1 dose 354 mL 0   • tadalafil (CIALIS) 5 MG tablet Take 1 tablet (5 mg total) by mouth daily 90 tablet 3     No current facility-administered medications for this visit       No Known Allergies   Immunizations:     Immunization History   Administered Date(s) Administered   • COVID-19 MODERNA VACC 0 5 ML IM 04/12/2021, 05/11/2021   • Influenza, high dose seasonal 0 7 mL 12/21/2021, 12/22/2022   • Influenza, recombinant, quadrivalent,injectable, preservative free 12/13/2019, 12/18/2020   • Tdap 02/26/2019   • Zoster 12/05/2016      Health Maintenance:         Topic Date Due   • Colorectal Cancer Screening  04/15/2022   • Hepatitis C Screening  Completed         Topic Date Due   • Hepatitis B Vaccine (1 of 3 - 3-dose series) Never done   • COVID-19 Vaccine (3 - Booster for Moderna series) 07/06/2021   • Pneumococcal Vaccine: 65+ Years (1 - PCV) Never done      Medicare Screening Tests and Risk Assessments:     Chantelle Soto is here for his Subsequent Wellness visit  Last Medicare Wellness visit information reviewed, patient interviewed, no change since last AWV  Health Risk Assessment:   Patient rates overall health as very good  Patient feels that their physical health rating is slightly better  Patient is satisfied with their life  Eyesight was rated as same  Hearing was rated as same  Patient feels that their emotional and mental health rating is same  Patients states they are never, rarely angry  Patient states they are never, rarely unusually tired/fatigued  Pain experienced in the last 7 days has been some  Patient's pain rating has been 4/10  Patient states that he has experienced no weight loss or gain in last 6 months  Depression Screening:   PHQ-2 Score: 0      Fall Risk Screening: In the past year, patient has experienced: no history of falling in past year      Home Safety:  Patient does not have trouble with stairs inside or outside of their home  Patient has working smoke alarms and has working carbon monoxide detector  Home safety hazards include: none  Nutrition:   Current diet is Regular  Medications:   Patient is currently taking over-the-counter supplements  OTC medications include: Vitamin D & C  and multi vitamins  Patient is able to manage medications  Activities of Daily Living (ADLs)/Instrumental Activities of Daily Living (IADLs):   Walk and transfer into and out of bed and chair?: Yes  Dress and groom yourself?: Yes    Bathe or shower yourself?: Yes    Feed yourself?  Yes  Do your laundry/housekeeping?: Yes  Manage your money, pay your bills and track your expenses?: Yes  Make your own meals?: Yes    Do your own shopping?: Yes    Previous Hospitalizations:   Any hospitalizations or ED visits within the last 12 months?: No      Advance Care Planning:   Living will: Yes    Durable POA for healthcare: Yes    Advanced directive: Yes    End of Life Decisions reviewed with patient: Yes    Provider agrees with end of life decisions: Yes      Cognitive Screening:   Provider or family/friend/caregiver concerned regarding cognition?: No    PREVENTIVE SCREENINGS      Cardiovascular Screening:    General: History Lipid Disorder and Screening Current      Diabetes Screening:     General: Screening Current      Colorectal Cancer Screening:     General: Screening Current      Prostate Cancer Screening:    General: History Prostate Cancer and Screening Current      Osteoporosis Screening:    General: Screening Not Indicated      Abdominal Aortic Aneurysm (AAA) Screening:    Risk factors include: age between 73-69 yo        General: Screening Not Indicated      Lung Cancer Screening:     General: Screening Not Indicated      Hepatitis C Screening:    General: Screening Current    Screening, Brief Intervention, and Referral to Treatment (SBIRT)    Screening  Typical number of drinks in a day: 1  Typical number of drinks in a week: 3  Interpretation: Low risk drinking behavior  AUDIT-C Screenin) How often did you have a drink containing alcohol in the past year? 2 to 4 times a month  2) How many drinks did you have on a typical day when you were drinking in the past year? 1 to 2  3) How often did you have 6 or more drinks on one occasion in the past year? never    AUDIT-C Score: 2  Interpretation: Score 0-3 (male): Negative screen for alcohol misuse    Single Item Drug Screening:  How often have you used an illegal drug (including marijuana) or a prescription medication for non-medical reasons in the past year? never    Single Item Drug Screen Score: 0  Interpretation: Negative screen for possible drug use disorder    Brief Intervention  Alcohol & drug use screenings were reviewed  No concerns regarding substance use disorder identified       Other Counseling Topics:   Calcium and vitamin D intake and regular weightbearing exercise  No results found  Physical Exam:     /78   Pulse 70   Temp 97 7 °F (36 5 °C)   Resp 18   Ht 5' 8" (1 727 m)   Wt 94 3 kg (208 lb)   BMI 31 63 kg/m²     Physical Exam  Vitals reviewed  Constitutional:       General: He is not in acute distress  Appearance: Normal appearance  He is not ill-appearing, toxic-appearing or diaphoretic  HENT:      Head: Normocephalic and atraumatic  Right Ear: External ear normal       Left Ear: External ear normal       Nose: Nose normal       Mouth/Throat:      Mouth: Mucous membranes are dry  Eyes:      General: No scleral icterus  Extraocular Movements: Extraocular movements intact  Conjunctiva/sclera: Conjunctivae normal       Pupils: Pupils are equal, round, and reactive to light  Cardiovascular:      Rate and Rhythm: Normal rate and regular rhythm  Pulses: Normal pulses  Pulmonary:      Effort: Pulmonary effort is normal  No respiratory distress  Breath sounds: Normal breath sounds  No wheezing  Abdominal:      General: Bowel sounds are normal  There is no distension  Palpations: Abdomen is soft  Tenderness: There is no abdominal tenderness  Musculoskeletal:      Cervical back: Normal range of motion and neck supple  No rigidity  Right lower leg: No edema  Left lower leg: No edema  Lymphadenopathy:      Cervical: No cervical adenopathy  Neurological:      General: No focal deficit present  Mental Status: He is alert and oriented to person, place, and time  Mental status is at baseline  Psychiatric:         Mood and Affect: Mood normal          Behavior: Behavior normal          Thought Content:  Thought content normal          Judgment: Judgment normal           Prateek Perez DO

## 2023-02-09 ENCOUNTER — LAB (OUTPATIENT)
Dept: LAB | Facility: CLINIC | Age: 68
End: 2023-02-09

## 2023-02-09 DIAGNOSIS — C61 PROSTATE CANCER (HCC): ICD-10-CM

## 2023-02-09 DIAGNOSIS — I10 HYPERTENSION, UNSPECIFIED TYPE: ICD-10-CM

## 2023-02-09 LAB
ANION GAP SERPL CALCULATED.3IONS-SCNC: 2 MMOL/L (ref 4–13)
BUN SERPL-MCNC: 25 MG/DL (ref 5–25)
CALCIUM SERPL-MCNC: 9.4 MG/DL (ref 8.3–10.1)
CHLORIDE SERPL-SCNC: 109 MMOL/L (ref 96–108)
CO2 SERPL-SCNC: 29 MMOL/L (ref 21–32)
CREAT SERPL-MCNC: 1.35 MG/DL (ref 0.6–1.3)
GFR SERPL CREATININE-BSD FRML MDRD: 54 ML/MIN/1.73SQ M
GLUCOSE P FAST SERPL-MCNC: 93 MG/DL (ref 65–99)
POTASSIUM SERPL-SCNC: 4.4 MMOL/L (ref 3.5–5.3)
PSA SERPL-MCNC: 8.3 NG/ML (ref 0–4)
SODIUM SERPL-SCNC: 140 MMOL/L (ref 135–147)

## 2023-02-10 DIAGNOSIS — I10 HYPERTENSION, UNSPECIFIED TYPE: Primary | ICD-10-CM

## 2023-02-10 RX ORDER — LOSARTAN POTASSIUM 50 MG/1
50 TABLET ORAL DAILY
COMMUNITY
End: 2023-02-10 | Stop reason: SDUPTHER

## 2023-02-10 RX ORDER — LOSARTAN POTASSIUM 50 MG/1
50 TABLET ORAL DAILY
Qty: 90 TABLET | Refills: 3 | Status: SHIPPED | OUTPATIENT
Start: 2023-02-10

## 2023-02-10 NOTE — RESULT ENCOUNTER NOTE
Patient aware wants to know if you are keeping his losartan the same?      (If staying the same, no need to call back)

## 2023-02-16 ENCOUNTER — OFFICE VISIT (OUTPATIENT)
Dept: UROLOGY | Facility: CLINIC | Age: 68
End: 2023-02-16

## 2023-02-16 VITALS
OXYGEN SATURATION: 98 % | BODY MASS INDEX: 32.39 KG/M2 | HEART RATE: 58 BPM | SYSTOLIC BLOOD PRESSURE: 130 MMHG | DIASTOLIC BLOOD PRESSURE: 80 MMHG | WEIGHT: 213 LBS

## 2023-02-16 DIAGNOSIS — K43.9 VENTRAL HERNIA WITHOUT OBSTRUCTION OR GANGRENE: ICD-10-CM

## 2023-02-16 DIAGNOSIS — C61 PROSTATE CANCER (HCC): Primary | ICD-10-CM

## 2023-02-16 NOTE — PROGRESS NOTES
2/16/2023      No chief complaint on file  Assessment and Plan    78 y o  male managed by Dr Salma Clements    1  cT1c [place] 3+3=6 prostate cancer  - <5% and 10% of core in 2 of 12 cores  - confirmatory trux bx 2/2020 no malignant cores  - mpMRI 2019, 2020, 2022; large gland 95g pirads 2 no nodule/lesion  - TALYA today smooth flat large prostate no nodule    2  BPH with weak stream  3  ED  -cialis 5mg daily switch to AM daily dosing, consider adding penile occlusive ring for erection maintenance prn      Harper Rasp is appropriate for continued active surveillance for his low volume [place] 6 prostate cancer diagnosed 4 years ago  We will continue PSA every 6 months, TALYA yearly, and MRI every 24 months  He agrees with this plan  For his BPH, he does have a large gland 95g I think this is driving most of his elevation in the PSA  Symptom wise he feels pretty good with daily Cialis for both voiding symptoms and erections, will hold off on adding alpha blocker rx but if he has progressive obstructive symptoms that would be an option as well  History of Present Illness  Carmenza Pierce is a 78 y o  male here for evaluation of 1 year follow-up [place] 6 prostate cancer on active surveillance since 2019  He has a large prostate gland measuring 95 g on recent MRI, there is no focal lesion, overall PI-RADS 2 low risk for clinically significant prostate cancer  PSA has ranged from 7-10, with reasonable PSA density given his large gland size  Returns today with no new concerns  PSA 8 3  Taking his daily cialis in the evenings, has 3am erections, but when ready for intimacy in the evenings has difficulty maintaining rigidity  Flow is slow but no dribbling, some days are really good      PSA 2/9/2023 - 8 3  PSA 9/21/22 - 7 5  PSA 2/4/22 - 10 8  PSA 5/1/21 - 7 55 ; %Free 15  PSA 8/21/20 - 7 09  PSA 11/29/19 - 4 9  PSA 6/2/19 - 5 7  PSA 12/7/18 - 6 2  PSA 11/30/18 - 4 8  PSA 3/17/2018 - 5 1  PSA 3/19/2017 - 3 9  PSA 3/20/2016 - 2 5  PSA 2015 - 3 7  PSA 2014 - 2 7  PSA 2013 - 2 5    Review of Systems   Constitutional: Negative for activity change, appetite change, chills, fever and unexpected weight change  HENT: Negative  Respiratory: Negative  Negative for shortness of breath  Cardiovascular: Negative  Negative for chest pain  Gastrointestinal: Negative for abdominal pain, diarrhea, nausea and vomiting  Endocrine: Negative  Genitourinary: Negative for decreased urine volume, difficulty urinating, dysuria, flank pain, frequency, hematuria, testicular pain and urgency  Musculoskeletal: Negative for back pain and gait problem  Skin: Negative  Allergic/Immunologic: Negative  Neurological: Negative  Hematological: Negative for adenopathy  Does not bruise/bleed easily  AUA SYMPTOM SCORE    Flowsheet Row Most Recent Value   AUA SYMPTOM SCORE    How often have you had a sensation of not emptying your bladder completely after you finished urinating? 1 (P)     How often have you had to urinate again less than two hours after you finished urinating? 1 (P)     How often have you found you stopped and started again several times when you urinate? 0 (P)     How often have you found it difficult to postpone urination? 3 (P)     How often have you had a weak urinary stream? 4 (P)     How often have you had to push or strain to begin urination? 0 (P)     How many times did you most typically get up to urinate from the time you went to bed at night until the time you got up in the morning? 1 (P)     Quality of Life: If you were to spend the rest of your life with your urinary condition just the way it is now, how would you feel about that? 2 (P)     AUA SYMPTOM SCORE 10 (P)            Vitals  Vitals:    02/16/23 0921   BP: 130/80   BP Location: Right arm   Patient Position: Sitting   Cuff Size: Large   Pulse: 58   SpO2: 98%   Weight: 96 6 kg (213 lb)       Physical Exam  Vitals and nursing note reviewed  Constitutional:       General: He is not in acute distress  Appearance: Normal appearance  He is well-developed  He is not diaphoretic  HENT:      Head: Normocephalic and atraumatic  Pulmonary:      Effort: Pulmonary effort is normal       Comments: No cough or audible wheeze  Abdominal:      General: There is no distension  Tenderness: There is no abdominal tenderness  There is no right CVA tenderness or left CVA tenderness  Genitourinary:     Comments: Circumcised penis, normal phallus, orthotopic patent meatus  Testes smooth descended bilaterally into the scrotum nontender with no palpable mass  Digital rectal exam smooth flat large prostate, without appreciable nodule, induration or asymmetry  Musculoskeletal:      Right lower leg: No edema  Left lower leg: No edema  Skin:     General: Skin is warm and dry  Neurological:      Mental Status: He is alert and oriented to person, place, and time  Gait: Gait normal    Psychiatric:         Speech: Speech normal          Behavior: Behavior normal            Past History  Past Medical History:   Diagnosis Date   • Cancer ([facility] )    • Colon polyp    • Hypertension    • Prostate cancer ([facility] )      Social History     Socioeconomic History   • Marital status: /Civil Union     Spouse name: None   • Number of children: None   • Years of education: None   • Highest education level: None   Occupational History   • Occupation: retired   Tobacco Use   • Smoking status: Never   • Smokeless tobacco: Never   Vaping Use   • Vaping Use: Never used   Substance and Sexual Activity   • Alcohol use:  Yes     Alcohol/week: 3 0 - 7 0 standard drinks     Types: 3 - 7 Standard drinks or equivalent per week     Comment: socially   • Drug use: Never   • Sexual activity: Yes   Other Topics Concern   • None   Social History Narrative    Caffeine use    Dental care, regularly    Exercises 3 to 4 times per week    Lives independently with spouse    Devin Marcos miya sunscreen    Uses safety equipment-seatbelts         Social Determinants of Health     Financial Resource Strain: Low Risk    • Difficulty of Paying Living Expenses: Not hard at all   Food Insecurity: Not on file   Transportation Needs: No Transportation Needs   • Lack of Transportation (Medical): No   • Lack of Transportation (Non-Medical):  No   Physical Activity: Not on file   Stress: Not on file   Social Connections: Not on file   Intimate Partner Violence: Not on file   Housing Stability: Not on file     Social History     Tobacco Use   Smoking Status Never   Smokeless Tobacco Never     Family History   Problem Relation Age of Onset   • Glaucoma Mother    • Hyperlipidemia Mother    • Stroke Mother    • Dementia Mother    • Heart attack Father    • Hyperlipidemia Father    • Kidney disease Father         stage 3   • Glaucoma Maternal Grandmother        The following portions of the patient's history were reviewed and updated as appropriate: allergies, current medications, past medical history, past social history, past surgical history and problem list     Results  No results found for this or any previous visit (from the past 1 hour(s)) ]  Lab Results   Component Value Date    PSA 8 3 (H) 02/09/2023    PSA 7 5 (H) 09/21/2022    PSA 10 8 (H) 02/04/2022    PSA 4 9 (H) 11/29/2019     Lab Results   Component Value Date    CALCIUM 9 4 02/09/2023    K 4 4 02/09/2023    CO2 29 02/09/2023     (H) 02/09/2023    BUN 25 02/09/2023    CREATININE 1 35 (H) 02/09/2023     Lab Results   Component Value Date    WBC 6 87 12/19/2022    HGB 14 6 12/19/2022    HCT 45 4 12/19/2022    MCV 93 12/19/2022     12/19/2022

## 2023-02-20 ENCOUNTER — TELEPHONE (OUTPATIENT)
Dept: FAMILY MEDICINE CLINIC | Facility: CLINIC | Age: 68
End: 2023-02-20

## 2023-02-20 DIAGNOSIS — I10 PRIMARY HYPERTENSION: Primary | ICD-10-CM

## 2023-02-20 RX ORDER — LOSARTAN POTASSIUM AND HYDROCHLOROTHIAZIDE 12.5; 5 MG/1; MG/1
1 TABLET ORAL DAILY
Qty: 90 TABLET | Refills: 3 | Status: SHIPPED | OUTPATIENT
Start: 2023-02-20

## 2023-02-20 RX ORDER — LOSARTAN POTASSIUM AND HYDROCHLOROTHIAZIDE 12.5; 5 MG/1; MG/1
1 TABLET ORAL DAILY
COMMUNITY
End: 2023-02-20 | Stop reason: SDUPTHER

## 2023-02-20 RX ORDER — HYDROCHLOROTHIAZIDE 12.5 MG/1
12.5 CAPSULE, GELATIN COATED ORAL DAILY
Qty: 90 CAPSULE | Refills: 1 | Status: SHIPPED | OUTPATIENT
Start: 2023-02-20 | End: 2023-02-20 | Stop reason: ALTCHOICE

## 2023-02-20 NOTE — TELEPHONE ENCOUNTER
I sent hctz 12 5mg to pharmacy since he just got plain Losartan rx 2/10 He can take both together to use his ecent plain Losartan rx up  When close to finishing that rx call and can send combination rx again

## 2023-02-20 NOTE — TELEPHONE ENCOUNTER
Monitored BP for the last 9 days with the straight losartan, wants to go back on the losartan hctz due to pressure has been up 10-12 points and some weight gain  Please advise

## 2023-02-20 NOTE — TELEPHONE ENCOUNTER
Spoke with pt, said the plain losartan didn't cost him a lot of money so he would rather just go right back on the combination pill, is this okay to send in?

## 2023-03-02 ENCOUNTER — CONSULT (OUTPATIENT)
Dept: SURGERY | Facility: CLINIC | Age: 68
End: 2023-03-02

## 2023-03-02 VITALS
HEIGHT: 68 IN | TEMPERATURE: 97.3 F | DIASTOLIC BLOOD PRESSURE: 70 MMHG | SYSTOLIC BLOOD PRESSURE: 138 MMHG | OXYGEN SATURATION: 96 % | WEIGHT: 211 LBS | HEART RATE: 81 BPM | BODY MASS INDEX: 31.98 KG/M2

## 2023-03-02 DIAGNOSIS — K43.9 VENTRAL HERNIA WITHOUT OBSTRUCTION OR GANGRENE: ICD-10-CM

## 2023-03-02 RX ORDER — SODIUM CHLORIDE, SODIUM LACTATE, POTASSIUM CHLORIDE, CALCIUM CHLORIDE 600; 310; 30; 20 MG/100ML; MG/100ML; MG/100ML; MG/100ML
125 INJECTION, SOLUTION INTRAVENOUS CONTINUOUS
Status: CANCELLED | OUTPATIENT
Start: 2023-03-02

## 2023-03-02 RX ORDER — CHLORHEXIDINE GLUCONATE 4 G/100ML
SOLUTION TOPICAL DAILY PRN
Status: CANCELLED | OUTPATIENT
Start: 2023-03-02

## 2023-03-02 RX ORDER — HEPARIN SODIUM 5000 [USP'U]/ML
5000 INJECTION, SOLUTION INTRAVENOUS; SUBCUTANEOUS ONCE
Status: CANCELLED | OUTPATIENT
Start: 2023-03-02 | End: 2023-03-02

## 2023-03-02 NOTE — H&P
General Surgery  Name: Nimesh Edwards : 1955  MRN: [id number]      Assessment/Plan:    Ventral hernia without obstruction or gangrene  78 y o  male with a PMH laparoscopic left inguinal hernia repair with mesh () presents with ventral hernia  Defect size approximately 2cm palpable supraumbilical  Patient endorses discomfort associated with hernia, and states he is able to reduce it by laying down and relaxing his abdominal wall  No skin changes  No constipation, nausea, or emesis  Extensive discussion about risks, benefits, and alternatives  Patient wishes to proceed with operative intervention  Given size of defect will likely proceed with open ventral hernia repair without mesh  Consent obtained in office  All questions addressed and answered  Recent blood work reviewed  Patient up to date with standard follow up by PCP, GI, and Urology  Will obtain pre-operative EKG  Informed patient of recovery period and 15lb weight limitations as well as risk of recurrence particularly if not adherent         Diagnoses and all orders for this visit:    Ventral hernia without obstruction or gangrene  -     Ambulatory Referral to General Surgery  -     ECG 12 lead;  Future  -     Case request operating room: [facility]; Standing  -     Case request operating room: [place] [id number]    Other orders  -     heparin (porcine) subcutaneous injection 5,000 Units  -     Apply SCD or Foot pumps; Standing  -     Diet NPO; Sips with meds; Standing  -     Apply Sequential Compression Device; Standing  -     Place sequential compression device; Standing  -     Vital signs; Standing  -     Insert and maintain IV line; Standing  -     Void; Standing  -     Shave and Prep; Standing  -     ceFAZolin (ANCEF) 2,000 mg in dextrose 5 % 100 mL IVPB  -     lactated ringers infusion  -     chlorhexidine (HIBICLENS) 4 % topical liquid  -     Electrocardiogram, 12-lead; Standing         Subjective:     Patient ID: Lisette Will is a 78 y o  male  Lisette Will is a 78 y o  male with a PMHx cT1c [place] 6 prostate cancer followed closely by Urology, BPH, prior laparoscopic inguinal hernia repair with mesh (2021), HTN who presents with ventral hernia  Patient states he first noticed it 3 weeks ago after lifting heavy items  Patient is very active at baseline  He endorses discomfort associated with the area and sometimes can see a bulge above his belly button that is able to reduce when he lays down and relaxes his abdominal wall  Patient recently had a colonoscopy which was unrevealing of any polyps but does have a history of multiple polyps on prior colonoscopies  Patient denies constipation and endorses regular daily bowel movements  Denies skin changes  Denies nausea or emesis  The following portions of the patient's history were reviewed and updated as appropriate:   He  has a past medical history of Cancer [facility]), Colon polyp, Hypertension, and Prostate cancer ([place] [address] )  He   Patient Active Problem List    Diagnosis Date Noted   • Ventral hernia without obstruction or gangrene 03/02/2023   • Flu vaccine need 12/18/2020   • [facility] 05[facility]30[facility]2018   • Hyperlipidemia 10/10/2012   • Hypertension 10/10/2012     He  has a past surgical history that includes Leg Surgery (Right); Prostate biopsy (02/27/2019); Fracture surgery (Right); Carpal tunnel release (Bilateral); Colonoscopy w/ biopsies (04/15/2019); Prostate biopsy (N/A, 02/21/2020); and Colonoscopy w/ biopsies (04/07/2014)  His family history includes Dementia in his mother; Glaucoma in his maternal grandmother and mother; Heart attack in his father; Hyperlipidemia in his father and mother; Kidney disease in his father; Stroke in his mother  He  reports that he has never smoked  He has never used smokeless tobacco  He reports current alcohol use of about 3 0 - 7 0 standard drinks per week  He reports that he does not use drugs    Current Outpatient Medications   Medication Sig Dispense Refill   • Ascorbic Acid (VITAMIN C PO) Take by mouth daily     • Cholecalciferol (VITAMIN D3 PO) Take by mouth     • losartan-hydrochlorothiazide (HYZAAR) 50-12 5 mg per tablet Take 1 tablet by mouth daily 90 tablet 3   • MULTIPLE VITAMINS ESSENTIAL PO Take 1 tablet by mouth daily     • tadalafil (CIALIS) 5 MG tablet Take 1 tablet (5 mg total) by mouth daily 90 tablet 3   • Na Sulfate-K Sulfate-Mg Sulf (Suprep Bowel Prep Kit) 17 5-3 13-1 6 GM/177ML SOLN Take 2 Bottles by mouth once for 1 dose 354 mL 0     No current facility-administered medications for this visit  He has No Known Allergies       Review of Systems   Constitutional: Negative  HENT: Negative  Eyes: Negative  Respiratory: Negative  Cardiovascular: Negative  Gastrointestinal: Negative  Negative for constipation, diarrhea, nausea and vomiting  Endocrine: Negative  Genitourinary: Negative  Musculoskeletal: Negative  Skin: Negative  Negative for color change  Allergic/Immunologic: Negative  Neurological: Negative  Hematological: Negative  Psychiatric/Behavioral: Negative  All other systems reviewed and are negative  Objective:      /70 (BP Location: Right arm, Patient Position: Sitting, Cuff Size: Standard)   Pulse 81   Temp (!) 97 3 °F (36 3 °C) (Tympanic)   Ht 5' 8" (1 727 m)   Wt 95 7 kg (211 lb)   SpO2 96%   BMI 32 08 kg/m²         Physical Exam  Vitals reviewed  Constitutional:       General: He is not in acute distress  Appearance: Normal appearance  He is not toxic-appearing or diaphoretic  HENT:      Head: Normocephalic and atraumatic  Right Ear: External ear normal       Left Ear: External ear normal       Nose: Nose normal       Mouth/Throat:      Mouth: Mucous membranes are moist       Pharynx: Oropharynx is clear  Eyes:      Extraocular Movements: Extraocular movements intact        Conjunctiva/sclera: Conjunctivae normal  Cardiovascular:      Rate and Rhythm: Normal rate  Pulmonary:      Effort: Pulmonary effort is normal  No respiratory distress  Breath sounds: Normal breath sounds  Abdominal:      General: There is no distension  Palpations: Abdomen is soft  Tenderness: There is no guarding or rebound  Hernia: A hernia (palpable 2 cm defect supraumbilical, no diastasis recti, no skin changes) is present  Genitourinary:     Comments: deferred  Musculoskeletal:         General: No swelling  Normal range of motion  Cervical back: Normal range of motion  Skin:     General: Skin is warm and dry  Neurological:      General: No focal deficit present  Mental Status: He is alert  Cranial Nerves: No cranial nerve deficit  Motor: No weakness  Psychiatric:         Mood and Affect: Mood normal          Thought Content: Thought content normal         Thorough chart review performed including colonoscopy results, recent laboratory review, and progress notes from PCP, Urology, and Gastroenterology

## 2023-03-02 NOTE — PROGRESS NOTES
General Surgery  Name: Karolina Serrano : 1955  MRN: [id number]      Assessment/Plan:    Ventral hernia without obstruction or gangrene  78 y o  male with a PMH laparoscopic left inguinal hernia repair with mesh () presents with ventral hernia  Defect size approximately 2cm palpable supraumbilical  Patient endorses discomfort associated with hernia, and states he is able to reduce it by laying down and relaxing his abdominal wall  No skin changes  No constipation, nausea, or emesis  Extensive discussion about risks, benefits, and alternatives  Patient wishes to proceed with operative intervention  Given size of defect will likely proceed with open ventral hernia repair without mesh  Consent obtained in office  All questions addressed and answered  Recent blood work reviewed  Patient up to date with standard follow up by PCP, GI, and Urology  Will obtain pre-operative EKG  Informed patient of recovery period and 15lb weight limitations as well as risk of recurrence particularly if not adherent          Diagnoses and all orders for this visit:    Ventral hernia without obstruction or gangrene  -     Ambulatory Referral to General Surgery  -     ECG 12 lead;  Future  -     Case request operating room: [facility]; Standing  -     Case request operating room: [place] [address]    Other orders  -     heparin (porcine) subcutaneous injection 5,000 Units  -     Apply SCD or Foot pumps; Standing  -     Diet NPO; Sips with meds; Standing  -     Apply Sequential Compression Device; Standing  -     Place sequential compression device; Standing  -     Vital signs; Standing  -     Insert and maintain IV line; Standing  -     Void; Standing  -     Shave and Prep; Standing  -     ceFAZolin (ANCEF) 2,000 mg in dextrose 5 % 100 mL IVPB  -     lactated ringers infusion  -     chlorhexidine (HIBICLENS) 4 % topical liquid  -     Electrocardiogram, 12-lead; Standing          Subjective:      Patient ID: Dany Granger is a 78 y o  male  Dany Granger is a 78 y o  male with a PMHx cT1c [place] 6 prostate cancer followed closely by Urology, BPH, prior laparoscopic inguinal hernia repair with mesh (2021), HTN who presents with ventral hernia  Patient states he first noticed it 3 weeks ago after lifting heavy items  Patient is very active at baseline  He endorses discomfort associated with the area and sometimes can see a bulge above his belly button that is able to reduce when he lays down and relaxes his abdominal wall  Patient recently had a colonoscopy which was unrevealing of any polyps but does have a history of multiple polyps on prior colonoscopies  Patient denies constipation and endorses regular daily bowel movements  Denies skin changes  Denies nausea or emesis  The following portions of the patient's history were reviewed and updated as appropriate:   He  has a past medical history of Cancer [facility]), Colon polyp, Hypertension, and Prostate cancer ([place] [address] )  He   Patient Active Problem List    Diagnosis Date Noted   • Ventral hernia without obstruction or gangrene 03/02/2023   • Flu vaccine need 12/18/2020   • [facility] 05/30[facility]   • Hyperlipidemia 10/10/2012   • Hypertension 10/10/2012     He  has a past surgical history that includes Leg Surgery (Right); Prostate biopsy (02/27/2019); Fracture surgery (Right); Carpal tunnel release (Bilateral); Colonoscopy w/ biopsies (04/15/2019); Prostate biopsy (N/A, 02/21/2020); and Colonoscopy w/ biopsies (04/07/2014)  His family history includes Dementia in his mother; Glaucoma in his maternal grandmother and mother; Heart attack in his father; Hyperlipidemia in his father and mother; Kidney disease in his father; Stroke in his mother  He  reports that he has never smoked  He has never used smokeless tobacco  He reports current alcohol use of about 3 0 - 7 0 standard drinks per week  He reports that he does not use drugs    Current Outpatient Medications   Medication Sig Dispense Refill   • Ascorbic Acid (VITAMIN C PO) Take by mouth daily     • Cholecalciferol (VITAMIN D3 PO) Take by mouth     • losartan-hydrochlorothiazide (HYZAAR) 50-12 5 mg per tablet Take 1 tablet by mouth daily 90 tablet 3   • MULTIPLE VITAMINS ESSENTIAL PO Take 1 tablet by mouth daily     • tadalafil (CIALIS) 5 MG tablet Take 1 tablet (5 mg total) by mouth daily 90 tablet 3   • Na Sulfate-K Sulfate-Mg Sulf (Suprep Bowel Prep Kit) 17 5-3 13-1 6 GM/177ML SOLN Take 2 Bottles by mouth once for 1 dose 354 mL 0     No current facility-administered medications for this visit  He has No Known Allergies       Review of Systems   Constitutional: Negative  HENT: Negative  Eyes: Negative  Respiratory: Negative  Cardiovascular: Negative  Gastrointestinal: Negative  Negative for constipation, diarrhea, nausea and vomiting  Endocrine: Negative  Genitourinary: Negative  Musculoskeletal: Negative  Skin: Negative  Negative for color change  Allergic/Immunologic: Negative  Neurological: Negative  Hematological: Negative  Psychiatric/Behavioral: Negative  All other systems reviewed and are negative  Objective:      /70 (BP Location: Right arm, Patient Position: Sitting, Cuff Size: Standard)   Pulse 81   Temp (!) 97 3 °F (36 3 °C) (Tympanic)   Ht 5' 8" (1 727 m)   Wt 95 7 kg (211 lb)   SpO2 96%   BMI 32 08 kg/m²          Physical Exam  Vitals reviewed  Constitutional:       General: He is not in acute distress  Appearance: Normal appearance  He is not toxic-appearing or diaphoretic  HENT:      Head: Normocephalic and atraumatic  Right Ear: External ear normal       Left Ear: External ear normal       Nose: Nose normal       Mouth/Throat:      Mouth: Mucous membranes are moist       Pharynx: Oropharynx is clear  Eyes:      Extraocular Movements: Extraocular movements intact        Conjunctiva/sclera: Conjunctivae normal  Cardiovascular:      Rate and Rhythm: Normal rate  Pulmonary:      Effort: Pulmonary effort is normal  No respiratory distress  Breath sounds: Normal breath sounds  Abdominal:      General: There is no distension  Palpations: Abdomen is soft  Tenderness: There is no guarding or rebound  Hernia: A hernia (palpable 2 cm defect supraumbilical, no diastasis recti, no skin changes) is present  Genitourinary:     Comments: deferred  Musculoskeletal:         General: No swelling  Normal range of motion  Cervical back: Normal range of motion  Skin:     General: Skin is warm and dry  Neurological:      General: No focal deficit present  Mental Status: He is alert  Cranial Nerves: No cranial nerve deficit  Motor: No weakness  Psychiatric:         Mood and Affect: Mood normal          Thought Content: Thought content normal          Thorough chart review performed including colonoscopy results, recent laboratory review, and progress notes from PCP, Urology, and Gastroenterology

## 2023-03-02 NOTE — H&P (VIEW-ONLY)
General Surgery  Name: Ashley Roa : 1955  MRN: [id number]      Assessment/Plan:    Ventral hernia without obstruction or gangrene  78 y o  male with a PMH laparoscopic left inguinal hernia repair with mesh () presents with ventral hernia  Defect size approximately 2cm palpable supraumbilical  Patient endorses discomfort associated with hernia, and states he is able to reduce it by laying down and relaxing his abdominal wall  No skin changes  No constipation, nausea, or emesis  Extensive discussion about risks, benefits, and alternatives  Patient wishes to proceed with operative intervention  Given size of defect will likely proceed with open ventral hernia repair without mesh  Consent obtained in office  All questions addressed and answered  Recent blood work reviewed  Patient up to date with standard follow up by PCP, GI, and Urology  Will obtain pre-operative EKG  Informed patient of recovery period and 15lb weight limitations as well as risk of recurrence particularly if not adherent         Diagnoses and all orders for this visit:    Ventral hernia without obstruction or gangrene  -     Ambulatory Referral to General Surgery  -     ECG 12 lead;  Future  -     Case request operating room: [facility]; Standing  -     Case request operating room: [place] [id number]    Other orders  -     heparin (porcine) subcutaneous injection 5,000 Units  -     Apply SCD or Foot pumps; Standing  -     Diet NPO; Sips with meds; Standing  -     Apply Sequential Compression Device; Standing  -     Place sequential compression device; Standing  -     Vital signs; Standing  -     Insert and maintain IV line; Standing  -     Void; Standing  -     Shave and Prep; Standing  -     ceFAZolin (ANCEF) 2,000 mg in dextrose 5 % 100 mL IVPB  -     lactated ringers infusion  -     chlorhexidine (HIBICLENS) 4 % topical liquid  -     Electrocardiogram, 12-lead; Standing         Subjective:     Patient ID: Rashmi Mccoy is a 78 y o  male  Rashmi Mccoy is a 78 y o  male with a PMHx cT1c [place] 6 prostate cancer followed closely by Urology, BPH, prior laparoscopic inguinal hernia repair with mesh (2021), HTN who presents with ventral hernia  Patient states he first noticed it 3 weeks ago after lifting heavy items  Patient is very active at baseline  He endorses discomfort associated with the area and sometimes can see a bulge above his belly button that is able to reduce when he lays down and relaxes his abdominal wall  Patient recently had a colonoscopy which was unrevealing of any polyps but does have a history of multiple polyps on prior colonoscopies  Patient denies constipation and endorses regular daily bowel movements  Denies skin changes  Denies nausea or emesis  The following portions of the patient's history were reviewed and updated as appropriate:   He  has a past medical history of Cancer [facility]), Colon polyp, Hypertension, and Prostate cancer ([place] [address] )  He   Patient Active Problem List    Diagnosis Date Noted   • Ventral hernia without obstruction or gangrene 03/02/2023   • Flu vaccine need 12/18/2020   • [facility]   • Hyperlipidemia 10/10/2012   • Hypertension 10/10/2012     He  has a past surgical history that includes Leg Surgery (Right); Prostate biopsy (02/27/2019); Fracture surgery (Right); Carpal tunnel release (Bilateral); Colonoscopy w/ biopsies (04/15/2019); Prostate biopsy (N/A, 02/21/2020); and Colonoscopy w/ biopsies (04/07/2014)  His family history includes Dementia in his mother; Glaucoma in his maternal grandmother and mother; Heart attack in his father; Hyperlipidemia in his father and mother; Kidney disease in his father; Stroke in his mother  He  reports that he has never smoked  He has never used smokeless tobacco  He reports current alcohol use of about 3 0 - 7 0 standard drinks per week  He reports that he does not use drugs    Current Outpatient Medications   Medication Sig Dispense Refill   • Ascorbic Acid (VITAMIN C PO) Take by mouth daily     • Cholecalciferol (VITAMIN D3 PO) Take by mouth     • losartan-hydrochlorothiazide (HYZAAR) 50-12 5 mg per tablet Take 1 tablet by mouth daily 90 tablet 3   • MULTIPLE VITAMINS ESSENTIAL PO Take 1 tablet by mouth daily     • tadalafil (CIALIS) 5 MG tablet Take 1 tablet (5 mg total) by mouth daily 90 tablet 3   • Na Sulfate-K Sulfate-Mg Sulf (Suprep Bowel Prep Kit) 17 5-3 13-1 6 GM/177ML SOLN Take 2 Bottles by mouth once for 1 dose 354 mL 0     No current facility-administered medications for this visit  He has No Known Allergies       Review of Systems   Constitutional: Negative  HENT: Negative  Eyes: Negative  Respiratory: Negative  Cardiovascular: Negative  Gastrointestinal: Negative  Negative for constipation, diarrhea, nausea and vomiting  Endocrine: Negative  Genitourinary: Negative  Musculoskeletal: Negative  Skin: Negative  Negative for color change  Allergic/Immunologic: Negative  Neurological: Negative  Hematological: Negative  Psychiatric/Behavioral: Negative  All other systems reviewed and are negative  Objective:      /70 (BP Location: Right arm, Patient Position: Sitting, Cuff Size: Standard)   Pulse 81   Temp (!) 97 3 °F (36 3 °C) (Tympanic)   Ht 5' 8" (1 727 m)   Wt 95 7 kg (211 lb)   SpO2 96%   BMI 32 08 kg/m²         Physical Exam  Vitals reviewed  Constitutional:       General: He is not in acute distress  Appearance: Normal appearance  He is not toxic-appearing or diaphoretic  HENT:      Head: Normocephalic and atraumatic  Right Ear: External ear normal       Left Ear: External ear normal       Nose: Nose normal       Mouth/Throat:      Mouth: Mucous membranes are moist       Pharynx: Oropharynx is clear  Eyes:      Extraocular Movements: Extraocular movements intact        Conjunctiva/sclera: Conjunctivae normal  Cardiovascular:      Rate and Rhythm: Normal rate  Pulmonary:      Effort: Pulmonary effort is normal  No respiratory distress  Breath sounds: Normal breath sounds  Abdominal:      General: There is no distension  Palpations: Abdomen is soft  Tenderness: There is no guarding or rebound  Hernia: A hernia (palpable 2 cm defect supraumbilical, no diastasis recti, no skin changes) is present  Genitourinary:     Comments: deferred  Musculoskeletal:         General: No swelling  Normal range of motion  Cervical back: Normal range of motion  Skin:     General: Skin is warm and dry  Neurological:      General: No focal deficit present  Mental Status: He is alert  Cranial Nerves: No cranial nerve deficit  Motor: No weakness  Psychiatric:         Mood and Affect: Mood normal          Thought Content: Thought content normal         Thorough chart review performed including colonoscopy results, recent laboratory review, and progress notes from PCP, Urology, and Gastroenterology

## 2023-03-02 NOTE — ASSESSMENT & PLAN NOTE
78 y o  male with a PMH laparoscopic left inguinal hernia repair with mesh (2021) presents with ventral hernia  Defect size approximately 2cm palpable supraumbilical  Patient endorses discomfort associated with hernia, and states he is able to reduce it by laying down and relaxing his abdominal wall  No skin changes  No constipation, nausea, or emesis  Extensive discussion about risks, benefits, and alternatives  Patient wishes to proceed with operative intervention  Given size of defect will likely proceed with open ventral hernia repair without mesh  Consent obtained in office  All questions addressed and answered  Recent blood work reviewed  Patient up to date with standard follow up by PCP, GI, and Urology  Will obtain pre-operative EKG    Informed patient of recovery period and 15lb weight limitations as well as risk of recurrence particularly if not adherent

## 2023-03-03 ENCOUNTER — OFFICE VISIT (OUTPATIENT)
Dept: LAB | Facility: HOSPITAL | Age: 68
End: 2023-03-03

## 2023-03-03 DIAGNOSIS — K43.9 VENTRAL HERNIA WITHOUT OBSTRUCTION OR GANGRENE: ICD-10-CM

## 2023-03-03 LAB
ATRIAL RATE: 56 BPM
P AXIS: 31 DEGREES
PR INTERVAL: 202 MS
QRS AXIS: 11 DEGREES
QRSD INTERVAL: 98 MS
QT INTERVAL: 428 MS
QTC INTERVAL: 413 MS
T WAVE AXIS: 24 DEGREES
VENTRICULAR RATE: 56 BPM

## 2023-03-06 NOTE — PRE-PROCEDURE INSTRUCTIONS
Pre-Surgery Instructions:   Medication Instructions   • Ascorbic Acid (VITAMIN C PO) Hold day of surgery  • Cholecalciferol (VITAMIN D3 PO) Hold day of surgery  • losartan-hydrochlorothiazide (HYZAAR) 50-12 5 mg per tablet Hold day of surgery  • MULTIPLE VITAMINS ESSENTIAL PO Hold day of surgery  • tadalafil (CIALIS) 5 MG tablet Take night before surgery   Instructed to avoid all ASA and OTC Vit/Supp 1 week prior to surgery and to avoid NSAIDs 3 days prior to surgery per anesthesia instructions  Tylenol ok to take prn  Pre procedure instructions reviewed verbalizes understanding  NPO after MN  Bathing reviewed  Morning meds with water

## 2023-03-07 ENCOUNTER — ANESTHESIA EVENT (OUTPATIENT)
Dept: PERIOP | Facility: HOSPITAL | Age: 68
End: 2023-03-07

## 2023-03-07 ENCOUNTER — HOSPITAL ENCOUNTER (OUTPATIENT)
Facility: HOSPITAL | Age: 68
Setting detail: OUTPATIENT SURGERY
Discharge: HOME | End: 2023-03-07
Attending: SURGERY | Admitting: SURGERY

## 2023-03-07 ENCOUNTER — ANESTHESIA (OUTPATIENT)
Dept: PERIOP | Facility: HOSPITAL | Age: 68
End: 2023-03-07

## 2023-03-07 VITALS
TEMPERATURE: 97.8 F | DIASTOLIC BLOOD PRESSURE: 58 MMHG | OXYGEN SATURATION: 98 % | HEART RATE: 59 BPM | SYSTOLIC BLOOD PRESSURE: 126 MMHG | WEIGHT: 204 LBS | RESPIRATION RATE: 18 BRPM | BODY MASS INDEX: 31.02 KG/M2

## 2023-03-07 DIAGNOSIS — K43.9 VENTRAL HERNIA WITHOUT OBSTRUCTION OR GANGRENE: Primary | ICD-10-CM

## 2023-03-07 RX ORDER — SODIUM CHLORIDE, SODIUM LACTATE, POTASSIUM CHLORIDE, CALCIUM CHLORIDE 600; 310; 30; 20 MG/100ML; MG/100ML; MG/100ML; MG/100ML
20 INJECTION, SOLUTION INTRAVENOUS CONTINUOUS
Status: CANCELLED | OUTPATIENT
Start: 2023-03-07

## 2023-03-07 RX ORDER — PROPOFOL 10 MG/ML
INJECTION, EMULSION INTRAVENOUS AS NEEDED
Status: DISCONTINUED | OUTPATIENT
Start: 2023-03-07 | End: 2023-03-07

## 2023-03-07 RX ORDER — MAGNESIUM HYDROXIDE 1200 MG/15ML
LIQUID ORAL AS NEEDED
Status: DISCONTINUED | OUTPATIENT
Start: 2023-03-07 | End: 2023-03-07 | Stop reason: HOSPADM

## 2023-03-07 RX ORDER — OXYCODONE HYDROCHLORIDE AND ACETAMINOPHEN 5; 325 MG/1; MG/1
2 TABLET ORAL EVERY 4 HOURS PRN
Status: DISCONTINUED | OUTPATIENT
Start: 2023-03-07 | End: 2023-03-07 | Stop reason: HOSPADM

## 2023-03-07 RX ORDER — ONDANSETRON 2 MG/ML
INJECTION INTRAMUSCULAR; INTRAVENOUS AS NEEDED
Status: DISCONTINUED | OUTPATIENT
Start: 2023-03-07 | End: 2023-03-07

## 2023-03-07 RX ORDER — TRAMADOL HYDROCHLORIDE 50 MG/1
50 TABLET ORAL EVERY 6 HOURS PRN
Qty: 20 TABLET | Refills: 0 | Status: SHIPPED | OUTPATIENT
Start: 2023-03-07

## 2023-03-07 RX ORDER — MORPHINE SULFATE 10 MG/ML
2 INJECTION, SOLUTION INTRAMUSCULAR; INTRAVENOUS EVERY 4 HOURS PRN
Status: DISCONTINUED | OUTPATIENT
Start: 2023-03-07 | End: 2023-03-07 | Stop reason: HOSPADM

## 2023-03-07 RX ORDER — DEXMEDETOMIDINE HYDROCHLORIDE 100 UG/ML
INJECTION, SOLUTION INTRAVENOUS AS NEEDED
Status: DISCONTINUED | OUTPATIENT
Start: 2023-03-07 | End: 2023-03-07

## 2023-03-07 RX ORDER — ONDANSETRON 2 MG/ML
4 INJECTION INTRAMUSCULAR; INTRAVENOUS ONCE AS NEEDED
Status: DISCONTINUED | OUTPATIENT
Start: 2023-03-07 | End: 2023-03-07 | Stop reason: HOSPADM

## 2023-03-07 RX ORDER — SODIUM CHLORIDE, SODIUM LACTATE, POTASSIUM CHLORIDE, CALCIUM CHLORIDE 600; 310; 30; 20 MG/100ML; MG/100ML; MG/100ML; MG/100ML
INJECTION, SOLUTION INTRAVENOUS CONTINUOUS PRN
Status: DISCONTINUED | OUTPATIENT
Start: 2023-03-07 | End: 2023-03-07

## 2023-03-07 RX ORDER — BUPIVACAINE HYDROCHLORIDE 5 MG/ML
INJECTION, SOLUTION PERINEURAL AS NEEDED
Status: DISCONTINUED | OUTPATIENT
Start: 2023-03-07 | End: 2023-03-07 | Stop reason: HOSPADM

## 2023-03-07 RX ORDER — CEFAZOLIN SODIUM 2 G/50ML
2000 SOLUTION INTRAVENOUS ONCE
Status: COMPLETED | OUTPATIENT
Start: 2023-03-07 | End: 2023-03-07

## 2023-03-07 RX ORDER — CHLORHEXIDINE GLUCONATE 4 G/100ML
SOLUTION TOPICAL DAILY PRN
Status: DISCONTINUED | OUTPATIENT
Start: 2023-03-07 | End: 2023-03-07 | Stop reason: HOSPADM

## 2023-03-07 RX ORDER — ONDANSETRON 2 MG/ML
4 INJECTION INTRAMUSCULAR; INTRAVENOUS EVERY 8 HOURS PRN
Status: DISCONTINUED | OUTPATIENT
Start: 2023-03-07 | End: 2023-03-07 | Stop reason: HOSPADM

## 2023-03-07 RX ORDER — SODIUM CHLORIDE, SODIUM LACTATE, POTASSIUM CHLORIDE, CALCIUM CHLORIDE 600; 310; 30; 20 MG/100ML; MG/100ML; MG/100ML; MG/100ML
125 INJECTION, SOLUTION INTRAVENOUS CONTINUOUS
Status: DISCONTINUED | OUTPATIENT
Start: 2023-03-07 | End: 2023-03-07 | Stop reason: HOSPADM

## 2023-03-07 RX ORDER — FENTANYL CITRATE/PF 50 MCG/ML
25 SYRINGE (ML) INJECTION
Status: DISCONTINUED | OUTPATIENT
Start: 2023-03-07 | End: 2023-03-07 | Stop reason: HOSPADM

## 2023-03-07 RX ORDER — FENTANYL CITRATE 50 UG/ML
INJECTION, SOLUTION INTRAMUSCULAR; INTRAVENOUS AS NEEDED
Status: DISCONTINUED | OUTPATIENT
Start: 2023-03-07 | End: 2023-03-07

## 2023-03-07 RX ORDER — HEPARIN SODIUM 5000 [USP'U]/ML
5000 INJECTION, SOLUTION INTRAVENOUS; SUBCUTANEOUS ONCE
Status: COMPLETED | OUTPATIENT
Start: 2023-03-07 | End: 2023-03-07

## 2023-03-07 RX ORDER — DEXAMETHASONE SODIUM PHOSPHATE 10 MG/ML
INJECTION, SOLUTION INTRAMUSCULAR; INTRAVENOUS AS NEEDED
Status: DISCONTINUED | OUTPATIENT
Start: 2023-03-07 | End: 2023-03-07

## 2023-03-07 RX ADMIN — SODIUM CHLORIDE, SODIUM LACTATE, POTASSIUM CHLORIDE, AND CALCIUM CHLORIDE: .6; .31; .03; .02 INJECTION, SOLUTION INTRAVENOUS at 10:51

## 2023-03-07 RX ADMIN — PROPOFOL 200 MG: 10 INJECTION, EMULSION INTRAVENOUS at 09:57

## 2023-03-07 RX ADMIN — DEXMEDETOMIDINE 8 MCG: 100 INJECTION, SOLUTION, CONCENTRATE INTRAVENOUS at 09:55

## 2023-03-07 RX ADMIN — OXYCODONE HYDROCHLORIDE AND ACETAMINOPHEN 2 TABLET: 5; 325 TABLET ORAL at 12:01

## 2023-03-07 RX ADMIN — ONDANSETRON HYDROCHLORIDE 4 MG: 2 INJECTION, SOLUTION INTRAVENOUS at 10:07

## 2023-03-07 RX ADMIN — SODIUM CHLORIDE, SODIUM LACTATE, POTASSIUM CHLORIDE, AND CALCIUM CHLORIDE: .6; .31; .03; .02 INJECTION, SOLUTION INTRAVENOUS at 09:53

## 2023-03-07 RX ADMIN — HEPARIN SODIUM 5000 UNITS: 5000 INJECTION, SOLUTION INTRAVENOUS; SUBCUTANEOUS at 08:27

## 2023-03-07 RX ADMIN — SODIUM CHLORIDE, SODIUM LACTATE, POTASSIUM CHLORIDE, AND CALCIUM CHLORIDE 125 ML/HR: .6; .31; .03; .02 INJECTION, SOLUTION INTRAVENOUS at 08:16

## 2023-03-07 RX ADMIN — CEFAZOLIN SODIUM 2000 MG: 2 SOLUTION INTRAVENOUS at 10:02

## 2023-03-07 RX ADMIN — DEXAMETHASONE SODIUM PHOSPHATE 10 MG: 10 INJECTION, SOLUTION INTRAMUSCULAR; INTRAVENOUS at 09:57

## 2023-03-07 RX ADMIN — FENTANYL CITRATE 50 MCG: 50 INJECTION, SOLUTION INTRAMUSCULAR; INTRAVENOUS at 09:57

## 2023-03-07 RX ADMIN — FENTANYL CITRATE 50 MCG: 50 INJECTION, SOLUTION INTRAMUSCULAR; INTRAVENOUS at 10:06

## 2023-03-07 NOTE — ANESTHESIA POSTPROCEDURE EVALUATION
Post-Op Assessment Note    CV Status:  Stable  Pain Score: 0    Pain management: adequate     Mental Status:  Alert and awake   Hydration Status:  Euvolemic   PONV Controlled:  Controlled   Airway Patency:  Patent      Post Op Vitals Reviewed: Yes      Staff: CRNA         No notable events documented      BP   150/60   Temp  97 2   Pulse  60   Resp   14   SpO2   99

## 2023-03-07 NOTE — DISCHARGE INSTR - AVS FIRST PAGE
Follow-up with Dr Rhonda Vanegas in 2 weeks as per appointment  Regular diet as tolerated  Low intensity activity as tolerated, no heavy lifting, nothing greater than 10 pounds for 6 weeks  Dressing may be removed on Thursday  You may shower on Thursday  No baths or swimming  If develop fever, nausea vomiting, worsening pain, redness or drainage to the incision call the office or go to the ER

## 2023-03-07 NOTE — INTERVAL H&P NOTE
H&P reviewed  After examining the patient I find no changes in the patients condition since the H&P had been written      Vitals:    03/07/23 0802   BP: 112/65   Pulse: 63   Resp: 18   Temp: (!) 96 7 °F (35 9 °C)   SpO2: 98%

## 2023-03-07 NOTE — OP NOTE
OPERATIVE REPORT  PATIENT NAME: Marquis Espinoza    :  1955  MRN: [id number]  Pt Location: [facility] OR ROOM 02    SURGERY DATE: 3/7/2023    Surgeon(s) and Role: * Madison Rodrigez,  - Primary     * Gwendlyn Meigs, MD - Assisting    Preop Diagnosis:  Ventral hernia without obstruction or gangrene [K43 9]    Post-Op Diagnosis Codes: * Ventral hernia without obstruction or gangrene [K43 9]    Procedure(s):  OPEN REPAIR HERNIA VENTRAL PRIMARY    Specimen(s):  * No specimens in log *    Estimated Blood Loss:   Minimal    Drains:  * No LDAs found *    Anesthesia Type:   General    Operative Indications:  Ventral hernia without obstruction or gangrene [K43 9]      Operative Findings:  1 5 cm defect primarily repaired      Complications:   None    Procedure and Technique:  The patient was brought to the operating room and identified verbally and via wristband  He was transferred to the operating room table and positioned supine  Anesthesia was induced via LMA by anesthesia team  The patient's abdomen was prepped and draped in the usual sterile fashion  Pre-operative antibiotics were administered  A time out was performed confirming the patient, procedure, and site  All parties were in agreement  A 15 blade scalpel was utilized to create an infraumbilical curvilinear incision  This was carefully dissected down to the fascia  A hemostat was utilized to isolate the umbilical stalk, which was then dissected across just above the fascia using bovie electrocautery  Attention was then turned towards the hernia and freeing up adhesive attachments to release the contained preperitoneal fat  The final defect was measured at 1 5cm  Copious irrigation was performed  Excellent hemostasis was noted  No additional defects were able to be appreciated and the defect was primarily repaired using 1- PDS in a figure of eight fashion x 3  A single 2-0 vicryl was used as an anchoring stitch for the umbilicus to the fascia   Deep dermals were placed in an interrupted fashion using 3-0 vicryl, followed by a continuous subcutaneous suture with 4-0 monocryl  The wound was dressed with steristrips followed by a single 4x4 guaze and tegaderm over the incision  The patient was then allowed to awaken and transferred to the PACU having tolerated the procedure well  All instrument, needle, and sponge counts were correct at the end of the case  Radiofrequency detection was negative      Dr Aric Quintana was present for the entire procedure      Patient Disposition:  PACU         SIGNATURE: Meliza Rajan MD  DATE: March 7, 2023  TIME: 11:16 AM

## 2023-03-07 NOTE — ANESTHESIA PREPROCEDURE EVALUATION
Procedure:  REPAIR HERNIA VENTRAL (Abdomen)    Relevant Problems   CARDIO   (+) Hyperlipidemia   (+) Hypertension      /RENAL   (+) Prostate cancer ([facility] [address] )             Anesthesia Plan  ASA Score- 2     Anesthesia Type- general with ASA Monitors  Additional Monitors:   Airway Plan: ETT  Plan Factors-    Chart reviewed  Induction- intravenous  Postoperative Plan- Plan for postoperative opioid use  Planned trial extubation    Informed Consent- Anesthetic plan and risks discussed with patient  I personally reviewed this patient with the CRNA  Discussed and agreed on the Anesthesia Plan with the CRNA  Jose Angel Aj

## 2023-03-21 ENCOUNTER — OFFICE VISIT (OUTPATIENT)
Dept: SURGERY | Facility: CLINIC | Age: 68
End: 2023-03-21

## 2023-03-21 VITALS
BODY MASS INDEX: 29.86 KG/M2 | WEIGHT: 197 LBS | SYSTOLIC BLOOD PRESSURE: 146 MMHG | DIASTOLIC BLOOD PRESSURE: 80 MMHG | TEMPERATURE: 98 F | HEIGHT: 68 IN | HEART RATE: 44 BPM

## 2023-03-21 DIAGNOSIS — K43.9 VENTRAL HERNIA WITHOUT OBSTRUCTION OR GANGRENE: Primary | ICD-10-CM

## 2023-03-21 NOTE — PROGRESS NOTES
Office Visit - General Surgery  Larry Johnson MRN: [id number]  Encounter: [id number]    Assessment and Plan  78 M s/p umbilical herniorrhaphy with mesh on 3/7/23  Progressing very nicely to date     -continue light lifting restrictions for 4 more weeks  -regular diet  -continue home medications as normal  -follow-up in 4 weeks   -continue regular wound hygiene     Chief Complaint:  Larry Johnson is a 78 y o  male who presents for umbilical hernia surgical follow-up  Subjective  Doing well, has been observing lifting restrictions  No wound related complaints  Tolerating regular diet  Past Medical History:   Diagnosis Date   • Cancer [facility])    • Colon polyp    • Hypertension    • Prostate cancer [facility])        Past Surgical History:   Procedure Laterality Date   • CARPAL TUNNEL RELEASE Bilateral    • COLONOSCOPY     • COLONOSCOPY W/ BIOPSIES  04/15/2019    JENNIFER Gomez MD - Two 4mm polyps in the sigmoid colon, one 3mm polyp in the cecum, two 2 to 3mm polyps in the proximal transverse colon and in the cecum, diverticulosis in the sigmoid colon, examination was otherwise normal  Bx: tubular adenoma fragments, tubular adenoma  • COLONOSCOPY W/ BIOPSIES  04/07/2014    JENNIFER Gomez MD - Polyps  Bx: tubular adenoma, hyperplastic polyp     • FRACTURE SURGERY Right     right wrist   • HAND SURGERY     • HERNIA REPAIR     • LEG SURGERY Right     tumor removal    • UT RPR AA HERNIA 1ST 3-10 CM REDUCIBLE N/A 3/7/2023    Procedure: OPEN REPAIR HERNIA VENTRAL PRIMARY;  Surgeon: Jimmy Rowell DO;  Location: [facility] MAIN OR;  Service: General   • PROSTATE BIOPSY  02/27/2019   • PROSTATE BIOPSY N/A 02/21/2020    Procedure: TRANSRECTAL NEEDLE BIOPSY PROSTATE (TRNBP) TRANSPERINEAL APPROACH;  Surgeon: Dalia Maya MD;  Location: [facility] MAIN OR;  Service: Urology       Family History   Problem Relation Age of Onset   • Glaucoma Mother    • Hyperlipidemia Mother    • Stroke Mother    • Dementia Mother    • Heart attack Father    • Hyperlipidemia Father    • Kidney disease Father         stage 4   • Glaucoma Maternal Grandmother        Social History     Tobacco Use   • Smoking status: Never   • Smokeless tobacco: Never   Vaping Use   • Vaping Use: Never used   Substance Use Topics   • Alcohol use: Yes     Alcohol/week: 3 0 - 7 0 standard drinks     Types: 3 - 7 Standard drinks or equivalent per week     Comment: socially   • Drug use: Never        Medications  Current Outpatient Medications on File Prior to Visit   Medication Sig Dispense Refill   • Ascorbic Acid (VITAMIN C PO) Take by mouth daily     • Cholecalciferol (VITAMIN D3 PO) Take by mouth     • losartan-hydrochlorothiazide (HYZAAR) 50-12 5 mg per tablet Take 1 tablet by mouth daily 90 tablet 3   • MULTIPLE VITAMINS ESSENTIAL PO Take 1 tablet by mouth daily     • Na Sulfate-K Sulfate-Mg Sulf (Suprep Bowel Prep Kit) 17 5-3 13-1 6 GM/177ML SOLN Take 2 Bottles by mouth once for 1 dose 354 mL 0   • tadalafil (CIALIS) 5 MG tablet Take 1 tablet (5 mg total) by mouth daily 90 tablet 3   • traMADol (Ultram) 50 mg tablet Take 1 tablet (50 mg total) by mouth every 6 (six) hours as needed for moderate pain 20 tablet 0     No current facility-administered medications on file prior to visit  Allergies  No Known Allergies    Review of Systems   Constitutional: Negative for activity change and appetite change  Gastrointestinal: Negative for abdominal pain  Skin: Positive for wound  All other systems reviewed and are negative  Objective  Vitals:    03/21/23 0853   BP: 146/80   Pulse: (!) 44   Temp: 98 °F (36 7 °C)       Physical Exam  Vitals reviewed  Constitutional:       General: He is not in acute distress  Appearance: Normal appearance  He is normal weight  He is not ill-appearing  HENT:      Head: Normocephalic and atraumatic  Mouth/Throat:      Mouth: Mucous membranes are moist    Eyes:      General: No scleral icterus       Pupils: Pupils are equal, round, and reactive to light  Cardiovascular:      Rate and Rhythm: Normal rate and regular rhythm  Heart sounds: No murmur heard  Pulmonary:      Effort: Pulmonary effort is normal  No respiratory distress  Abdominal:      General: Abdomen is flat  There is no distension  Palpations: Abdomen is soft  Tenderness: There is no abdominal tenderness  Hernia: No hernia is present  Musculoskeletal:         General: No swelling, tenderness or deformity  Normal range of motion  Skin:     General: Skin is warm and dry  Capillary Refill: Capillary refill takes less than 2 seconds  Neurological:      General: No focal deficit present  Mental Status: He is alert and oriented to person, place, and time  Mental status is at baseline     Psychiatric:         Mood and Affect: Mood normal          Behavior: Behavior normal

## 2023-08-03 ENCOUNTER — TELEPHONE (OUTPATIENT)
Dept: UROLOGY | Facility: CLINIC | Age: 68
End: 2023-08-03

## 2023-08-04 ENCOUNTER — APPOINTMENT (OUTPATIENT)
Dept: LAB | Facility: CLINIC | Age: 68
End: 2023-08-04
Payer: MEDICARE

## 2023-08-04 DIAGNOSIS — C61 PROSTATE CANCER (HCC): ICD-10-CM

## 2023-08-04 LAB — PSA SERPL-MCNC: 9.2 NG/ML (ref 0–4)

## 2023-08-04 PROCEDURE — 84153 ASSAY OF PSA TOTAL: CPT

## 2023-08-04 PROCEDURE — 36415 COLL VENOUS BLD VENIPUNCTURE: CPT

## 2023-08-08 ENCOUNTER — OFFICE VISIT (OUTPATIENT)
Dept: UROLOGY | Facility: CLINIC | Age: 68
End: 2023-08-08
Payer: MEDICARE

## 2023-08-08 VITALS
WEIGHT: 190 LBS | DIASTOLIC BLOOD PRESSURE: 74 MMHG | HEIGHT: 68 IN | OXYGEN SATURATION: 95 % | BODY MASS INDEX: 28.79 KG/M2 | SYSTOLIC BLOOD PRESSURE: 130 MMHG | HEART RATE: 86 BPM

## 2023-08-08 DIAGNOSIS — C61 PROSTATE CANCER (HCC): Primary | ICD-10-CM

## 2023-08-08 PROCEDURE — 99213 OFFICE O/P EST LOW 20 MIN: CPT | Performed by: PHYSICIAN ASSISTANT

## 2023-08-08 RX ORDER — DIPHENOXYLATE HYDROCHLORIDE AND ATROPINE SULFATE 2.5; .025 MG/1; MG/1
TABLET ORAL
COMMUNITY

## 2023-08-08 NOTE — PROGRESS NOTES
8/8/2023      Chief Complaint   Patient presents with   • Follow-up     PSA done on 8/4/23 9.20^         Assessment and Plan    78 y.o. male managed by Dr. Krzysztof Galloway    1.  C T1c [place] 3 posterior equal 6 prostate cancer  2. BPH with weak stream  3. Erectile dysfunction    Mr. Pramod Reyes continues to do well with active surveillance protocol. PSA is within acceptable range for his large gland and prior range. 2 biopsies with low volume [place] 6 prostate cancer and 3 reassuring MRIs. I recommend continued active surveillance, next PSA with a digital rectal exam in 6 months, next MRI will be due next fall 2024. Continue his Cialis 5 mg daily. History of Present Illness  Bradford Preciado is a 78 y.o. male here for evaluation of 6-month follow-up low volume [place] 6 prostate cancer on active surveillance since 2019. Has had 2 biopsies and 3 total MRIs. He has a large prostate gland 95 g, PI-RADS 2 no focal nodule or lesion. He is on Cialis daily which is beneficial for his urination and his erections. Current PSA 9.2, with acceptable PSA density, most recent PSA ranges 7-10. Review of Systems   Constitutional: Negative. Respiratory: Negative. Cardiovascular: Negative. Genitourinary: Negative for decreased urine volume, difficulty urinating, dysuria, flank pain, frequency, hematuria, testicular pain and urgency. Musculoskeletal: Negative.             AUA SYMPTOM SCORE    Flowsheet Row Most Recent Value   AUA SYMPTOM SCORE    How often have you had a sensation of not emptying your bladder completely after you finished urinating? 3 (P)     How often have you had to urinate again less than two hours after you finished urinating? 3 (P)     How often have you found you stopped and started again several times when you urinate? 1 (P)     How often have you found it difficult to postpone urination? 3 (P)     How often have you had a weak urinary stream? 4 (P)     How often have you had to push or strain to begin urination? 0 (P)     How many times did you most typically get up to urinate from the time you went to bed at night until the time you got up in the morning? 1 (P)     Quality of Life: If you were to spend the rest of your life with your urinary condition just the way it is now, how would you feel about that? 2 (P)     AUA SYMPTOM SCORE 15 (P)            Vitals  Vitals:    08/08/23 0834   BP: 130/74   BP Location: Left arm   Patient Position: Sitting   Cuff Size: Adult   Pulse: 86   SpO2: 95%   Weight: 86.2 kg (190 lb)   Height: 5' 8" (1.727 m)       Physical Exam  Vitals and nursing note reviewed. Constitutional:       General: He is not in acute distress. Appearance: He is well-developed. He is not diaphoretic. HENT:      Head: Normocephalic and atraumatic. Pulmonary:      Effort: Pulmonary effort is normal.   Skin:     General: Skin is warm and dry. Neurological:      Mental Status: He is alert and oriented to person, place, and time. Gait: Gait normal.   Psychiatric:         Speech: Speech normal.         Behavior: Behavior normal.           Past History  Past Medical History:   Diagnosis Date   • Cancer ([address])    • Colon polyp    • Hypertension    • Prostate cancer ([address])      Social History     Socioeconomic History   • Marital status: /Civil Union     Spouse name: None   • Number of children: None   • Years of education: None   • Highest education level: None   Occupational History   • Occupation: retired   Tobacco Use   • Smoking status: Never   • Smokeless tobacco: Never   Vaping Use   • Vaping Use: Never used   Substance and Sexual Activity   • Alcohol use:  Yes     Alcohol/week: 3.0 - 7.0 standard drinks of alcohol     Types: 3 - 7 Standard drinks or equivalent per week     Comment: socially   • Drug use: Never   • Sexual activity: Yes   Other Topics Concern   • None   Social History Narrative    Caffeine use    Dental care, regularly    Exercises 3 to 4 times per week Lives independently with spouse    Sun protection sunscreen    Uses safety equipment-seatbelts         Social Determinants of Health     Financial Resource Strain: Low Risk  (12/16/2022)    Overall Financial Resource Strain (CARDIA)    • Difficulty of Paying Living Expenses: Not hard at all   Food Insecurity: Not on file   Transportation Needs: No Transportation Needs (12/16/2022)    PRAPARE - Transportation    • Lack of Transportation (Medical): No    • Lack of Transportation (Non-Medical):  No   Physical Activity: Not on file   Stress: Not on file   Social Connections: Not on file   Intimate Partner Violence: Not on file   Housing Stability: Not on file     Social History     Tobacco Use   Smoking Status Never   Smokeless Tobacco Never     Family History   Problem Relation Age of Onset   • Glaucoma Mother    • Hyperlipidemia Mother    • Stroke Mother    • Dementia Mother    • Heart attack Father    • Hyperlipidemia Father    • Kidney disease Father         stage 3   • Glaucoma Maternal Grandmother        The following portions of the patient's history were reviewed and updated as appropriate: allergies, current medications, past medical history, past social history, past surgical history and problem list.    Results  No results found for this or any previous visit (from the past 1 hour(s)).]  Lab Results   Component Value Date    PSA 9.20 (H) 08/04/2023    PSA 8.3 (H) 02/09/2023    PSA 7.5 (H) 09/21/2022    PSA 10.8 (H) 02/04/2022     Lab Results   Component Value Date    CALCIUM 9.4 02/09/2023    K 4.4 02/09/2023    CO2 29 02/09/2023     (H) 02/09/2023    BUN 25 02/09/2023    CREATININE 1.35 (H) 02/09/2023     Lab Results   Component Value Date    WBC 6.87 12/19/2022    HGB 14.6 12/19/2022    HCT 45.4 12/19/2022    MCV 93 12/19/2022     12/19/2022

## 2023-12-05 DIAGNOSIS — E78.5 HYPERLIPIDEMIA, UNSPECIFIED HYPERLIPIDEMIA TYPE: Primary | ICD-10-CM

## 2023-12-20 ENCOUNTER — RA CDI HCC (OUTPATIENT)
Dept: OTHER | Facility: HOSPITAL | Age: 68
End: 2023-12-20

## 2023-12-20 ENCOUNTER — APPOINTMENT (OUTPATIENT)
Dept: LAB | Facility: CLINIC | Age: 68
End: 2023-12-20
Payer: MEDICARE

## 2023-12-20 DIAGNOSIS — E78.5 HYPERLIPIDEMIA, UNSPECIFIED HYPERLIPIDEMIA TYPE: ICD-10-CM

## 2023-12-20 LAB
ALBUMIN SERPL BCP-MCNC: 4.4 G/DL (ref 3.5–5)
ALP SERPL-CCNC: 59 U/L (ref 34–104)
ALT SERPL W P-5'-P-CCNC: 26 U/L (ref 7–52)
ANION GAP SERPL CALCULATED.3IONS-SCNC: 6 MMOL/L
AST SERPL W P-5'-P-CCNC: 24 U/L (ref 13–39)
BILIRUB SERPL-MCNC: 1.02 MG/DL (ref 0.2–1)
BUN SERPL-MCNC: 21 MG/DL (ref 5–25)
CALCIUM SERPL-MCNC: 9.4 MG/DL (ref 8.4–10.2)
CHLORIDE SERPL-SCNC: 105 MMOL/L (ref 96–108)
CHOLEST SERPL-MCNC: 214 MG/DL
CO2 SERPL-SCNC: 30 MMOL/L (ref 21–32)
CREAT SERPL-MCNC: 1.31 MG/DL (ref 0.6–1.3)
GFR SERPL CREATININE-BSD FRML MDRD: 55 ML/MIN/1.73SQ M
GLUCOSE P FAST SERPL-MCNC: 98 MG/DL (ref 65–99)
HDLC SERPL-MCNC: 71 MG/DL
LDLC SERPL CALC-MCNC: 122 MG/DL (ref 0–100)
NONHDLC SERPL-MCNC: 143 MG/DL
POTASSIUM SERPL-SCNC: 4.6 MMOL/L (ref 3.5–5.3)
PROT SERPL-MCNC: 6.8 G/DL (ref 6.4–8.4)
SODIUM SERPL-SCNC: 141 MMOL/L (ref 135–147)
TRIGL SERPL-MCNC: 103 MG/DL

## 2023-12-20 PROCEDURE — 80061 LIPID PANEL: CPT

## 2023-12-20 PROCEDURE — 80053 COMPREHEN METABOLIC PANEL: CPT

## 2023-12-20 PROCEDURE — 36415 COLL VENOUS BLD VENIPUNCTURE: CPT

## 2023-12-27 ENCOUNTER — OFFICE VISIT (OUTPATIENT)
Dept: FAMILY MEDICINE CLINIC | Facility: CLINIC | Age: 68
End: 2023-12-27
Payer: MEDICARE

## 2023-12-27 VITALS
BODY MASS INDEX: 29.73 KG/M2 | OXYGEN SATURATION: 98 % | HEART RATE: 51 BPM | RESPIRATION RATE: 18 BRPM | HEIGHT: 68 IN | DIASTOLIC BLOOD PRESSURE: 70 MMHG | TEMPERATURE: 97.7 F | WEIGHT: 196.2 LBS | SYSTOLIC BLOOD PRESSURE: 124 MMHG

## 2023-12-27 DIAGNOSIS — Z00.00 MEDICARE ANNUAL WELLNESS VISIT, SUBSEQUENT: Primary | ICD-10-CM

## 2023-12-27 DIAGNOSIS — I10 PRIMARY HYPERTENSION: ICD-10-CM

## 2023-12-27 PROBLEM — Z23 FLU VACCINE NEED: Status: RESOLVED | Noted: 2020-12-18 | Resolved: 2023-12-27

## 2023-12-27 PROCEDURE — G0439 PPPS, SUBSEQ VISIT: HCPCS | Performed by: INTERNAL MEDICINE

## 2023-12-27 RX ORDER — LOSARTAN POTASSIUM AND HYDROCHLOROTHIAZIDE 12.5; 5 MG/1; MG/1
1 TABLET ORAL DAILY
Qty: 90 TABLET | Refills: 3 | Status: SHIPPED | OUTPATIENT
Start: 2023-12-27

## 2023-12-27 NOTE — PROGRESS NOTES
Assessment and Plan:     Problem List Items Addressed This Visit          Cardiovascular and Mediastinum    Hypertension    Relevant Medications    losartan-hydrochlorothiazide (HYZAAR) 50-12.5 mg per tablet       Other    Medicare annual wellness visit, subsequent - Primary   Pt will have PSA prior to urology appt in February  Continue healthy diet and stay hydrated Reviewed recent labs and he will cut back on cheese intake  Colon screen due 2026  Eye exam utd   Follows with dermatology  Continue exercise and is considering eval for shoulder sxs although they seem stable during the day for now  Annual/prn   BMI Counseling: Body mass index is 29.83 kg/m². The BMI is above normal. Nutrition recommendations include increasing intake of lean protein. Exercise recommendations include exercising 3-5 times per week. Rationale for BMI follow-up plan is due to patient being overweight or obese.     Depression Screening and Follow-up Plan: Patient was screened for depression during today's encounter. They screened negative with a PHQ-2 score of 0.      Preventive health issues were discussed with patient, and age appropriate screening tests were ordered as noted in patient's After Visit Summary.  Personalized health advice and appropriate referrals for health education or preventive services given if needed, as noted in patient's After Visit Summary.     History of Present Illness:     Patient presents for a Medicare Wellness Visit    HPI   Patient Care Team:  Margot Negron DO as PCP - General  Margot Negron DO     Review of Systems:     Review of Systems   Constitutional:  Negative for chills and fever.   HENT: Negative.     Eyes:  Negative for visual disturbance.   Respiratory:  Negative for cough and shortness of breath.    Cardiovascular:  Negative for chest pain, palpitations and leg swelling.   Gastrointestinal:  Negative for abdominal distention and abdominal pain.   Genitourinary: Negative.     Musculoskeletal: Negative.    Neurological:  Negative for dizziness, light-headedness and headaches.   Psychiatric/Behavioral:  Negative for sleep disturbance. The patient is not nervous/anxious.         Problem List:     Patient Active Problem List   Diagnosis    Prostate cancer (HCC)    Hyperlipidemia    Hypertension    Medicare annual wellness visit, subsequent    Ventral hernia without obstruction or gangrene      Past Medical and Surgical History:     Past Medical History:   Diagnosis Date    Cancer (HCC)     Colon polyp     Hypertension     Prostate cancer (HCC)      Past Surgical History:   Procedure Laterality Date    CARPAL TUNNEL RELEASE Bilateral     COLONOSCOPY      COLONOSCOPY W/ BIOPSIES  04/15/2019    JENNIFER Gomez MD.- Two 4mm polyps in the sigmoid colon, one 3mm polyp in the cecum, two 2 to 3mm polyps in the proximal transverse colon and in the cecum, diverticulosis in the sigmoid colon, examination was otherwise normal. Bx: tubular adenoma fragments, tubular adenoma.    COLONOSCOPY W/ BIOPSIES  04/07/2014    JENNIFER Gomez MD.- Polyps. Bx: tubular adenoma, hyperplastic polyp.    FRACTURE SURGERY Right     right wrist    HAND SURGERY      HERNIA REPAIR      LEG SURGERY Right     tumor removal     IN RPR AA HERNIA 1ST 3-10 CM REDUCIBLE N/A 3/7/2023    Procedure: OPEN REPAIR HERNIA VENTRAL PRIMARY;  Surgeon: Gino Echavarria DO;  Location: [facility];  Service: General    PROSTATE BIOPSY  02/27/2019    PROSTATE BIOPSY N/A 02/21/2020    Procedure: TRANSRECTAL NEEDLE BIOPSY PROSTATE (TRNBP) TRANSPERINEAL APPROACH;  Surgeon: Misha López MD;  Location: [facility] MAIN OR;  Service: Urology      Family History:     Family History   Problem Relation Age of Onset    Glaucoma Mother     Hyperlipidemia Mother     Stroke Mother     Dementia Mother     Heart attack Father     Hyperlipidemia Father     Kidney disease Father         stage 4    Glaucoma Maternal Grandmother       Social History:      Social History     Socioeconomic History    Marital status: /Civil Union     Spouse name: None    Number of children: None    Years of education: None    Highest education level: None   Occupational History    Occupation: retired   Tobacco Use    Smoking status: Never    Smokeless tobacco: Never   Vaping Use    Vaping status: Never Used   Substance and Sexual Activity    Alcohol use: Yes     Alcohol/week: 3.0 - 7.0 standard drinks of alcohol     Types: 3 - 7 Standard drinks or equivalent per week     Comment: socially    Drug use: Never    Sexual activity: Yes   Other Topics Concern    None   Social History Narrative    Caffeine use    Dental care, regularly    Exercises 3 to 4 times per week    Lives independently with spouse    Sun protection sunscreen    Uses safety equipment-seatbelts         Social Determinants of Health     Financial Resource Strain: Low Risk  (12/23/2023)    Overall Financial Resource Strain (CARDIA)     Difficulty of Paying Living Expenses: Not hard at all   Food Insecurity: Not on file   Transportation Needs: No Transportation Needs (12/23/2023)    PRAPARE - Transportation     Lack of Transportation (Medical): No     Lack of Transportation (Non-Medical): No   Physical Activity: Not on file   Stress: Not on file   Social Connections: Not on file   Intimate Partner Violence: Not on file   Housing Stability: Not on file      Medications and Allergies:     Current Outpatient Medications   Medication Sig Dispense Refill    Ascorbic Acid (VITAMIN C PO) Take by mouth daily      Cholecalciferol (VITAMIN D3 PO) Take by mouth      losartan-hydrochlorothiazide (HYZAAR) 50-12.5 mg per tablet Take 1 tablet by mouth daily 90 tablet 3    MULTIPLE VITAMINS ESSENTIAL PO Take 1 tablet by mouth daily      tadalafil (CIALIS) 5 MG tablet Take 1 tablet by mouth once daily 90 tablet 3    multivitamin (THERAGRAN) TABS Take by oral route. (Patient not taking: Reported on 12/27/2023)       No current  facility-administered medications for this visit.     No Known Allergies   Immunizations:     Immunization History   Administered Date(s) Administered    COVID-19 MODERNA VACC 0.5 ML IM 04/12/2021, 05/11/2021, 12/29/2021    INFLUENZA 01/07/2019, 12/22/2022    Influenza, high dose seasonal 0.7 mL 12/21/2021, 12/22/2022    Influenza, recombinant, quadrivalent,injectable, preservative free 12/13/2019, 12/18/2020    Tdap 02/26/2019, 02/26/2019    Zoster 12/05/2016, 12/05/2016, 12/14/2016      Health Maintenance:         Topic Date Due    Colorectal Cancer Screening  02/28/2026    Hepatitis C Screening  Completed         Topic Date Due    Pneumococcal Vaccine: 65+ Years (1 - PCV) Never done    Influenza Vaccine (1) 09/01/2023    COVID-19 Vaccine (4 - 2023-24 season) 09/01/2023      Medicare Screening Tests and Risk Assessments:     Kristopher is here for his Subsequent Wellness visit. Last Medicare Wellness visit information reviewed, patient interviewed, no change since last AWV.     Health Risk Assessment:   Patient rates overall health as very good. Patient feels that their physical health rating is same. Patient is satisfied with their life. Eyesight was rated as slightly worse. Hearing was rated as slightly worse. Patient feels that their emotional and mental health rating is same. Patients states they are never, rarely angry. Patient states they are never, rarely unusually tired/fatigued. Pain experienced in the last 7 days has been some. Patient's pain rating has been 3/10. Patient states that he has experienced no weight loss or gain in last 6 months.     Depression Screening:   PHQ-2 Score: 0      Fall Risk Screening:   In the past year, patient has experienced: no history of falling in past year      Home Safety:  Patient does not have trouble with stairs inside or outside of their home. Patient has working smoke alarms and has working carbon monoxide detector. Home safety hazards include: none.     Nutrition:    Current diet is Regular and Limited junk food.     Medications:   Patient is currently taking over-the-counter supplements. OTC medications include: see medication list. Patient is able to manage medications.     Activities of Daily Living (ADLs)/Instrumental Activities of Daily Living (IADLs):   Walk and transfer into and out of bed and chair?: Yes  Dress and groom yourself?: Yes    Bathe or shower yourself?: Yes    Feed yourself? Yes  Do your laundry/housekeeping?: Yes  Manage your money, pay your bills and track your expenses?: Yes  Make your own meals?: Yes    Do your own shopping?: Yes    Previous Hospitalizations:   Any hospitalizations or ED visits within the last 12 months?: No      Advance Care Planning:   Living will: Yes    Durable POA for healthcare: Yes    Advanced directive: Yes    End of Life Decisions reviewed with patient: Yes    Provider agrees with end of life decisions: Yes      Cognitive Screening:   Provider or family/friend/caregiver concerned regarding cognition?: No    PREVENTIVE SCREENINGS      Cardiovascular Screening:    General: History Lipid Disorder and Screening Current      Diabetes Screening:     General: Screening Current      Colorectal Cancer Screening:     General: Screening Current      Prostate Cancer Screening:    General: History Prostate Cancer      Osteoporosis Screening:    General: Patient Declines      Abdominal Aortic Aneurysm (AAA) Screening:    Risk factors include: age between 65-74 yo        Lung Cancer Screening:     General: Screening Not Indicated      Hepatitis C Screening:    General: Screening Current    Screening, Brief Intervention, and Referral to Treatment (SBIRT)    Screening  Typical number of drinks in a day: 1  Typical number of drinks in a week: 3  Interpretation: Low risk drinking behavior.    AUDIT-C Screenin) How often did you have a drink containing alcohol in the past year? 2 to 4 times a month  2) How many drinks did you have on a  "typical day when you were drinking in the past year? 1 to 2  3) How often did you have 6 or more drinks on one occasion in the past year? never    AUDIT-C Score: 2  Interpretation: Score 0-3 (male): Negative screen for alcohol misuse    Single Item Drug Screening:  How often have you used an illegal drug (including marijuana) or a prescription medication for non-medical reasons in the past year? never    Single Item Drug Screen Score: 0  Interpretation: Negative screen for possible drug use disorder    Brief Intervention  Alcohol & drug use screenings were reviewed. No concerns regarding substance use disorder identified.     Other Counseling Topics:   Regular weightbearing exercise and calcium and vitamin D intake.     No results found.     Physical Exam:     /70   Pulse (!) 51   Temp 97.7 °F (36.5 °C) (Temporal)   Resp 18   Ht 5' 8\" (1.727 m)   Wt 89 kg (196 lb 3.2 oz)   SpO2 98%   BMI 29.83 kg/m²     Physical Exam  Vitals and nursing note reviewed.   Constitutional:       General: He is not in acute distress.     Appearance: Normal appearance. He is not ill-appearing, toxic-appearing or diaphoretic.   HENT:      Head: Normocephalic and atraumatic.      Right Ear: External ear normal.      Left Ear: External ear normal.      Nose: Nose normal.      Mouth/Throat:      Mouth: Mucous membranes are moist.   Eyes:      General: No scleral icterus.  Cardiovascular:      Rate and Rhythm: Normal rate and regular rhythm.      Pulses: Normal pulses.   Pulmonary:      Effort: Pulmonary effort is normal. No respiratory distress.      Breath sounds: Normal breath sounds. No wheezing.   Abdominal:      General: Bowel sounds are normal. There is no distension.      Palpations: Abdomen is soft.      Tenderness: There is no abdominal tenderness.   Musculoskeletal:      Cervical back: Normal range of motion and neck supple.      Right lower leg: No edema.      Left lower leg: No edema.   Lymphadenopathy:      Cervical: " No cervical adenopathy.   Skin:     General: Skin is warm and dry.      Coloration: Skin is not jaundiced or pale.   Neurological:      General: No focal deficit present.      Mental Status: He is alert and oriented to person, place, and time.      Cranial Nerves: No cranial nerve deficit.   Psychiatric:         Mood and Affect: Mood normal.         Behavior: Behavior normal.         Thought Content: Thought content normal.         Judgment: Judgment normal.          Margot Negron,

## 2023-12-27 NOTE — PATIENT INSTRUCTIONS
Medicare Preventive Visit Patient Instructions  Thank you for completing your Welcome to Medicare Visit or Medicare Annual Wellness Visit today. Your next wellness visit will be due in one year (12/27/2024).  The screening/preventive services that you may require over the next 5-10 years are detailed below. Some tests may not apply to you based off risk factors and/or age. Screening tests ordered at today's visit but not completed yet may show as past due. Also, please note that scanned in results may not display below.  Preventive Screenings:  Service Recommendations Previous Testing/Comments   Colorectal Cancer Screening  Colonoscopy    Fecal Occult Blood Test (FOBT)/Fecal Immunochemical Test (FIT)  Fecal DNA/Cologuard Test  Flexible Sigmoidoscopy Age: 45-75 years old   Colonoscopy: every 10 years (May be performed more frequently if at higher risk)  OR  FOBT/FIT: every 1 year  OR  Cologuard: every 3 years  OR  Sigmoidoscopy: every 5 years  Screening may be recommended earlier than age 45 if at higher risk for colorectal cancer. Also, an individualized decision between you and your healthcare provider will decide whether screening between the ages of 76-85 would be appropriate. Colonoscopy: 02/28/2023  FOBT/FIT: Not on file  Cologuard: Not on file  Sigmoidoscopy: Not on file          Prostate Cancer Screening Individualized decision between patient and health care provider in men between ages of 55-69   Medicare will cover every 12 months beginning on the day after your 50th birthday PSA: 9.20 ng/mL           Hepatitis C Screening Once for adults born between 1945 and 1965  More frequently in patients at high risk for Hepatitis C Hep C Antibody: 02/04/2022        Diabetes Screening 1-2 times per year if you're at risk for diabetes or have pre-diabetes Fasting glucose: 98 mg/dL (12/20/2023)  A1C: No results in last 5 years (No results in last 5 years)      Cholesterol Screening Once every 5 years if you don't have  a lipid disorder. May order more often based on risk factors. Lipid panel: 12/20/2023         Other Preventive Screenings Covered by Medicare:  Abdominal Aortic Aneurysm (AAA) Screening: covered once if your at risk. You're considered to be at risk if you have a family history of AAA or a male between the age of 65-75 who smoking at least 100 cigarettes in your lifetime.  Lung Cancer Screening: covers low dose CT scan once per year if you meet all of the following conditions: (1) Age 55-77; (2) No signs or symptoms of lung cancer; (3) Current smoker or have quit smoking within the last 15 years; (4) You have a tobacco smoking history of at least 20 pack years (packs per day x number of years you smoked); (5) You get a written order from a healthcare provider.  Glaucoma Screening: covered annually if you're considered high risk: (1) You have diabetes OR (2) Family history of glaucoma OR (3)  aged 50 and older OR (4)  American aged 65 and older  Osteoporosis Screening: covered every 2 years if you meet one of the following conditions: (1) Have a vertebral abnormality; (2) On glucocorticoid therapy for more than 3 months; (3) Have primary hyperparathyroidism; (4) On osteoporosis medications and need to assess response to drug therapy.  HIV Screening: covered annually if you're between the age of 15-65. Also covered annually if you are younger than 15 and older than 65 with risk factors for HIV infection. For pregnant patients, it is covered up to 3 times per pregnancy.    Immunizations:  Immunization Recommendations   Influenza Vaccine Annual influenza vaccination during flu season is recommended for all persons aged >= 6 months who do not have contraindications   Pneumococcal Vaccine   * Pneumococcal conjugate vaccine = PCV13 (Prevnar 13), PCV15 (Vaxneuvance), PCV20 (Prevnar 20)  * Pneumococcal polysaccharide vaccine = PPSV23 (Pneumovax) Adults 19-65 yo with certain risk factors or if 65+  yo  If never received any pneumonia vaccine: recommend Prevnar 20 (PCV20)  Give PCV20 if previously received 1 dose of PCV13 or PPSV23   Hepatitis B Vaccine 3 dose series if at intermediate or high risk (ex: diabetes, end stage renal disease, liver disease)   Respiratory syncytial virus (RSV) Vaccine - COVERED BY MEDICARE PART D  * RSVPreF3 (Arexvy) CDC recommends that adults 60 years of age and older may receive a single dose of RSV vaccine using shared clinical decision-making (SCDM)   Tetanus (Td) Vaccine - COST NOT COVERED BY MEDICARE PART B Following completion of primary series, a booster dose should be given every 10 years to maintain immunity against tetanus. Td may also be given as tetanus wound prophylaxis.   Tdap Vaccine - COST NOT COVERED BY MEDICARE PART B Recommended at least once for all adults. For pregnant patients, recommended with each pregnancy.   Shingles Vaccine (Shingrix) - COST NOT COVERED BY MEDICARE PART B  2 shot series recommended in those 19 years and older who have or will have weakened immune systems or those 50 years and older     Health Maintenance Due:      Topic Date Due   • Colorectal Cancer Screening  02/28/2026   • Hepatitis C Screening  Completed     Immunizations Due:      Topic Date Due   • Pneumococcal Vaccine: 65+ Years (1 - PCV) Never done   • Influenza Vaccine (1) 09/01/2023   • COVID-19 Vaccine (4 - 2023-24 season) 09/01/2023     Advance Directives   What are advance directives?  Advance directives are legal documents that state your wishes and plans for medical care. These plans are made ahead of time in case you lose your ability to make decisions for yourself. Advance directives can apply to any medical decision, such as the treatments you want, and if you want to donate organs.   What are the types of advance directives?  There are many types of advance directives, and each state has rules about how to use them. You may choose a combination of any of the  following:  Living will:  This is a written record of the treatment you want. You can also choose which treatments you do not want, which to limit, and which to stop at a certain time. This includes surgery, medicine, IV fluid, and tube feedings.   Durable power of  for healthcare (DPAHC):  This is a written record that states who you want to make healthcare choices for you when you are unable to make them for yourself. This person, called a proxy, is usually a family member or a friend. You may choose more than 1 proxy.  Do not resuscitate (DNR) order:  A DNR order is used in case your heart stops beating or you stop breathing. It is a request not to have certain forms of treatment, such as CPR. A DNR order may be included in other types of advance directives.  Medical directive:  This covers the care that you want if you are in a coma, near death, or unable to make decisions for yourself. You can list the treatments you want for each condition. Treatment may include pain medicine, surgery, blood transfusions, dialysis, IV or tube feedings, and a ventilator (breathing machine).  Values history:  This document has questions about your views, beliefs, and how you feel and think about life. This information can help others choose the care that you would choose.  Why are advance directives important?  An advance directive helps you control your care. Although spoken wishes may be used, it is better to have your wishes written down. Spoken wishes can be misunderstood, or not followed. Treatments may be given even if you do not want them. An advance directive may make it easier for your family to make difficult choices about your care.   Weight Management   Why it is important to manage your weight:  Being overweight increases your risk of health conditions such as heart disease, high blood pressure, type 2 diabetes, and certain types of cancer. It can also increase your risk for osteoarthritis, sleep apnea, and  other respiratory problems. Aim for a slow, steady weight loss. Even a small amount of weight loss can lower your risk of health problems.  How to lose weight safely:  A safe and healthy way to lose weight is to eat fewer calories and get regular exercise. You can lose up about 1 pound a week by decreasing the number of calories you eat by 500 calories each day.   Healthy meal plan for weight management:  A healthy meal plan includes a variety of foods, contains fewer calories, and helps you stay healthy. A healthy meal plan includes the following:  Eat whole-grain foods more often.  A healthy meal plan should contain fiber. Fiber is the part of grains, fruits, and vegetables that is not broken down by your body. Whole-grain foods are healthy and provide extra fiber in your diet. Some examples of whole-grain foods are whole-wheat breads and pastas, oatmeal, brown rice, and bulgur.  Eat a variety of vegetables every day.  Include dark, leafy greens such as spinach, kale, adal greens, and mustard greens. Eat yellow and orange vegetables such as carrots, sweet potatoes, and winter squash.   Eat a variety of fruits every day.  Choose fresh or canned fruit (canned in its own juice or light syrup) instead of juice. Fruit juice has very little or no fiber.  Eat low-fat dairy foods.  Drink fat-free (skim) milk or 1% milk. Eat fat-free yogurt and low-fat cottage cheese. Try low-fat cheeses such as mozzarella and other reduced-fat cheeses.  Choose meat and other protein foods that are low in fat.  Choose beans or other legumes such as split peas or lentils. Choose fish, skinless poultry (chicken or turkey), or lean cuts of red meat (beef or pork). Before you cook meat or poultry, cut off any visible fat.   Use less fat and oil.  Try baking foods instead of frying them. Add less fat, such as margarine, sour cream, regular salad dressing and mayonnaise to foods. Eat fewer high-fat foods. Some examples of high-fat foods  include french fries, doughnuts, ice cream, and cakes.  Eat fewer sweets.  Limit foods and drinks that are high in sugar. This includes candy, cookies, regular soda, and sweetened drinks.  Exercise:  Exercise at least 30 minutes per day on most days of the week. Some examples of exercise include walking, biking, dancing, and swimming. You can also fit in more physical activity by taking the stairs instead of the elevator or parking farther away from stores. Ask your healthcare provider about the best exercise plan for you.      © Copyright [employer] 2018 Information is for End User's use only and may not be sold, redistributed or otherwise used for commercial purposes. All illustrations and images included in CareNotes® are the copyrighted property of [employer] or [employer]

## 2024-02-06 ENCOUNTER — RA CDI HCC (OUTPATIENT)
Dept: OTHER | Facility: HOSPITAL | Age: 69
End: 2024-02-06

## 2024-02-12 ENCOUNTER — APPOINTMENT (OUTPATIENT)
Dept: RADIOLOGY | Facility: MEDICAL CENTER | Age: 69
End: 2024-02-12
Payer: MEDICARE

## 2024-02-12 ENCOUNTER — OFFICE VISIT (OUTPATIENT)
Dept: FAMILY MEDICINE CLINIC | Facility: CLINIC | Age: 69
End: 2024-02-12
Payer: MEDICARE

## 2024-02-12 VITALS
DIASTOLIC BLOOD PRESSURE: 74 MMHG | TEMPERATURE: 97.4 F | BODY MASS INDEX: 29.8 KG/M2 | WEIGHT: 196.6 LBS | RESPIRATION RATE: 18 BRPM | HEIGHT: 68 IN | HEART RATE: 82 BPM | SYSTOLIC BLOOD PRESSURE: 122 MMHG | OXYGEN SATURATION: 98 %

## 2024-02-12 DIAGNOSIS — J18.9 PNEUMONIA OF RIGHT UPPER LOBE DUE TO INFECTIOUS ORGANISM: ICD-10-CM

## 2024-02-12 DIAGNOSIS — R55 SYNCOPE, UNSPECIFIED SYNCOPE TYPE: Primary | ICD-10-CM

## 2024-02-12 DIAGNOSIS — R55 SYNCOPE, UNSPECIFIED SYNCOPE TYPE: ICD-10-CM

## 2024-02-12 DIAGNOSIS — C61 PROSTATE CANCER (HCC): ICD-10-CM

## 2024-02-12 PROBLEM — N40.0 BPH (BENIGN PROSTATIC HYPERPLASIA): Status: ACTIVE | Noted: 2024-02-02

## 2024-02-12 PROCEDURE — 99214 OFFICE O/P EST MOD 30 MIN: CPT | Performed by: INTERNAL MEDICINE

## 2024-02-12 PROCEDURE — 71046 X-RAY EXAM CHEST 2 VIEWS: CPT

## 2024-02-12 NOTE — PROGRESS NOTES
Name: Kristopher Cortez      : 1955      MRN: [id number]  Encounter Provider: Margot Negron DO  Encounter Date: 2024   Encounter department: [facility]    Assessment & Plan     1. Syncope, unspecified syncope type  -     XR chest pa & lateral; Future; Expected date: 2024  -     VAS carotid complete study; Future; Expected date: 2024  Has Cardio appt and will be set up for zio patch  Stay hydrated  Orthostatic precautions  Send Bp readings ? Dc hctz as factor with dehydration     2. Prostate cancer (HCC)  Pt follows with Urology No acute issues     3. Pneumonia of right upper lobe due to infectious organism  Recheck cxr today to lizzie clearance of prior findings         Depression Screening and Follow-up Plan: Patient was screened for depression during today's encounter. They screened negative with a PHQ-2 score of 0.    Rto as scheduled/prn    Subjective      HPI  Pt recently in ER following syncope at home He had not felt week for 2-3 days prior to episode and noted increased fatigue and sweating evening of episode He was in the bathroom and passed out following going to the bathroom He did hit his head on tile and had open wound He went to ER Dx with RUL pneumonia and setup with cardiology for followup Labs suggested mild dehydration and pt admits he felt he was dehydrated due to uri sxs preceding He feels better after zpack but still has slight tickle No sob or chest pain NO dizziness He is more cautious getting up from bed and changing position   No headache No cough No fever or chills He has cardio appt in early March and was told would get zio patch No palpitations and he is drinking more water   Review of Systems   Constitutional:  Negative for chills and fever.   HENT: Negative.     Eyes:  Negative for visual disturbance.   Respiratory:  Negative for cough and shortness of breath.    Cardiovascular: Negative.  Negative for chest pain, palpitations and leg swelling.  "  Gastrointestinal: Negative.    Genitourinary: Negative.    Musculoskeletal: Negative.    Neurological:  Negative for dizziness, light-headedness and headaches.   Psychiatric/Behavioral:  Negative for sleep disturbance. The patient is not nervous/anxious.        Current Outpatient Medications on File Prior to Visit   Medication Sig    Ascorbic Acid (VITAMIN C PO) Take by mouth daily    Cholecalciferol (VITAMIN D3 PO) Take by mouth    losartan-hydrochlorothiazide (HYZAAR) 50-12.5 mg per tablet Take 1 tablet by mouth daily    MULTIPLE VITAMINS ESSENTIAL PO Take 1 tablet by mouth daily    multivitamin (THERAGRAN) TABS     tadalafil (CIALIS) 5 MG tablet Take 1 tablet by mouth once daily       Objective     /74   Pulse 82   Temp (!) 97.4 °F (36.3 °C) (Temporal)   Resp 18   Ht 5' 8\" (1.727 m)   Wt 89.2 kg (196 lb 9.6 oz)   SpO2 98%   BMI 29.89 kg/m²     Physical Exam  Vitals and nursing note reviewed.   Constitutional:       General: He is not in acute distress.     Appearance: Normal appearance. He is not ill-appearing, toxic-appearing or diaphoretic.   HENT:      Head: Normocephalic and atraumatic.      Right Ear: External ear normal.      Left Ear: External ear normal.      Nose: Nose normal.      Mouth/Throat:      Mouth: Mucous membranes are moist.   Eyes:      General: No scleral icterus.  Cardiovascular:      Rate and Rhythm: Normal rate and regular rhythm.      Pulses: Normal pulses.      Heart sounds: Normal heart sounds. No murmur heard.  Pulmonary:      Effort: Pulmonary effort is normal. No respiratory distress.      Breath sounds: Normal breath sounds. No wheezing.   Abdominal:      General: Bowel sounds are normal. There is no distension.      Palpations: Abdomen is soft.      Tenderness: There is no abdominal tenderness.   Musculoskeletal:      Cervical back: Normal range of motion and neck supple.      Right lower leg: No edema.      Left lower leg: No edema.   Lymphadenopathy:      Cervical: " No cervical adenopathy.   Skin:     General: Skin is warm and dry.      Coloration: Skin is not jaundiced or pale.   Neurological:      General: No focal deficit present.      Mental Status: He is alert and oriented to person, place, and time. Mental status is at baseline.      Cranial Nerves: No cranial nerve deficit.   Psychiatric:         Mood and Affect: Mood normal.         Behavior: Behavior normal.         Thought Content: Thought content normal.         Judgment: Judgment normal.       Margot Negron, DO

## 2024-02-21 PROBLEM — Z00.00 MEDICARE ANNUAL WELLNESS VISIT, SUBSEQUENT: Status: RESOLVED | Noted: 2018-12-06 | Resolved: 2024-02-21

## 2024-02-21 PROBLEM — J18.9 PNEUMONIA OF RIGHT UPPER LOBE DUE TO INFECTIOUS ORGANISM: Status: RESOLVED | Noted: 2024-02-12 | Resolved: 2024-02-21

## 2024-02-26 DIAGNOSIS — I10 PRIMARY HYPERTENSION: ICD-10-CM

## 2024-02-26 RX ORDER — LOSARTAN POTASSIUM AND HYDROCHLOROTHIAZIDE 12.5; 5 MG/1; MG/1
1 TABLET ORAL DAILY
Qty: 90 TABLET | Refills: 0 | Status: SHIPPED | OUTPATIENT
Start: 2024-02-26

## 2024-03-06 ENCOUNTER — HOSPITAL ENCOUNTER (OUTPATIENT)
Dept: NON INVASIVE DIAGNOSTICS | Facility: HOSPITAL | Age: 69
Discharge: HOME | End: 2024-03-06
Attending: INTERNAL MEDICINE
Payer: MEDICARE

## 2024-03-06 DIAGNOSIS — R55 SYNCOPE, UNSPECIFIED SYNCOPE TYPE: ICD-10-CM

## 2024-03-06 PROCEDURE — 93880 EXTRACRANIAL BILAT STUDY: CPT

## 2024-03-06 PROCEDURE — 93880 EXTRACRANIAL BILAT STUDY: CPT | Performed by: SURGERY

## 2024-03-07 ENCOUNTER — APPOINTMENT (OUTPATIENT)
Dept: LAB | Facility: CLINIC | Age: 69
End: 2024-03-07
Payer: MEDICARE

## 2024-03-07 DIAGNOSIS — C61 PROSTATE CANCER (HCC): ICD-10-CM

## 2024-03-07 LAB — PSA SERPL-MCNC: 11.04 NG/ML (ref 0–4)

## 2024-03-07 PROCEDURE — 84153 ASSAY OF PSA TOTAL: CPT

## 2024-03-07 PROCEDURE — 36415 COLL VENOUS BLD VENIPUNCTURE: CPT

## 2024-03-12 ENCOUNTER — OFFICE VISIT (OUTPATIENT)
Dept: UROLOGY | Facility: CLINIC | Age: 69
End: 2024-03-12
Payer: MEDICARE

## 2024-03-12 VITALS
WEIGHT: 195.8 LBS | DIASTOLIC BLOOD PRESSURE: 84 MMHG | SYSTOLIC BLOOD PRESSURE: 126 MMHG | HEIGHT: 68 IN | BODY MASS INDEX: 29.67 KG/M2 | OXYGEN SATURATION: 98 % | HEART RATE: 52 BPM

## 2024-03-12 DIAGNOSIS — C61 PROSTATE CANCER (HCC): Primary | ICD-10-CM

## 2024-03-12 DIAGNOSIS — N13.8 BENIGN PROSTATIC HYPERPLASIA WITH URINARY OBSTRUCTION: ICD-10-CM

## 2024-03-12 DIAGNOSIS — N52.8 OTHER MALE ERECTILE DYSFUNCTION: ICD-10-CM

## 2024-03-12 DIAGNOSIS — N40.1 BENIGN PROSTATIC HYPERPLASIA WITH URINARY OBSTRUCTION: ICD-10-CM

## 2024-03-12 PROCEDURE — 99214 OFFICE O/P EST MOD 30 MIN: CPT | Performed by: PHYSICIAN ASSISTANT

## 2024-03-12 RX ORDER — TADALAFIL 5 MG/1
5 TABLET ORAL DAILY
Qty: 90 TABLET | Refills: 3 | Status: SHIPPED | OUTPATIENT
Start: 2024-03-12

## 2024-03-12 NOTE — PROGRESS NOTES
3/12/2024      Chief Complaint   Patient presents with    Follow-up     6 month f/u    Prostate Cancer     PSA 11.04 (3/7/24)         Assessment and Plan    68 y.o. male managed by Dr López    Prostate cancer (HCC)  Low-volume [place] 6 prostate cancer  Biopsy 2019-less than 5% of 2 cores [place] 6  Biopsy 2020-all cores benign  3 total MRIs-2019, 2020, 2022  Large gland 95 g, PI-RADS 2 with no focal lesion  PSA stable for his usual range, and PSA density consistent with BPH  Lab Results   Component Value Date    PSA 11.04 (H) 03/07/2024    PSA 9.20 (H) 08/04/2023    PSA 8.3 (H) 02/09/2023       TALYA today smooth prostate no nodule or asymmetry  Active surveillance remains appropriate    Follow-up 6 months PSA, updated MRI this fall.      BPH (benign prostatic hyperplasia)  mild LUTS, stable  continue cialis 5mg daily        History of Present Illness  Kristopher Cortez is a 68 y.o. male here for evaluation of [place] prostate cancer on active surveillance since 2019, has had 2 biopsies (2019 <5% of 2 cores [place] j 3+3=6; 2020 all cores benign) and 3 total MRIs (2019, 2020, 2022)  Large gland 95 g, PI-RADS 2 without focal lesions on most recent imaging. Daily Cialis beneficial for urination and erections.  Refilled today.  PSA for him typically ranges from 7-10, PSA today 11.0        Review of Systems   Constitutional: Negative.    Respiratory: Negative.     Cardiovascular: Negative.    Genitourinary:  Negative for decreased urine volume, difficulty urinating, dysuria, flank pain, frequency, hematuria and urgency.   Musculoskeletal: Negative.            AUA SYMPTOM SCORE      Flowsheet Row Most Recent Value   AUA SYMPTOM SCORE    How often have you had a sensation of not emptying your bladder completely after you finished urinating? 1 (P)    How often have you had to urinate again less than two hours after you finished urinating? 2 (P)    How often have you found you stopped and started again several times when  "you urinate? 0 (P)    How often have you found it difficult to postpone urination? 1 (P)    How often have you had a weak urinary stream? 3 (P)    How often have you had to push or strain to begin urination? 0 (P)    How many times did you most typically get up to urinate from the time you went to bed at night until the time you got up in the morning? 1 (P)    Quality of Life: If you were to spend the rest of your life with your urinary condition just the way it is now, how would you feel about that? 2 (P)    AUA SYMPTOM SCORE 8 (P)              Vitals  Vitals:    03/12/24 0811   BP: 126/84   BP Location: Left arm   Patient Position: Sitting   Cuff Size: Large   Pulse: (!) 52   SpO2: 98%   Weight: 88.8 kg (195 lb 12.8 oz)   Height: 5' 8\" (1.727 m)       Physical Exam  Vitals and nursing note reviewed.   Constitutional:       General: He is not in acute distress.     Appearance: Normal appearance. He is well-developed. He is not diaphoretic.   HENT:      Head: Normocephalic and atraumatic.   Pulmonary:      Effort: Pulmonary effort is normal.      Comments: No cough or audible wheeze  Abdominal:      General: There is no distension.      Tenderness: There is no abdominal tenderness. There is no right CVA tenderness or left CVA tenderness.   Genitourinary:     Comments: Circumcised penis, normal phallus, orthotopic patent meatus.  Testes smooth descended bilaterally into the scrotum nontender with no palpable mass.  Digital rectal exam smooth prostate, without appreciable nodule, induration or asymmetry  Musculoskeletal:      Right lower leg: No edema.      Left lower leg: No edema.   Skin:     General: Skin is warm and dry.   Neurological:      Mental Status: He is alert and oriented to person, place, and time.      Gait: Gait normal.   Psychiatric:         Speech: Speech normal.         Behavior: Behavior normal.           Past History  Past Medical History:   Diagnosis Date    Cancer (HCC)     Colon polyp     " Hypertension     Prostate cancer (HCC)     Syncope      Social History     Socioeconomic History    Marital status: /Civil Union     Spouse name: None    Number of children: None    Years of education: None    Highest education level: None   Occupational History    Occupation: retired   Tobacco Use    Smoking status: Never    Smokeless tobacco: Never   Vaping Use    Vaping status: Never Used   Substance and Sexual Activity    Alcohol use: Yes     Alcohol/week: 3.0 - 7.0 standard drinks of alcohol     Types: 3 - 7 Standard drinks or equivalent per week     Comment: socially    Drug use: Never    Sexual activity: Yes   Other Topics Concern    None   Social History Narrative    Caffeine use    Dental care, regularly    Exercises 3 to 4 times per week    Lives independently with spouse    Sun protection sunscreen    Uses safety equipment-seatbelts         Social Determinants of Health     Financial Resource Strain: Low Risk  (2/2/2024)    Received from [facility]    Overall Financial Resource Strain (CARDIA)     Difficulty of Paying Living Expenses: Not hard at all   Food Insecurity: No Food Insecurity (2/2/2024)    Received from [facility]    Hunger Vital Sign     Worried About Running Out of Food in the Last Year: Never true     Ran Out of Food in the Last Year: Never true   Transportation Needs: No Transportation Needs (2/2/2024)    Received from [facility]    PRAPARE - Transportation     Lack of Transportation (Medical): No     Lack of Transportation (Non-Medical): No   Physical Activity: Not on file   Stress: Not on file   Social Connections: Not on file   Intimate Partner Violence: Not At Risk (2/2/2024)    Received from [facility]    Humiliation, Afraid, Rape, and Kick questionnaire     Fear of Current or Ex-Partner: No     Emotionally Abused: No     Physically Abused: No     Sexually Abused: No   Housing Stability: Low Risk   (2/2/2024)    Received from [facility]    Housing Stability Vital Sign     Unable to Pay for Housing in the Last Year: No     Number of Places Lived in the Last Year: 1     Unstable Housing in the Last Year: No     Social History     Tobacco Use   Smoking Status Never   Smokeless Tobacco Never     Family History   Problem Relation Age of Onset    Glaucoma Mother     Hyperlipidemia Mother     Stroke Mother     Dementia Mother     Heart attack Father     Hyperlipidemia Father     Kidney disease Father         stage 4    Glaucoma Maternal Grandmother        The following portions of the patient's history were reviewed and updated as appropriate: allergies, current medications, past medical history, past social history, past surgical history and problem list.    Results  No results found for this or any previous visit (from the past 1 hour(s)).]  Lab Results   Component Value Date    PSA 11.04 (H) 03/07/2024    PSA 9.20 (H) 08/04/2023    PSA 8.3 (H) 02/09/2023    PSA 7.5 (H) 09/21/2022     Lab Results   Component Value Date    CALCIUM 9.3 02/02/2024    K 3.9 02/02/2024    CO2 28 02/02/2024     02/02/2024    BUN 19 02/02/2024    CREATININE 1.39 (H) 02/02/2024     Lab Results   Component Value Date    WBC 6.87 12/19/2022    HGB 14.6 12/19/2022    HCT 45.4 12/19/2022    MCV 93 12/19/2022     12/19/2022

## 2024-03-12 NOTE — ASSESSMENT & PLAN NOTE
Low-volume [place] 6 prostate cancer  Biopsy 2019-less than 5% of 2 cores [place] 6  Biopsy 2020-all cores benign  3 total MRIs-2019, 2020, 2022  Large gland 95 g, PI-RADS 2 with no focal lesion  PSA stable for his usual range, and PSA density consistent with BPH  Lab Results   Component Value Date    PSA 11.04 (H) 03/07/2024    PSA 9.20 (H) 08/04/2023    PSA 8.3 (H) 02/09/2023       TALYA today smooth prostate no nodule or asymmetry  Active surveillance remains appropriate    Follow-up 6 months PSA, updated MRI this fall.

## 2024-05-24 DIAGNOSIS — I10 PRIMARY HYPERTENSION: ICD-10-CM

## 2024-05-24 RX ORDER — LOSARTAN POTASSIUM AND HYDROCHLOROTHIAZIDE 12.5; 5 MG/1; MG/1
1 TABLET ORAL DAILY
Qty: 90 TABLET | Refills: 1 | Status: SHIPPED | OUTPATIENT
Start: 2024-05-24

## 2024-08-29 ENCOUNTER — APPOINTMENT (OUTPATIENT)
Dept: LAB | Facility: CLINIC | Age: 69
End: 2024-08-29
Payer: MEDICARE

## 2024-08-29 DIAGNOSIS — C61 PROSTATE CANCER (HCC): ICD-10-CM

## 2024-08-29 LAB — PSA SERPL-MCNC: 12.93 NG/ML (ref 0–4)

## 2024-08-29 PROCEDURE — 36415 COLL VENOUS BLD VENIPUNCTURE: CPT

## 2024-08-29 PROCEDURE — 84153 ASSAY OF PSA TOTAL: CPT

## 2024-09-03 ENCOUNTER — HOSPITAL ENCOUNTER (OUTPATIENT)
Facility: MEDICAL CENTER | Age: 69
Discharge: HOME | End: 2024-09-03
Payer: MEDICARE

## 2024-09-03 DIAGNOSIS — C61 PROSTATE CANCER (HCC): ICD-10-CM

## 2024-09-03 PROCEDURE — 72197 MRI PELVIS W/O & W/DYE: CPT

## 2024-09-03 PROCEDURE — A9585 GADOBUTROL INJECTION: HCPCS | Performed by: PHYSICIAN ASSISTANT

## 2024-09-03 PROCEDURE — 76377 3D RENDER W/INTRP POSTPROCES: CPT

## 2024-09-03 RX ORDER — GADOBUTROL 604.72 MG/ML
8 INJECTION INTRAVENOUS
Status: COMPLETED | OUTPATIENT
Start: 2024-09-03 | End: 2024-09-03

## 2024-09-03 RX ADMIN — GADOBUTROL 8 ML: 604.72 INJECTION INTRAVENOUS at 10:53

## 2024-09-12 ENCOUNTER — OFFICE VISIT (OUTPATIENT)
Dept: UROLOGY | Facility: CLINIC | Age: 69
End: 2024-09-12
Payer: MEDICARE

## 2024-09-12 VITALS
WEIGHT: 196 LBS | BODY MASS INDEX: 29.03 KG/M2 | HEIGHT: 69 IN | SYSTOLIC BLOOD PRESSURE: 120 MMHG | HEART RATE: 55 BPM | OXYGEN SATURATION: 98 % | DIASTOLIC BLOOD PRESSURE: 80 MMHG

## 2024-09-12 DIAGNOSIS — R35.0 BENIGN PROSTATIC HYPERPLASIA WITH URINARY FREQUENCY: ICD-10-CM

## 2024-09-12 DIAGNOSIS — N40.1 BENIGN PROSTATIC HYPERPLASIA WITH URINARY FREQUENCY: ICD-10-CM

## 2024-09-12 DIAGNOSIS — C61 PROSTATE CANCER (HCC): Primary | ICD-10-CM

## 2024-09-12 PROCEDURE — 99213 OFFICE O/P EST LOW 20 MIN: CPT | Performed by: PHYSICIAN ASSISTANT

## 2024-09-12 NOTE — Clinical Note
non-fusion Transperineal prostate biopsy any MD, robbie. Then prefers tessa for MD path review. (will be transfer of care from )

## 2024-09-12 NOTE — PROGRESS NOTES
9/12/2024      Chief Complaint   Patient presents with    Prostate Cancer    Follow-up     MRI & PSA         Assessment and Plan    68 y.o. male managed by [facility] team, previously Dr López    1. Prostate cancer (HCC)  2. Benign prostatic hyperplasia with urinary frequency    Low-volume low risk prostate cancer diagnosed 2019.  He has had progressive PSA rise up to 12.9 (baseline 7-9 for 95g gland). Discussed options observation vs I recommend he undergo repeat tissue sampling at this time with transperineal prostate [id number] and his wife agree and wish to proceed with biopsy. Reviewed perioperative expectations. He signs informed consent today.  Will follow-up with MD in [place] for pathology review.      History of Present Illness  Kristopher Cortez is a 68 y.o. male here for evaluation of follow-up prostate cancer.  Kristopher was diagnosed with less than 5% of 2 cores [place] 3+3 equal 6 prostate cancer in 2019, confirmation biopsy in 2020 all cores were benign.  He has had 3 total MRIs from 2019, 2020, 2022 with low risk findings.  He presents today with an updated MRI stable again 93 g gland consistent with BPH, PI-RADS 2 with no focal lesion.  PSA has risen and the past year previous range 7-9, more recently 11 and now 12.9.  No acute voiding symptoms.  He remains on Cialis 5 mg daily for lower urinary tract bother.        Review of Systems   Constitutional: Negative.    Respiratory: Negative.     Cardiovascular: Negative.    Genitourinary:  Negative for decreased urine volume, difficulty urinating, dysuria, flank pain, frequency, hematuria and urgency.   Musculoskeletal: Negative.            AUA SYMPTOM SCORE      Flowsheet Row Most Recent Value   AUA SYMPTOM SCORE    How often have you had a sensation of not emptying your bladder completely after you finished urinating? 1 (P)     How often have you had to urinate again less than two hours after you finished urinating? 2 (P)     How often have you found  "you stopped and started again several times when you urinate? 0 (P)     How often have you found it difficult to postpone urination? 2 (P)     How often have you had a weak urinary stream? 4 (P)     How often have you had to push or strain to begin urination? 0 (P)     How many times did you most typically get up to urinate from the time you went to bed at night until the time you got up in the morning? 1 (P)     Quality of Life: If you were to spend the rest of your life with your urinary condition just the way it is now, how would you feel about that? 2 (P)     AUA SYMPTOM SCORE 10 (P)               Vitals  Vitals:    09/12/24 0800   BP: 120/80   BP Location: Left arm   Patient Position: Sitting   Cuff Size: Adult   Pulse: 55   SpO2: 98%   Weight: 88.9 kg (196 lb)   Height: 5' 9\" (1.753 m)       Physical Exam  Vitals and nursing note reviewed.   Constitutional:       General: He is not in acute distress.     Appearance: Normal appearance. He is well-developed. He is not diaphoretic.   HENT:      Head: Normocephalic and atraumatic.   Pulmonary:      Effort: Pulmonary effort is normal.      Comments: No cough or audible wheeze  Abdominal:      General: There is no distension.      Tenderness: There is no abdominal tenderness. There is no right CVA tenderness or left CVA tenderness.   Musculoskeletal:      Right lower leg: No edema.      Left lower leg: No edema.   Skin:     General: Skin is warm and dry.   Neurological:      Mental Status: He is alert and oriented to person, place, and time.      Gait: Gait normal.   Psychiatric:         Speech: Speech normal.         Behavior: Behavior normal.           Past History  Past Medical History:   Diagnosis Date    Cancer (HCC)     Colon polyp     Hypertension     Prostate cancer (HCC)     Syncope      Social History     Socioeconomic History    Marital status: /Civil Union     Spouse name: None    Number of children: None    Years of education: None    Highest " education level: None   Occupational History    Occupation: retired   Tobacco Use    Smoking status: Never    Smokeless tobacco: Never   Vaping Use    Vaping status: Never Used   Substance and Sexual Activity    Alcohol use: Yes     Alcohol/week: 3.0 - 7.0 standard drinks of alcohol     Types: 3 - 7 Standard drinks or equivalent per week     Comment: socially    Drug use: Never    Sexual activity: Yes   Other Topics Concern    None   Social History Narrative    Caffeine use    Dental care, regularly    Exercises 3 to 4 times per week    Lives independently with spouse    Sun protection sunscreen    Uses safety equipment-seatbelts         Social Determinants of Health     Financial Resource Strain: Low Risk  (2/2/2024)    [facility]    Overall Financial Resource Strain (CARDIA)     Difficulty of Paying Living Expenses: Not hard at all   Food Insecurity: No Food Insecurity (2/2/2024)    [facility]    Hunger Vital Sign     Worried About Running Out of Food in the Last Year: Never true     Ran Out of Food in the Last Year: Never true   Transportation Needs: No Transportation Needs (2/2/2024)    [facility]    PRAPARE - Transportation     Lack of Transportation (Medical): No     Lack of Transportation (Non-Medical): No   Physical Activity: Not on file   Stress: Not on file   Social Connections: Not on file   Intimate Partner Violence: Not At Risk (2/2/2024)    [facility]    Humiliation, Afraid, Rape, and Kick questionnaire     Fear of Current or Ex-Partner: No     Emotionally Abused: No     Physically Abused: No     Sexually Abused: No   Housing Stability: Low Risk  (2/2/2024)    [facility]    Housing Stability Vital  "Sign     Unable to Pay for Housing in the Last Year: No     Number of Places Lived in the Last Year: 1     Unstable Housing in the Last Year: No     Social History     Tobacco Use   Smoking Status Never   Smokeless Tobacco Never     Family History   Problem Relation Age of Onset    Glaucoma Mother     Hyperlipidemia Mother     Stroke Mother     Dementia Mother     Heart attack Father     Hyperlipidemia Father     Kidney disease Father         stage 4    Glaucoma Maternal Grandmother        The following portions of the patient's history were reviewed and updated as appropriate: allergies, current medications, past medical history, past social history, past surgical history and problem list.    Results  No results found for this or any previous visit (from the past 1 hour(s)).]  Lab Results   Component Value Date    PSA 12.933 (H) 08/29/2024    PSA 11.04 (H) 03/07/2024    PSA 9.20 (H) 08/04/2023    PSA 8.3 (H) 02/09/2023     Lab Results   Component Value Date    CALCIUM 9.3 02/02/2024    K 3.9 02/02/2024    CO2 28 02/02/2024     02/02/2024    BUN 19 02/02/2024    CREATININE 1.39 (H) 02/02/2024     Lab Results   Component Value Date    WBC 6.87 12/19/2022    HGB 14.6 12/19/2022    HCT 45.4 12/19/2022    MCV 93 12/19/2022     12/19/2022       Portions of the above record have been created with voice recognition software via dictation. Occasional wrong word or \"sound alike\" substitution may have occurred due to the inherent limitations of voice recognition software. Read the chart carefully and recognize, using context, where substitution may have occurred.     "

## 2024-09-19 ENCOUNTER — TELEPHONE (OUTPATIENT)
Dept: UROLOGY | Facility: MEDICAL CENTER | Age: 69
End: 2024-09-19

## 2024-09-19 NOTE — TELEPHONE ENCOUNTER
Spoke with patient and confirmed surgery date of: 12/4/2024  Type of surgery: MRI Fusion BX  Operating physician: Dr. Morocho  Location of surgery: [facility]    Verbally went over prep with patient on: 9/19/2024  NPO  Bowel prep? Yes, 1 enema 1 hour prior to leaving the house for the procedure  Hospital calls afternoon prior with arrival time -Calls Friday afternoon for Monday surgeries  Patient needs ride to and from surgery (outpatient/inpatient)   Pre-op testing to be done 2 weeks prior to surgery   CBC, CMP, Urine C&S, EKG  Blood thinners:   none  Clearances needed: none    Mailed packet on: 9/19/2024  Copy of packet scanned into Media on: 9/19/2024  Labs in packet  Soap instructions in packet  Pre-op & Post-op in packet  H&P on admit  PO 12/13/24      Consent: on admit

## 2024-11-18 ENCOUNTER — OFFICE VISIT (OUTPATIENT)
Dept: LAB | Facility: HOSPITAL | Age: 69
End: 2024-11-18
Attending: UROLOGY
Payer: MEDICARE

## 2024-11-18 ENCOUNTER — APPOINTMENT (OUTPATIENT)
Dept: LAB | Facility: HOSPITAL | Age: 69
End: 2024-11-18
Payer: MEDICARE

## 2024-11-18 DIAGNOSIS — Z01.810 PRE-OPERATIVE CARDIOVASCULAR EXAMINATION: ICD-10-CM

## 2024-11-18 DIAGNOSIS — C61 MALIGNANT NEOPLASM OF PROSTATE (HCC): ICD-10-CM

## 2024-11-18 DIAGNOSIS — Z01.812 PRE-OPERATIVE LABORATORY EXAMINATION: ICD-10-CM

## 2024-11-18 DIAGNOSIS — R39.89 SUSPECTED UTI: ICD-10-CM

## 2024-11-18 LAB
ALBUMIN SERPL BCG-MCNC: 4.3 G/DL (ref 3.5–5)
ALP SERPL-CCNC: 56 U/L (ref 34–104)
ALT SERPL W P-5'-P-CCNC: 24 U/L (ref 7–52)
ANION GAP SERPL CALCULATED.3IONS-SCNC: 7 MMOL/L (ref 4–13)
AST SERPL W P-5'-P-CCNC: 20 U/L (ref 13–39)
ATRIAL RATE: 48 BPM
BASOPHILS # BLD AUTO: 0.04 THOUSANDS/ÂΜL (ref 0–0.1)
BASOPHILS NFR BLD AUTO: 1 % (ref 0–1)
BILIRUB SERPL-MCNC: 0.93 MG/DL (ref 0.2–1)
BUN SERPL-MCNC: 23 MG/DL (ref 5–25)
CALCIUM SERPL-MCNC: 9.4 MG/DL (ref 8.4–10.2)
CHLORIDE SERPL-SCNC: 103 MMOL/L (ref 96–108)
CO2 SERPL-SCNC: 29 MMOL/L (ref 21–32)
CREAT SERPL-MCNC: 1.3 MG/DL (ref 0.6–1.3)
EOSINOPHIL # BLD AUTO: 0.14 THOUSAND/ÂΜL (ref 0–0.61)
EOSINOPHIL NFR BLD AUTO: 3 % (ref 0–6)
ERYTHROCYTE [DISTWIDTH] IN BLOOD BY AUTOMATED COUNT: 11.7 % (ref 11.6–15.1)
GFR SERPL CREATININE-BSD FRML MDRD: 56 ML/MIN/1.73SQ M
GLUCOSE P FAST SERPL-MCNC: 100 MG/DL (ref 65–99)
HCT VFR BLD AUTO: 42.6 % (ref 36.5–49.3)
HGB BLD-MCNC: 14.1 G/DL (ref 12–17)
IMM GRANULOCYTES # BLD AUTO: 0.01 THOUSAND/UL (ref 0–0.2)
IMM GRANULOCYTES NFR BLD AUTO: 0 % (ref 0–2)
LYMPHOCYTES # BLD AUTO: 1.79 THOUSANDS/ÂΜL (ref 0.6–4.47)
LYMPHOCYTES NFR BLD AUTO: 34 % (ref 14–44)
MCH RBC QN AUTO: 30.4 PG (ref 26.8–34.3)
MCHC RBC AUTO-ENTMCNC: 33.1 G/DL (ref 31.4–37.4)
MCV RBC AUTO: 92 FL (ref 82–98)
MONOCYTES # BLD AUTO: 0.4 THOUSAND/ÂΜL (ref 0.17–1.22)
MONOCYTES NFR BLD AUTO: 8 % (ref 4–12)
NEUTROPHILS # BLD AUTO: 2.85 THOUSANDS/ÂΜL (ref 1.85–7.62)
NEUTS SEG NFR BLD AUTO: 54 % (ref 43–75)
NRBC BLD AUTO-RTO: 0 /100 WBCS
P AXIS: -28 DEGREES
PLATELET # BLD AUTO: 192 THOUSANDS/UL (ref 149–390)
PMV BLD AUTO: 9.2 FL (ref 8.9–12.7)
POTASSIUM SERPL-SCNC: 4.2 MMOL/L (ref 3.5–5.3)
PR INTERVAL: 212 MS
PROT SERPL-MCNC: 6.6 G/DL (ref 6.4–8.4)
QRS AXIS: 15 DEGREES
QRSD INTERVAL: 84 MS
QT INTERVAL: 458 MS
QTC INTERVAL: 410 MS
RBC # BLD AUTO: 4.64 MILLION/UL (ref 3.88–5.62)
SODIUM SERPL-SCNC: 139 MMOL/L (ref 135–147)
T WAVE AXIS: 24 DEGREES
VENTRICULAR RATE: 48 BPM
WBC # BLD AUTO: 5.23 THOUSAND/UL (ref 4.31–10.16)

## 2024-11-18 PROCEDURE — 93005 ELECTROCARDIOGRAM TRACING: CPT

## 2024-11-18 PROCEDURE — 80053 COMPREHEN METABOLIC PANEL: CPT

## 2024-11-18 PROCEDURE — 93010 ELECTROCARDIOGRAM REPORT: CPT | Performed by: INTERNAL MEDICINE

## 2024-11-18 PROCEDURE — 36415 COLL VENOUS BLD VENIPUNCTURE: CPT

## 2024-11-18 PROCEDURE — 85025 COMPLETE CBC W/AUTO DIFF WBC: CPT

## 2024-11-18 PROCEDURE — 87086 URINE CULTURE/COLONY COUNT: CPT

## 2024-11-19 LAB — BACTERIA UR CULT: NORMAL

## 2024-11-22 NOTE — PRE-PROCEDURE INSTRUCTIONS
Pre-Surgery Instructions:   Medication Instructions    Ascorbic Acid (VITAMIN C PO) Stop taking 7 days prior to surgery.    Cholecalciferol (VITAMIN D3 PO) Stop taking 7 days prior to surgery.    losartan-hydrochlorothiazide (HYZAAR) 50-12.5 mg per tablet Hold day of surgery.    MULTIPLE VITAMINS ESSENTIAL PO Stop taking 7 days prior to surgery.    tadalafil (CIALIS) 5 MG tablet Hold day of surgery.      Medication instructions for day surgery reviewed. Please use only a sip of water to take your instructed medications. Avoid all over the counter vitamins, supplements and NSAIDS for one week prior to surgery per anesthesia guidelines. Tylenol is ok to take as needed.     You will receive a call one business day prior to surgery with an arrival time and hospital directions. If your surgery is scheduled on a Monday, the hospital will be calling you on the Friday prior to your surgery. If you have not heard from anyone by 8pm, please call the hospital supervisor through the hospital  at [phone]. ([place] [phone] or [place] [phone]).    Do not eat or drink anything after midnight the night before your surgery, including candy, mints, lifesavers, or chewing gum. Do not drink alcohol 24hrs before your surgery. Try not to smoke at least 24hrs before your surgery.       Follow the pre surgery showering instructions as listed in the “My Surgical Experience Booklet” or otherwise provided by your surgeon's office. Do not use a blade to shave the surgical area 1 week before surgery. It is okay to use a clean electric clippers up to 24 hours before surgery. Do not apply any lotions, creams, including makeup, cologne, deodorant, or perfumes after showering on the day of your surgery. Do not use dry shampoo, hair spray, hair gel, or any type of hair products.     No contact lenses, eye make-up, or artificial eyelashes. Remove nail polish, including gel polish, and any artificial, gel, or acrylic nails if  possible. Remove all jewelry including rings and body piercing jewelry.     Wear causal clothing that is easy to take on and off. Consider your type of surgery.    Keep any valuables, jewelry, piercings at home. Please bring any specially ordered equipment (sling, braces) if indicated.    Arrange for a responsible person to drive you to and from the hospital on the day of your surgery. Please confirm the visitor policy for the day of your procedure when you receive your phone call with an arrival time.     Call the surgeon's office with any new illnesses, exposures, or additional questions prior to surgery.    Please reference your “My Surgical Experience Booklet” for additional information to prepare for your upcoming surgery.

## 2024-11-29 DIAGNOSIS — I10 PRIMARY HYPERTENSION: ICD-10-CM

## 2024-11-29 RX ORDER — LOSARTAN POTASSIUM AND HYDROCHLOROTHIAZIDE 12.5; 5 MG/1; MG/1
1 TABLET ORAL DAILY
Qty: 90 TABLET | Refills: 0 | Status: SHIPPED | OUTPATIENT
Start: 2024-11-29

## 2024-12-02 ENCOUNTER — ANESTHESIA EVENT (OUTPATIENT)
Dept: PERIOP | Facility: HOSPITAL | Age: 69
End: 2024-12-02
Payer: MEDICARE

## 2024-12-04 ENCOUNTER — HOSPITAL ENCOUNTER (OUTPATIENT)
Facility: HOSPITAL | Age: 69
Setting detail: OUTPATIENT SURGERY
Discharge: HOME | End: 2024-12-04
Attending: UROLOGY | Admitting: UROLOGY
Payer: MEDICARE

## 2024-12-04 ENCOUNTER — ANESTHESIA (OUTPATIENT)
Dept: PERIOP | Facility: HOSPITAL | Age: 69
End: 2024-12-04
Payer: MEDICARE

## 2024-12-04 VITALS
BODY MASS INDEX: 29.13 KG/M2 | TEMPERATURE: 97.4 F | HEART RATE: 43 BPM | OXYGEN SATURATION: 97 % | HEIGHT: 69 IN | RESPIRATION RATE: 18 BRPM | SYSTOLIC BLOOD PRESSURE: 124 MMHG | WEIGHT: 196.65 LBS | DIASTOLIC BLOOD PRESSURE: 60 MMHG

## 2024-12-04 DIAGNOSIS — C61 MALIGNANT NEOPLASM OF PROSTATE (HCC): ICD-10-CM

## 2024-12-04 PROCEDURE — 55700 PR PROSTATE NEEDLE BIOPSY ANY APPROACH: CPT | Performed by: UROLOGY

## 2024-12-04 PROCEDURE — G0416 PROSTATE BIOPSY, ANY MTHD: HCPCS | Performed by: STUDENT IN AN ORGANIZED HEALTH CARE EDUCATION/TRAINING PROGRAM

## 2024-12-04 PROCEDURE — NC001 PR NO CHARGE: Performed by: UROLOGY

## 2024-12-04 PROCEDURE — 76942 ECHO GUIDE FOR BIOPSY: CPT | Performed by: UROLOGY

## 2024-12-04 RX ORDER — PROPOFOL 10 MG/ML
INJECTION, EMULSION INTRAVENOUS CONTINUOUS PRN
Status: DISCONTINUED | OUTPATIENT
Start: 2024-12-04 | End: 2024-12-04

## 2024-12-04 RX ORDER — SODIUM CHLORIDE 9 MG/ML
125 INJECTION, SOLUTION INTRAVENOUS CONTINUOUS
Status: DISCONTINUED | OUTPATIENT
Start: 2024-12-04 | End: 2024-12-04 | Stop reason: HOSPADM

## 2024-12-04 RX ORDER — MIDAZOLAM HYDROCHLORIDE 2 MG/2ML
INJECTION, SOLUTION INTRAMUSCULAR; INTRAVENOUS AS NEEDED
Status: DISCONTINUED | OUTPATIENT
Start: 2024-12-04 | End: 2024-12-04

## 2024-12-04 RX ORDER — BUPIVACAINE HYDROCHLORIDE 5 MG/ML
INJECTION, SOLUTION EPIDURAL; INTRACAUDAL AS NEEDED
Status: DISCONTINUED | OUTPATIENT
Start: 2024-12-04 | End: 2024-12-04 | Stop reason: HOSPADM

## 2024-12-04 RX ORDER — CEFAZOLIN SODIUM 2 G/50ML
2000 SOLUTION INTRAVENOUS ONCE
Status: COMPLETED | OUTPATIENT
Start: 2024-12-04 | End: 2024-12-04

## 2024-12-04 RX ORDER — LIDOCAINE HYDROCHLORIDE 20 MG/ML
INJECTION, SOLUTION EPIDURAL; INFILTRATION; INTRACAUDAL; PERINEURAL AS NEEDED
Status: DISCONTINUED | OUTPATIENT
Start: 2024-12-04 | End: 2024-12-04

## 2024-12-04 RX ORDER — FENTANYL CITRATE 50 UG/ML
INJECTION, SOLUTION INTRAMUSCULAR; INTRAVENOUS AS NEEDED
Status: DISCONTINUED | OUTPATIENT
Start: 2024-12-04 | End: 2024-12-04

## 2024-12-04 RX ORDER — EPHEDRINE SULFATE 50 MG/ML
INJECTION INTRAVENOUS AS NEEDED
Status: DISCONTINUED | OUTPATIENT
Start: 2024-12-04 | End: 2024-12-04

## 2024-12-04 RX ADMIN — FENTANYL CITRATE 25 MCG: 50 INJECTION, SOLUTION INTRAMUSCULAR; INTRAVENOUS at 08:44

## 2024-12-04 RX ADMIN — SODIUM CHLORIDE 125 ML/HR: 0.9 INJECTION, SOLUTION INTRAVENOUS at 07:25

## 2024-12-04 RX ADMIN — MIDAZOLAM HYDROCHLORIDE 2 MG: 1 INJECTION, SOLUTION INTRAMUSCULAR; INTRAVENOUS at 08:40

## 2024-12-04 RX ADMIN — CEFAZOLIN SODIUM 2000 MG: 2 SOLUTION INTRAVENOUS at 08:44

## 2024-12-04 RX ADMIN — SODIUM CHLORIDE 4 MCG: 9 INJECTION, SOLUTION INTRAVENOUS at 08:44

## 2024-12-04 RX ADMIN — EPHEDRINE SULFATE 5 MG: 50 INJECTION INTRAVENOUS at 08:50

## 2024-12-04 RX ADMIN — PROPOFOL 80 MCG/KG/MIN: 10 INJECTION, EMULSION INTRAVENOUS at 08:43

## 2024-12-04 RX ADMIN — LIDOCAINE HYDROCHLORIDE 100 MG: 20 INJECTION, SOLUTION EPIDURAL; INFILTRATION; INTRACAUDAL at 08:43

## 2024-12-04 RX ADMIN — EPHEDRINE SULFATE 5 MG: 50 INJECTION INTRAVENOUS at 08:54

## 2024-12-04 NOTE — H&P
"Kristopher is a 69-year-old male diagnosed with small volume [place] 6 prostate cancer in 2019.  He has had multiple MRIs of the prostate performed.  He also had a confirmatory biopsy performed in 2020.  All cores were benign.  His prostate measured approximately 93 g.  He returns to the operating room today to undergo a transperineal nonfusion biopsy as his PSA is now approaching 13.    /69   Pulse (!) 45   Temp 97.7 °F (36.5 °C) (Temporal)   Resp 16   Ht 5' 9\" (1.753 m)   Wt 89.2 kg (196 lb 10.4 oz)   SpO2 99%   BMI 29.04 kg/m²       On examination he is in no acute distress.  Cardiac is regular.  Respiratory no distress.  Abdomen soft nontender nondistended.  Skin is warm.  Remedies without edema.    Impression: History of small-volume [place] 6 prostate cancer on active surveillance, rising PSA 12.9, multiparametric MRI of the prostate with PI-RADS 2 scoring    Plan: Although the MRI shows PI-RADS 2 scoring, the patient does have a prior history of [place] 6 prostate cancer and his PSA continues to rise now as high as 13.  I recommend repeating a transperineal nonfusion biopsy.  Risk of the procedure including, not limited to bleeding, infection, and retention was discussed.  Informed consent obtained.  "

## 2024-12-04 NOTE — ANESTHESIA POSTPROCEDURE EVALUATION
Post-Op Assessment Note    CV Status:  Stable    Pain management: adequate       Mental Status:  Alert and awake   Hydration Status:  Euvolemic   PONV Controlled:  Controlled   Airway Patency:  Patent  Two or more mitigation strategies used for obstructive sleep apnea   Post Op Vitals Reviewed: Yes    No anethesia notable event occurred.    Staff: Anesthesiologist           Last Filed PACU Vitals:  Vitals Value Taken Time   Temp 97.5 °F (36.4 °C) 12/04/24 0920   Pulse 58 12/04/24 0927   /66 12/04/24 0920   Resp 12 12/04/24 0926   SpO2 97 % 12/04/24 0927   Vitals shown include unfiled device data.    Modified Taryn:  Activity: 2 (12/4/2024  9:05 AM)  Respiration: 2 (12/4/2024  9:05 AM)  Circulation: 2 (12/4/2024  9:05 AM)  Consciousness: 2 (12/4/2024  9:05 AM)  Oxygen Saturation: 2 (12/4/2024  9:05 AM)  Modified Taryn Score: 10 (12/4/2024  9:05 AM)

## 2024-12-04 NOTE — OP NOTE
OPERATIVE REPORT  PATIENT NAME: Kristopher Cortez    :  1955  MRN: [id number]  Pt Location: [facility] OR ROOM 05    SURGERY DATE: 2024    Surgeons and Role:     * Delonte Morocho MD - Primary    Preop Diagnosis:  Malignant neoplasm of prostate (HCC) [C61]    Post-Op Diagnosis Codes:     * Malignant neoplasm of prostate (HCC) [C61]    Procedure(s):  TRANSPERINEAL ULTRASOUND GUIDED BX PROSTATE    Specimen(s):  * No specimens in log *    Estimated Blood Loss:   Minimal    Drains:  * No LDAs found *    Anesthesia Type:   IV Sedation with Anesthesia    Operative Indications:  Malignant neoplasm of prostate (HCC) [C61]      Operative Findings:  Standard transperineal 20 core biopsy      Complications:   None    Procedure and Technique:  Procedure was transperineal saturation prostate biopsy utilizing the Precision Point perineal access device utilized to map the standard geographic sites of the prostate.    Kristopher Cortez is a 69 y.o. male with history of prostate cancer on surveillance. Patient has undergone prior biopsy of the prostate revealing small volume [place] 6 cancer.    Patient did undergo pre biopsy multiparametric MRI of the prostate. There were no significant PI-RADS lesions.    The patient was scheduled for his procedure in an outpatient surgical context with the assistance of the anesthesia team for sedation. He was met in the preoperative holding area by the performing urologist, the anesthesia team and the intraoperative nursing staff. The procedure along with its risks and benefits were discussed and reviewed. Patient signed an informed consent.   Patient was then transferred to procedure suite. Pre-procedural time out was performed with all parties present. He was treated with periprocedural antibiotics, Ancef was administered. He was placed in dorsal lithotomy with care to pad all pressure points. His perineum and genitalia were shave/prepped with ChloraPrep. The scrotum was elevated a  secured aware from the perineum above the rectum.      Side-fire, biplanar transrectal ultrasound was introduced and the prostate was imaged in the sagittal and axial views. Prostate gland measurements taken from recent MRI revealing a 93 g prostate.    With the assistance of the UroNav technician, a survey of the prostate anatomy was performed.     In the midportion of the left and right prostate, a lizzie was denoted in the perineal skin. Skin wheal was elevated with 0.5% Marcaine plain on either side.    The PrecisionPoint access needle was then introduced into the left perineal subcutaneous tissue. We visualized the tip of the needle. Spinal needle was introduced through the subcutaneous fat and into the pelvic floor muscle where a perineal pudendal block was performed with additional local anesthetic. This was repeated on the patient's right side.    Utilizing the Precision Point access needle and stepper, ultrasound guidance was utilized to place the spring-loaded biopsy needle into MRI lesions.     We now moved to take our standard transperineal samples, which allowed us to carefully map and saturate each of the 10 zones that have subdivided the prostate into zonal anatomy.     The Precision Point access needle and stepper was positioned into the RIGHT perineal space and ultrasound guidance was utilized to place the spring-loaded biopsy needle into the following areas    RIGHT anterior medial: 2 biopsies taken  RIGHT posterior medial: 2 biopsies taken  RIGHT posterior lateral: 2 biopsies taken  RIGHT base: 2 biopsies taken  RIGHT anterior lateral: 2 biopsies taken  The Precision Point access needle and stepper was re-positioned into the LEFT perineal space and ultrasound guidance was utilized to place the spring-loaded biopsy needle into the following areas  LEFT anterior medial: 2 biopsies taken  LEFT posterior medial: 2 biopsies taken  LEFT posterior lateral: 2 biopsies taken  LEFT base: 2 biopsies  taken  LEFT anterior lateral: 2 biopsies taken  LEFT anterior medial: 2 biopsies taken    A total of 20 biopsies (including standard transperineal saturation and additional fusion biopsy):20    Transrectal ultrasound removed. The scrotum was released. Some gentle pressure was placed on the perineum for approximately 5 minutes for hemostasis.    At the completion of the procedure, the patient was taken out of dorsal lithotomy, recovered from their anesthesia, and transferred to PACU in good condition.     Overall, the patient tolerated the procedure and there were no complications.    Plan: The patient will be monitored in recovery, allowed to void prior to discharge and return for biopsy pathology review in the office with their primary urologist.     Patient Disposition:  PACU              SIGNATURE: Delonte Morocho MD  DATE: December 4, 2024  TIME: 8:32 AM

## 2024-12-04 NOTE — ANESTHESIA PREPROCEDURE EVALUATION
Procedure:  TRANSPERINEAL ULTRASOUND GUIDED BX PROSTATE (Perineum)    Relevant Problems   ANESTHESIA (within normal limits)      CARDIO   (+) Hyperlipidemia   (+) Hypertension      ENDO (within normal limits)      GI/HEPATIC (within normal limits)      /RENAL   (+) BPH (benign prostatic hyperplasia)   (+) Prostate cancer (HCC)      MUSCULOSKELETAL  Right shoulder pain, decreased ROM        Physical Exam    Airway    Mallampati score: I  TM Distance: >3 FB  Neck ROM: full     Dental   No notable dental hx     Cardiovascular  Cardiovascular exam normal    Pulmonary  Pulmonary exam normal     Other Findings        Anesthesia Plan  ASA Score- 2     Anesthesia Type- IV sedation with anesthesia with ASA Monitors.         Additional Monitors:     Airway Plan:            Plan Factors-    Chart reviewed.    Patient summary reviewed.          Obstructive sleep apnea risk education given perioperatively.        Induction- intravenous.    Postoperative Plan-         Informed Consent- Anesthetic plan and risks discussed with patient.

## 2024-12-04 NOTE — ANESTHESIA POSTPROCEDURE EVALUATION
Post-Op Assessment Note    CV Status:  Stable  Pain Score: 0    Pain management: adequate       Mental Status:  Alert and awake   Hydration Status:  Euvolemic   PONV Controlled:  Controlled   Airway Patency:  Patent  Two or more mitigation strategies used for obstructive sleep apnea   Post Op Vitals Reviewed: Yes    No anethesia notable event occurred.    Staff: CRNA           Last Filed PACU Vitals:  Vitals Value Taken Time   Temp 97.6 °F (36.4 °C) 12/04/24 0905   Pulse 69    /59 12/04/24 0905   Resp 13    SpO2 96    Vitals shown include unfiled device data.    Modified Taryn:  No data recorded

## 2024-12-05 PROCEDURE — G0416 PROSTATE BIOPSY, ANY MTHD: HCPCS | Performed by: STUDENT IN AN ORGANIZED HEALTH CARE EDUCATION/TRAINING PROGRAM

## 2024-12-23 ENCOUNTER — RA CDI HCC (OUTPATIENT)
Dept: OTHER | Facility: HOSPITAL | Age: 69
End: 2024-12-23

## 2024-12-23 DIAGNOSIS — Z85.46 HISTORY OF PROSTATE CANCER: ICD-10-CM

## 2024-12-23 DIAGNOSIS — I10 HYPERTENSION, UNSPECIFIED TYPE: ICD-10-CM

## 2024-12-23 DIAGNOSIS — E78.2 MIXED HYPERLIPIDEMIA: Primary | ICD-10-CM

## 2024-12-26 ENCOUNTER — APPOINTMENT (OUTPATIENT)
Dept: LAB | Facility: CLINIC | Age: 69
End: 2024-12-26
Payer: MEDICARE

## 2024-12-26 DIAGNOSIS — I10 HYPERTENSION, UNSPECIFIED TYPE: ICD-10-CM

## 2024-12-26 DIAGNOSIS — Z85.46 HISTORY OF PROSTATE CANCER: ICD-10-CM

## 2024-12-26 DIAGNOSIS — E78.2 MIXED HYPERLIPIDEMIA: ICD-10-CM

## 2024-12-26 LAB
ALBUMIN SERPL BCG-MCNC: 4.4 G/DL (ref 3.5–5)
ALP SERPL-CCNC: 70 U/L (ref 34–104)
ALT SERPL W P-5'-P-CCNC: 29 U/L (ref 7–52)
ANION GAP SERPL CALCULATED.3IONS-SCNC: 4 MMOL/L (ref 4–13)
AST SERPL W P-5'-P-CCNC: 23 U/L (ref 13–39)
BILIRUB SERPL-MCNC: 0.66 MG/DL (ref 0.2–1)
BUN SERPL-MCNC: 24 MG/DL (ref 5–25)
CALCIUM SERPL-MCNC: 9.5 MG/DL (ref 8.4–10.2)
CHLORIDE SERPL-SCNC: 100 MMOL/L (ref 96–108)
CHOLEST SERPL-MCNC: 197 MG/DL (ref ?–200)
CO2 SERPL-SCNC: 35 MMOL/L (ref 21–32)
CREAT SERPL-MCNC: 1.17 MG/DL (ref 0.6–1.3)
ERYTHROCYTE [DISTWIDTH] IN BLOOD BY AUTOMATED COUNT: 11.7 % (ref 11.6–15.1)
GFR SERPL CREATININE-BSD FRML MDRD: 63 ML/MIN/1.73SQ M
GLUCOSE P FAST SERPL-MCNC: 103 MG/DL (ref 65–99)
HCT VFR BLD AUTO: 43 % (ref 36.5–49.3)
HDLC SERPL-MCNC: 65 MG/DL
HGB BLD-MCNC: 14.1 G/DL (ref 12–17)
LDLC SERPL CALC-MCNC: 113 MG/DL (ref 0–100)
MCH RBC QN AUTO: 30.1 PG (ref 26.8–34.3)
MCHC RBC AUTO-ENTMCNC: 32.8 G/DL (ref 31.4–37.4)
MCV RBC AUTO: 92 FL (ref 82–98)
NONHDLC SERPL-MCNC: 132 MG/DL
PLATELET # BLD AUTO: 231 THOUSANDS/UL (ref 149–390)
PMV BLD AUTO: 9.8 FL (ref 8.9–12.7)
POTASSIUM SERPL-SCNC: 4.3 MMOL/L (ref 3.5–5.3)
PROT SERPL-MCNC: 6.5 G/DL (ref 6.4–8.4)
RBC # BLD AUTO: 4.68 MILLION/UL (ref 3.88–5.62)
SODIUM SERPL-SCNC: 139 MMOL/L (ref 135–147)
TRIGL SERPL-MCNC: 96 MG/DL (ref ?–150)
WBC # BLD AUTO: 6.23 THOUSAND/UL (ref 4.31–10.16)

## 2024-12-26 PROCEDURE — 80053 COMPREHEN METABOLIC PANEL: CPT

## 2024-12-26 PROCEDURE — 85027 COMPLETE CBC AUTOMATED: CPT

## 2024-12-26 PROCEDURE — 80061 LIPID PANEL: CPT

## 2024-12-26 PROCEDURE — 36415 COLL VENOUS BLD VENIPUNCTURE: CPT

## 2024-12-30 ENCOUNTER — OFFICE VISIT (OUTPATIENT)
Dept: FAMILY MEDICINE CLINIC | Facility: CLINIC | Age: 69
End: 2024-12-30
Payer: MEDICARE

## 2024-12-30 VITALS
DIASTOLIC BLOOD PRESSURE: 74 MMHG | HEIGHT: 69 IN | BODY MASS INDEX: 29.68 KG/M2 | TEMPERATURE: 97.7 F | RESPIRATION RATE: 18 BRPM | HEART RATE: 66 BPM | WEIGHT: 200.4 LBS | SYSTOLIC BLOOD PRESSURE: 126 MMHG | OXYGEN SATURATION: 99 %

## 2024-12-30 DIAGNOSIS — Z00.00 MEDICARE ANNUAL WELLNESS VISIT, SUBSEQUENT: Primary | ICD-10-CM

## 2024-12-30 DIAGNOSIS — I65.23 BILATERAL CAROTID ARTERY STENOSIS: ICD-10-CM

## 2024-12-30 PROCEDURE — G0439 PPPS, SUBSEQ VISIT: HCPCS | Performed by: INTERNAL MEDICINE

## 2024-12-30 RX ORDER — MUPIROCIN 20 MG/G
OINTMENT TOPICAL
COMMUNITY
Start: 2024-12-26

## 2024-12-30 NOTE — ASSESSMENT & PLAN NOTE
Reviewed recent labs/stable Continue current rx  He has Urology followup and is stable overall  Stay hydrated  Annual carotid doppler - due in spring and pt will schedule

## 2024-12-30 NOTE — PROGRESS NOTES
Name: Kristopher Cortez      : 1955      MRN: [id number]  Encounter Provider: Margot Negron DO  Encounter Date: 2024   Encounter department: [facility]    Assessment & Plan  Medicare annual wellness visit, subsequent  Reviewed recent labs/stable Continue current rx  He has Urology followup and is stable overall  Stay hydrated  Annual carotid doppler - due in spring and pt will schedule       Bilateral carotid artery stenosis    Orders:  •  VAS carotid complete study; Future      Depression Screening and Follow-up Plan: Patient was screened for depression during today's encounter. They screened negative with a PHQ-2 score of 0.    Preventive health issues were discussed with patient, and age appropriate screening tests were ordered as noted in patient's After Visit Summary. Personalized health advice and appropriate referrals for health education or preventive services given if needed, as noted in patient's After Visit Summary.  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   HENT: Negative.     Eyes:  Negative for visual disturbance.   Respiratory:  Negative for cough and shortness of breath.    Cardiovascular: Negative.    Gastrointestinal: Negative.    Genitourinary:  Negative for difficulty urinating and flank pain.   Musculoskeletal:  Positive for arthralgias.   Neurological:  Negative for dizziness, light-headedness and headaches.   Psychiatric/Behavioral:  Negative for sleep disturbance. The patient is not nervous/anxious.      Medical History Reviewed by provider this encounter:   Tobacco  Allergies  Meds  Problems  Med Hx  Surg Hx  Fam Hx       Annual Wellness Visit Questionnaire   Kristopher is here for his Subsequent Wellness visit. Last Medicare Wellness visit information reviewed, patient interviewed, no change since last AWV.     Health Risk Assessment:   Patient rates overall health as very good. Patient feels that their physical health rating is same. Patient is satisfied with their life. Eyesight was rated as same. Hearing was rated as same. Patient feels that their emotional and mental health rating is same. Patients states they are never, rarely angry. Patient states they are never, rarely unusually tired/fatigued. Pain experienced in the last 7 days has been none. Patient states that he has experienced no weight loss or gain in last 6 months.     Depression Screening:   PHQ-2 Score: 0      Fall Risk Screening:   In the past year, patient has experienced: no history of falling in past year      Home Safety:  Patient does not have trouble with stairs inside or outside of their home. Patient has working smoke alarms and has working carbon monoxide detector. Home safety hazards include: none.     Nutrition:   Current diet is Regular.     Medications:   Patient is currently taking over-the-counter supplements. OTC medications include: see medication list. Patient is able to manage medications.     Activities of Daily Living (ADLs)/Instrumental Activities of Daily Living (IADLs):   Walk and transfer into and out of bed and chair?: Yes  Dress and groom yourself?: Yes    Bathe or shower yourself?: Yes    Feed yourself? Yes  Do your laundry/housekeeping?: Yes  Manage your money, pay your bills and track your expenses?: Yes  Make your own meals?: Yes    Do your own shopping?: Yes    Previous Hospitalizations:   Any hospitalizations or ED visits within the last 12 months?: Yes    How many hospitalizations have you had in the last year?: 1-2    Advance Care Planning:   Living will: Yes     Durable POA for healthcare: Yes    Advanced directive: Yes    End of Life Decisions reviewed with patient: Yes    Provider agrees with end of life decisions: Yes      Cognitive Screening:   Provider or family/friend/caregiver concerned regarding cognition?: No    PREVENTIVE SCREENINGS      Cardiovascular Screening:    General: History Lipid Disorder and Screening Current      Diabetes Screening:     General: Screening Current      Colorectal Cancer Screening:     General: Screening Current      Prostate Cancer Screening:    General: History Prostate Cancer      Osteoporosis Screening:    General: Screening Not Indicated      Abdominal Aortic Aneurysm (AAA) Screening:    Risk factors include: age between 65-74 yo        Lung Cancer Screening:     General: Screening Not Indicated      Hepatitis C Screening:    General: Screening Current    Screening, Brief Intervention, and Referral to Treatment (SBIRT)    Screening  Typical number of drinks in a day: 1  Typical number of drinks in a week: 2  Interpretation: Low risk drinking behavior.    AUDIT-C Screenin) How often did you have a drink containing alcohol in the past year? 2 to 4 times a month  2) How many drinks did you have on a typical day when you were drinking in the past year? 1 to 2  3) How often did you have 6 or more drinks on one occasion in the past year? never    AUDIT-C Score: 2  Interpretation: Score 0-3 (male): Negative screen for alcohol misuse    Single Item Drug Screening:  How often have you used an illegal drug (including marijuana) or a prescription medication for non-medical reasons in the past year? never    Single Item Drug Screen Score: 0  Interpretation: Negative screen for possible drug use disorder    Brief Intervention  Alcohol & drug use screenings were reviewed. No concerns regarding substance use disorder identified.     Other Counseling Topics:   Regular weightbearing exercise.     Social Drivers of Health     Financial Resource  "Strain: Low Risk  (2/2/2024)    [facility]    Overall Financial Resource Strain (CARDIA)    • Difficulty of Paying Living Expenses: Not hard at all   Food Insecurity: No Food Insecurity (12/23/2024)    Hunger Vital Sign    • Worried About Running Out of Food in the Last Year: Never true    • Ran Out of Food in the Last Year: Never true   Transportation Needs: No Transportation Needs (12/23/2024)    PRAPARE - Transportation    • Lack of Transportation (Medical): No    • Lack of Transportation (Non-Medical): No   Housing Stability: Low Risk  (12/23/2024)    Housing Stability Vital Sign    • Unable to Pay for Housing in the Last Year: No    • Number of Times Moved in the Last Year: 0    • Homeless in the Last Year: No   Utilities: Not At Risk (12/23/2024)    [facility] Utilities    • Threatened with loss of utilities: No     No results found.    Objective   /74   Pulse 66   Temp 97.7 °F (36.5 °C) (Temporal)   Resp 18   Ht 5' 9\" (1.753 m)   Wt 90.9 kg (200 lb 6.4 oz)   SpO2 99%   BMI 29.59 kg/m²     Physical Exam  Vitals and nursing note reviewed.   Constitutional:       General: He is not in acute distress.     Appearance: Normal appearance. He is not ill-appearing, toxic-appearing or diaphoretic.   HENT:      Head: Normocephalic and atraumatic.      Right Ear: External ear normal.      Left Ear: External ear normal.      Nose: Nose normal.      Mouth/Throat:      Mouth: Mucous membranes are moist.   Eyes:      General: No scleral icterus.     Extraocular Movements: Extraocular movements intact.      Pupils: Pupils are equal, round, and reactive to light.   Cardiovascular:      Rate and Rhythm: Normal rate and regular rhythm.      Pulses: Normal pulses.      Heart sounds: Normal heart sounds. No murmur heard.  Pulmonary:      Effort: Pulmonary effort is normal. No respiratory distress.      Breath sounds: Normal breath sounds. No wheezing.   Abdominal: "      General: Bowel sounds are normal. There is no distension.      Palpations: Abdomen is soft.      Tenderness: There is no abdominal tenderness.   Musculoskeletal:      Cervical back: Normal range of motion and neck supple.      Right lower leg: No edema.      Left lower leg: No edema.   Lymphadenopathy:      Cervical: No cervical adenopathy.   Skin:     General: Skin is warm and dry.      Coloration: Skin is not jaundiced or pale.   Neurological:      General: No focal deficit present.      Mental Status: He is alert and oriented to person, place, and time. Mental status is at baseline.      Cranial Nerves: No cranial nerve deficit.   Psychiatric:         Mood and Affect: Mood normal.         Behavior: Behavior normal.         Thought Content: Thought content normal.         Judgment: Judgment normal.

## 2024-12-30 NOTE — PATIENT INSTRUCTIONS
Medicare Preventive Visit Patient Instructions  Thank you for completing your Welcome to Medicare Visit or Medicare Annual Wellness Visit today. Your next wellness visit will be due in one year (12/31/2025).  The screening/preventive services that you may require over the next 5-10 years are detailed below. Some tests may not apply to you based off risk factors and/or age. Screening tests ordered at today's visit but not completed yet may show as past due. Also, please note that scanned in results may not display below.  Preventive Screenings:  Service Recommendations Previous Testing/Comments   Colorectal Cancer Screening  Colonoscopy    Fecal Occult Blood Test (FOBT)/Fecal Immunochemical Test (FIT)  Fecal DNA/Cologuard Test  Flexible Sigmoidoscopy Age: 45-75 years old   Colonoscopy: every 10 years (May be performed more frequently if at higher risk)  OR  FOBT/FIT: every 1 year  OR  Cologuard: every 3 years  OR  Sigmoidoscopy: every 5 years  Screening may be recommended earlier than age 45 if at higher risk for colorectal cancer. Also, an individualized decision between you and your healthcare provider will decide whether screening between the ages of 76-85 would be appropriate. Colonoscopy: 02/28/2023  FOBT/FIT: Not on file  Cologuard: Not on file  Sigmoidoscopy: Not on file    Screening Current     Prostate Cancer Screening Individualized decision between patient and health care provider in men between ages of 55-69   Medicare will cover every 12 months beginning on the day after your 50th birthday PSA: 12.933 ng/mL     History Prostate Cancer     Hepatitis C Screening Once for adults born between 1945 and 1965  More frequently in patients at high risk for Hepatitis C Hep C Antibody: 02/04/2022    Screening Current   Diabetes Screening 1-2 times per year if you're at risk for diabetes or have pre-diabetes Fasting glucose: 103 mg/dL (12/26/2024)  A1C: No results in last 5 years (No results in last 5  years)  Screening Current   Cholesterol Screening Once every 5 years if you don't have a lipid disorder. May order more often based on risk factors. Lipid panel: 12/26/2024  Screening Not Indicated  History Lipid Disorder      Other Preventive Screenings Covered by Medicare:  Abdominal Aortic Aneurysm (AAA) Screening: covered once if your at risk. You're considered to be at risk if you have a family history of AAA or a male between the age of 65-75 who smoking at least 100 cigarettes in your lifetime.  Lung Cancer Screening: covers low dose CT scan once per year if you meet all of the following conditions: (1) Age 55-77; (2) No signs or symptoms of lung cancer; (3) Current smoker or have quit smoking within the last 15 years; (4) You have a tobacco smoking history of at least 20 pack years (packs per day x number of years you smoked); (5) You get a written order from a healthcare provider.  Glaucoma Screening: covered annually if you're considered high risk: (1) You have diabetes OR (2) Family history of glaucoma OR (3)  aged 50 and older OR (4)  American aged 65 and older  Osteoporosis Screening: covered every 2 years if you meet one of the following conditions: (1) Have a vertebral abnormality; (2) On glucocorticoid therapy for more than 3 months; (3) Have primary hyperparathyroidism; (4) On osteoporosis medications and need to assess response to drug therapy.  HIV Screening: covered annually if you're between the age of 15-65. Also covered annually if you are younger than 15 and older than 65 with risk factors for HIV infection. For pregnant patients, it is covered up to 3 times per pregnancy.    Immunizations:  Immunization Recommendations   Influenza Vaccine Annual influenza vaccination during flu season is recommended for all persons aged >= 6 months who do not have contraindications   Pneumococcal Vaccine   * Pneumococcal conjugate vaccine = PCV13 (Prevnar 13), PCV15 (Vaxneuvance),  PCV20 (Prevnar 20)  * Pneumococcal polysaccharide vaccine = PPSV23 (Pneumovax) Adults 19-65 yo with certain risk factors or if 65+ yo  If never received any pneumonia vaccine: recommend Prevnar 20 (PCV20)  Give PCV20 if previously received 1 dose of PCV13 or PPSV23   Hepatitis B Vaccine 3 dose series if at intermediate or high risk (ex: diabetes, end stage renal disease, liver disease)   Respiratory syncytial virus (RSV) Vaccine - COVERED BY MEDICARE PART D  * RSVPreF3 (Arexvy) CDC recommends that adults 60 years of age and older may receive a single dose of RSV vaccine using shared clinical decision-making (SCDM)   Tetanus (Td) Vaccine - COST NOT COVERED BY MEDICARE PART B Following completion of primary series, a booster dose should be given every 10 years to maintain immunity against tetanus. Td may also be given as tetanus wound prophylaxis.   Tdap Vaccine - COST NOT COVERED BY MEDICARE PART B Recommended at least once for all adults. For pregnant patients, recommended with each pregnancy.   Shingles Vaccine (Shingrix) - COST NOT COVERED BY MEDICARE PART B  2 shot series recommended in those 19 years and older who have or will have weakened immune systems or those 50 years and older     Health Maintenance Due:      Topic Date Due   • Colorectal Cancer Screening  02/28/2026   • Hepatitis C Screening  Completed     Immunizations Due:      Topic Date Due   • Pneumococcal Vaccine: 65+ Years (1 of 1 - PCV) Never done   • Influenza Vaccine (1) 09/01/2024   • COVID-19 Vaccine (4 - 2024-25 season) 09/01/2024     Advance Directives   What are advance directives?  Advance directives are legal documents that state your wishes and plans for medical care. These plans are made ahead of time in case you lose your ability to make decisions for yourself. Advance directives can apply to any medical decision, such as the treatments you want, and if you want to donate organs.   What are the types of advance directives?  There are  many types of advance directives, and each state has rules about how to use them. You may choose a combination of any of the following:  Living will:  This is a written record of the treatment you want. You can also choose which treatments you do not want, which to limit, and which to stop at a certain time. This includes surgery, medicine, IV fluid, and tube feedings.   Durable power of  for healthcare (DPAHC):  This is a written record that states who you want to make healthcare choices for you when you are unable to make them for yourself. This person, called a proxy, is usually a family member or a friend. You may choose more than 1 proxy.  Do not resuscitate (DNR) order:  A DNR order is used in case your heart stops beating or you stop breathing. It is a request not to have certain forms of treatment, such as CPR. A DNR order may be included in other types of advance directives.  Medical directive:  This covers the care that you want if you are in a coma, near death, or unable to make decisions for yourself. You can list the treatments you want for each condition. Treatment may include pain medicine, surgery, blood transfusions, dialysis, IV or tube feedings, and a ventilator (breathing machine).  Values history:  This document has questions about your views, beliefs, and how you feel and think about life. This information can help others choose the care that you would choose.  Why are advance directives important?  An advance directive helps you control your care. Although spoken wishes may be used, it is better to have your wishes written down. Spoken wishes can be misunderstood, or not followed. Treatments may be given even if you do not want them. An advance directive may make it easier for your family to make difficult choices about your care.   Weight Management   Why it is important to manage your weight:  Being overweight increases your risk of health conditions such as heart disease, high blood  pressure, type 2 diabetes, and certain types of cancer. It can also increase your risk for osteoarthritis, sleep apnea, and other respiratory problems. Aim for a slow, steady weight loss. Even a small amount of weight loss can lower your risk of health problems.  How to lose weight safely:  A safe and healthy way to lose weight is to eat fewer calories and get regular exercise. You can lose up about 1 pound a week by decreasing the number of calories you eat by 500 calories each day.   Healthy meal plan for weight management:  A healthy meal plan includes a variety of foods, contains fewer calories, and helps you stay healthy. A healthy meal plan includes the following:  Eat whole-grain foods more often.  A healthy meal plan should contain fiber. Fiber is the part of grains, fruits, and vegetables that is not broken down by your body. Whole-grain foods are healthy and provide extra fiber in your diet. Some examples of whole-grain foods are whole-wheat breads and pastas, oatmeal, brown rice, and bulgur.  Eat a variety of vegetables every day.  Include dark, leafy greens such as spinach, kale, adal greens, and mustard greens. Eat yellow and orange vegetables such as carrots, sweet potatoes, and winter squash.   Eat a variety of fruits every day.  Choose fresh or canned fruit (canned in its own juice or light syrup) instead of juice. Fruit juice has very little or no fiber.  Eat low-fat dairy foods.  Drink fat-free (skim) milk or 1% milk. Eat fat-free yogurt and low-fat cottage cheese. Try low-fat cheeses such as mozzarella and other reduced-fat cheeses.  Choose meat and other protein foods that are low in fat.  Choose beans or other legumes such as split peas or lentils. Choose fish, skinless poultry (chicken or turkey), or lean cuts of red meat (beef or pork). Before you cook meat or poultry, cut off any visible fat.   Use less fat and oil.  Try baking foods instead of frying them. Add less fat, such as margarine,  sour cream, regular salad dressing and mayonnaise to foods. Eat fewer high-fat foods. Some examples of high-fat foods include french fries, doughnuts, ice cream, and cakes.  Eat fewer sweets.  Limit foods and drinks that are high in sugar. This includes candy, cookies, regular soda, and sweetened drinks.  Exercise:  Exercise at least 30 minutes per day on most days of the week. Some examples of exercise include walking, biking, dancing, and swimming. You can also fit in more physical activity by taking the stairs instead of the elevator or parking farther away from stores. Ask your healthcare provider about the best exercise plan for you.      © Copyright [employer] 2018 Information is for End User's use only and may not be sold, redistributed or otherwise used for commercial purposes. All illustrations and images included in CareNotes® are the copyrighted property of [employer] Inc. or [employer]

## 2025-01-03 ENCOUNTER — OFFICE VISIT (OUTPATIENT)
Dept: UROLOGY | Facility: CLINIC | Age: 70
End: 2025-01-03
Payer: MEDICARE

## 2025-01-03 VITALS
WEIGHT: 200 LBS | DIASTOLIC BLOOD PRESSURE: 82 MMHG | OXYGEN SATURATION: 95 % | BODY MASS INDEX: 29.62 KG/M2 | SYSTOLIC BLOOD PRESSURE: 124 MMHG | HEART RATE: 56 BPM | HEIGHT: 69 IN

## 2025-01-03 DIAGNOSIS — C61 PROSTATE CANCER (HCC): Primary | ICD-10-CM

## 2025-01-03 PROCEDURE — 99214 OFFICE O/P EST MOD 30 MIN: CPT | Performed by: UROLOGY

## 2025-01-03 NOTE — PROGRESS NOTES
Name: Kristopher Cortez      : 1955      MRN: [id number]  Encounter Provider: Delonte Morocho MD  Encounter Date: 1/3/2025   Encounter department: [facility] [place] [facility]  :  Assessment & Plan  Prostate cancer (HCC)    Orders:  •  PSA Total, Diagnostic; Future    Impression: History of small-volume Stuart 6 prostate cancer on active surveillance    Plan: I provided the patient with reassurance that not only was his biopsy in  negative for prostate cancer but his most recent biopsy from 2024 shows no evidence of cancer.  We discussed his PSA of 13 may be driven by his prostate size of over 90 g.  He has relatively minimal lower urinary tract symptoms.  We did discuss the possibility of adding finasteride.  Patient is not interested in finasteride at this time.  I recommend that he return in 6 months time with his next PSA level.  We will continue to monitor him with multiparametric MRI every 12 to 18 months.  Ideally we can hold on additional prostate biopsies at this time.  The patient understands the risk of disease progression on active surveillance, however, I am very reassured by his most recent biopsy which failed to even identify a small volume Stuart 6 disease.    History of Present Illness   Kristopher Cortez is a 69 y.o. male who presents in follow-up after a recent transperineal ultrasound-guided biopsy of the prostate.  He has a history of Byron 6 prostate cancer diagnosed in  on active surveillance.  He had a confirmatory biopsy in  and all cores were negative.  He underwent a nonfusion biopsy as his MRI revealed PI-RADS 2 scoring.  His most recent PSA level is noted to be 12.933.  This is his highest PSA to date.  Fortunately in follow-up today his most recent prostate biopsy is negative for malignancy.  AUA SYMPTOM SCORE      Flowsheet Row Most Recent Value   AUA SYMPTOM SCORE    How often have you had a sensation of not emptying your bladder completely  "after you finished urinating? 1 (P)     How often have you had to urinate again less than two hours after you finished urinating? 1 (P)     How often have you found you stopped and started again several times when you urinate? 0 (P)     How often have you found it difficult to postpone urination? 2 (P)     How often have you had a weak urinary stream? 4 (P)     How often have you had to push or strain to begin urination? 0 (P)     How many times did you most typically get up to urinate from the time you went to bed at night until the time you got up in the morning? 2 (P)     Quality of Life: If you were to spend the rest of your life with your urinary condition just the way it is now, how would you feel about that? 2 (P)     AUA SYMPTOM SCORE 10 (P)            Review of Systems       Objective   /82 (BP Location: Left arm, Patient Position: Sitting, Cuff Size: Adult)   Pulse 56   Ht 5' 9\" (1.753 m)   Wt 90.7 kg (200 lb)   SpO2 95%   BMI 29.53 kg/m²     Physical Exam on examination he is in no acute distress.  Gait normal.  Affect normal    Results  Lab Results   Component Value Date    PSA 12.933 (H) 08/29/2024    PSA 11.04 (H) 03/07/2024    PSA 9.20 (H) 08/04/2023     Lab Results   Component Value Date    CALCIUM 9.5 12/26/2024    K 4.3 12/26/2024    CO2 35 (H) 12/26/2024     12/26/2024    BUN 24 12/26/2024    CREATININE 1.17 12/26/2024     Lab Results   Component Value Date    WBC 6.23 12/26/2024    HGB 14.1 12/26/2024    HCT 43.0 12/26/2024    MCV 92 12/26/2024     12/26/2024       Office Urine Dip  No results found for this or any previous visit (from the past hour).]      "

## 2025-01-29 PROBLEM — Z00.00 MEDICARE ANNUAL WELLNESS VISIT, SUBSEQUENT: Status: RESOLVED | Noted: 2018-12-06 | Resolved: 2025-01-29

## 2025-03-02 DIAGNOSIS — I10 PRIMARY HYPERTENSION: ICD-10-CM

## 2025-03-03 RX ORDER — LOSARTAN POTASSIUM AND HYDROCHLOROTHIAZIDE 12.5; 5 MG/1; MG/1
1 TABLET ORAL DAILY
Qty: 90 TABLET | Refills: 1 | Status: SHIPPED | OUTPATIENT
Start: 2025-03-03

## 2025-04-12 DIAGNOSIS — N52.8 OTHER MALE ERECTILE DYSFUNCTION: ICD-10-CM

## 2025-04-12 DIAGNOSIS — N13.8 BENIGN PROSTATIC HYPERPLASIA WITH URINARY OBSTRUCTION: ICD-10-CM

## 2025-04-12 DIAGNOSIS — N40.1 BENIGN PROSTATIC HYPERPLASIA WITH URINARY OBSTRUCTION: ICD-10-CM

## 2025-04-14 ENCOUNTER — TELEPHONE (OUTPATIENT)
Age: 70
End: 2025-04-14

## 2025-04-14 RX ORDER — TADALAFIL 5 MG/1
5 TABLET ORAL DAILY
Qty: 90 TABLET | Refills: 3 | Status: SHIPPED | OUTPATIENT
Start: 2025-04-14

## 2025-04-14 NOTE — TELEPHONE ENCOUNTER
PA for TADALAFIL 5 MG  APPROVED     Date(s) approved 04/14/2026    Case #    Patient advised by          [x]MyChart Message  []Phone call   []LMOM  []L/M to call office as no active Communication consent on file  []Unable to leave detailed message as VM not approved on Communication consent       Pharmacy advised by    [x]Fax  []Phone call  []Secure Chat    Specialty Pharmacy    []     Approval letter scanned into [employer] WILL SCAN UPON RECEIPT

## 2025-04-14 NOTE — TELEPHONE ENCOUNTER
PA for TADALAFIL 5 MG SUBMITTED to [employer]    via      [x][employer]-Case ID # [id number]       [x]PA sent as URGENT    All office notes, labs and other pertaining documents and studies sent. Clinical questions answered. Awaiting determination from insurance company.     Turnaround time for your insurance to make a decision on your Prior Authorization can take 7-21 business days.

## 2025-05-02 ENCOUNTER — RESULTS FOLLOW-UP (OUTPATIENT)
Dept: FAMILY MEDICINE CLINIC | Facility: CLINIC | Age: 70
End: 2025-05-02

## 2025-05-02 ENCOUNTER — HOSPITAL ENCOUNTER (OUTPATIENT)
Dept: NON INVASIVE DIAGNOSTICS | Facility: HOSPITAL | Age: 70
Discharge: HOME | End: 2025-05-02
Payer: MEDICARE

## 2025-05-02 DIAGNOSIS — I65.23 BILATERAL CAROTID ARTERY STENOSIS: ICD-10-CM

## 2025-05-02 PROCEDURE — 93880 EXTRACRANIAL BILAT STUDY: CPT

## 2025-05-02 PROCEDURE — 93880 EXTRACRANIAL BILAT STUDY: CPT | Performed by: SURGERY

## 2025-06-25 ENCOUNTER — APPOINTMENT (OUTPATIENT)
Dept: LAB | Facility: CLINIC | Age: 70
End: 2025-06-25
Payer: MEDICARE

## 2025-06-25 DIAGNOSIS — C61 PROSTATE CANCER (HCC): ICD-10-CM

## 2025-06-25 LAB — PSA SERPL-MCNC: 11.43 NG/ML (ref 0–4)

## 2025-06-25 PROCEDURE — 36415 COLL VENOUS BLD VENIPUNCTURE: CPT

## 2025-06-25 PROCEDURE — 84153 ASSAY OF PSA TOTAL: CPT

## 2025-07-02 ENCOUNTER — OFFICE VISIT (OUTPATIENT)
Dept: UROLOGY | Facility: CLINIC | Age: 70
End: 2025-07-02
Payer: MEDICARE

## 2025-07-02 VITALS
HEIGHT: 69 IN | SYSTOLIC BLOOD PRESSURE: 114 MMHG | OXYGEN SATURATION: 95 % | HEART RATE: 56 BPM | DIASTOLIC BLOOD PRESSURE: 80 MMHG | BODY MASS INDEX: 29.18 KG/M2 | WEIGHT: 197 LBS

## 2025-07-02 DIAGNOSIS — N13.8 BENIGN PROSTATIC HYPERPLASIA WITH URINARY OBSTRUCTION: ICD-10-CM

## 2025-07-02 DIAGNOSIS — N52.8 OTHER MALE ERECTILE DYSFUNCTION: ICD-10-CM

## 2025-07-02 DIAGNOSIS — C61 PROSTATE CANCER (HCC): Primary | ICD-10-CM

## 2025-07-02 DIAGNOSIS — N40.1 BENIGN PROSTATIC HYPERPLASIA WITH URINARY OBSTRUCTION: ICD-10-CM

## 2025-07-02 PROCEDURE — 99214 OFFICE O/P EST MOD 30 MIN: CPT | Performed by: UROLOGY

## 2025-07-02 NOTE — PROGRESS NOTES
Name: Kristopher Cortez      : 1955      MRN: [id number]  Encounter Provider: Delonte Morocho MD  Encounter Date: 2025   Encounter department: [facility] UROLOGY [place] [facility]  :  Assessment & Plan  Prostate cancer (HCC)    Orders:  •  PSA Total, Diagnostic; Future    Benign prostatic hyperplasia with urinary obstruction         Other male erectile dysfunction           Impression: Small volume Gardiner 6 prostate cancer on active surveillance, erectile dysfunction, asymptomatic BPH    Plan: Today we discussed and reviewed his history of small-volume Byron 6 prostate cancer.  Specifically we discussed that his last 2 prostate biopsies have shown no evidence of prostate cancer or disease.  His PSA peaked at 13 unfortunately has decreased slightly.  We discussed that his PSA is likely being driven by his enlarged prostate.  We discussed finasteride but he is not interested secondary to potential side effect profile.  He is continuing the Cialis 5 mg daily for his BPH.  He understands that he can take 3 additional 5 mg tablets in a single 24-hour period for a total dose of 20 mg if he wishes to be sexually active.  Follow-up in 6 months with his next PSA level.  Consider next multiparametric MRI of the prostate in the spring 2026 or sooner if the PSA continues to rise.  History of Present Illness   Kristopher Cortez is a 69 y.o. male who presents in follow-up with a history of small-volume Byrno 6 prostate cancer.  He underwent a transperineal biopsy in the operating room performed recently in 2024.  This biopsy showed no evidence of malignancy.  His history of Byron 6 disease dates back to 2019.  He had a confirmatory biopsy in  which revealed all cores negative for malignancy.  We discussed that his PSA which has been as high as 13 may be driven by his 90 g prostate.  Fortunately his most recent PSA has decreased slightly to 11.431.  He does complain of mild to moderate erectile  "dysfunction.    AUA SYMPTOM SCORE      Flowsheet Row Most Recent Value   AUA SYMPTOM SCORE    How often have you had a sensation of not emptying your bladder completely after you finished urinating? 3 (P)     How often have you had to urinate again less than two hours after you finished urinating? 2 (P)     How often have you found you stopped and started again several times when you urinate? 0 (P)     How often have you found it difficult to postpone urination? 4 (P)     How often have you had a weak urinary stream? 4 (P)     How often have you had to push or strain to begin urination? 1 (P)     How many times did you most typically get up to urinate from the time you went to bed at night until the time you got up in the morning? 1 (P)     Quality of Life: If you were to spend the rest of your life with your urinary condition just the way it is now, how would you feel about that? 3 (P)     AUA SYMPTOM SCORE 15 (P)            Review of Systems       Objective   /80 (BP Location: Left arm, Patient Position: Sitting, Cuff Size: Adult)   Pulse 56   Ht 5' 9\" (1.753 m)   Wt 89.4 kg (197 lb)   SpO2 95%   BMI 29.09 kg/m²     Physical Exam on examination he is in no acute distress.  Gait normal.  Affect normal.  Abdomen soft nontender nondistended.  Skin is warm.  Extremities without edema    Results   Lab Results   Component Value Date    PSA 11.431 (H) 06/25/2025    PSA 12.933 (H) 08/29/2024    PSA 11.04 (H) 03/07/2024     Lab Results   Component Value Date    CALCIUM 8.7 03/01/2025    K 4.3 03/01/2025    CO2 28 03/01/2025     03/01/2025    BUN 17 03/01/2025    CREATININE 1.33 (H) 03/01/2025     Lab Results   Component Value Date    WBC 6.23 12/26/2024    HGB 13.1 03/01/2025    HCT 37.2 03/01/2025    MCV 92 12/26/2024     12/26/2024       Office Urine Dip  No results found for this or any previous visit (from the past hour).        "

## (undated) DEVICE — CHLORAPREP HI-LITE 26ML ORANGE

## (undated) DEVICE — GLOVE SRG BIOGEL ECLIPSE 7

## (undated) DEVICE — 3M™ TRANSPORE™ WHITE SURGICAL TAPE 1534-1, 1 INCH X 10 YARD (2,5CM X 9,1M), 12 ROLLS/CARTON 10 CARTONS/CASE: Brand: 3M™ TRANSPORE™

## (undated) DEVICE — 3M™ TEGADERM™ TRANSPARENT FILM DRESSING FRAME STYLE, 1626W, 4 IN X 4-3/4 IN (10 CM X 12 CM), 50/CT 4CT/CASE: Brand: 3M™ TEGADERM™

## (undated) DEVICE — RENTAL PROBE ULTRASOUND DROP IN BK5000

## (undated) DEVICE — SYRINGE 10ML LL

## (undated) DEVICE — SUT PDS II 1 CT-1 27 IN Z347H

## (undated) DEVICE — BETHLEHEM UNIVERSAL MINOR GEN: Brand: CARDINAL HEALTH

## (undated) DEVICE — GLOVE SRG BIOGEL 8

## (undated) DEVICE — SUT VICRYL 3-0 SH 27 IN J416H

## (undated) DEVICE — NEEDLE SPINAL 22G X 7IN QUINCKE

## (undated) DEVICE — RAZOR STERILE

## (undated) DEVICE — DRESSING MEPILEX AG BORDER 4 X 12 IN

## (undated) DEVICE — 3M™ TEGADERM™ TRANSPARENT FILM DRESSING FRAME STYLE, 1624W, 2-3/8 IN X 2-3/4 IN (6 CM X 7 CM), 100/CT 4CT/CASE: Brand: 3M™ TEGADERM™

## (undated) DEVICE — GAUZE SPONGES,16 PLY: Brand: CURITY

## (undated) DEVICE — MEDI-VAC YANK SUCT HNDL W/TPRD BULBOUS TIP: Brand: CARDINAL HEALTH

## (undated) DEVICE — NEEDLE 25G X 1 1/2

## (undated) DEVICE — PREP PAD BNS: Brand: CONVERTORS

## (undated) DEVICE — INTENDED FOR TISSUE SEPARATION, AND OTHER PROCEDURES THAT REQUIRE A SHARP SURGICAL BLADE TO PUNCTURE OR CUT.: Brand: BARD-PARKER SAFETY BLADES SIZE 15, STERILE

## (undated) DEVICE — MAX-CORE® DISPOSABLE CORE BIOPSY INSTRUMENT, 18G X 25CM: Brand: MAX-CORE

## (undated) DEVICE — ADHESIVE SKN CLSR HISTOACRYL FLEX 0.5ML LF

## (undated) DEVICE — TUBING SUCTION 5MM X 12 FT

## (undated) DEVICE — LUBRICANT SURGILUBE TUBE 4 OZ  FLIP TOP

## (undated) DEVICE — 3M™ IOBAN™ 2 ANTIMICROBIAL INCISE DRAPE 6650EZ: Brand: IOBAN™ 2

## (undated) DEVICE — DRAPE PROBE NEO-PROBE/ULTRASOUND

## (undated) DEVICE — SPONGE 4 X 4 XRAY 16 PLY STRL LF RFD

## (undated) DEVICE — SPECIMEN CONTAINER STERILE PEEL PACK

## (undated) DEVICE — SKIN MARKER DUAL TIP WITH RULER CAP, FLEXIBLE RULER AND LABELS: Brand: DEVON

## (undated) DEVICE — 3M™ STERI-STRIP™ REINFORCED ADHESIVE SKIN CLOSURES, R1546, 1/4 IN X 4 IN (6 MM X 100 MM), 10 STRIPS/ENVELOPE: Brand: 3M™ STERI-STRIP™

## (undated) DEVICE — MAX-CORE® DISPOSABLE CORE BIOPSY INSTRUMENT, 18G X 20CM: Brand: MAX-CORE

## (undated) DEVICE — DRAPE EQUIPMENT RF WAND

## (undated) DEVICE — SUT MONOCRYL 4-0 PS-2 27 IN Y426H

## (undated) DEVICE — PLUMEPEN PRO 10FT

## (undated) DEVICE — SYSTEM TRANSPERINEAL ACCESS PRECISIONPOINT

## (undated) DEVICE — TELFA NON-ADHERENT ABSORBENT DRESSING: Brand: TELFA

## (undated) DEVICE — GLOVE INDICATOR PI UNDERGLOVE SZ 7 BLUE

## (undated) DEVICE — 3000CC GUARDIAN II: Brand: GUARDIAN

## (undated) DEVICE — GLOVE SRG BIOGEL 7.5

## (undated) DEVICE — SCD SEQUENTIAL COMPRESSION COMFORT SLEEVE MEDIUM KNEE LENGTH: Brand: KENDALL SCD

## (undated) DEVICE — BINDER ABDOMINAL 46-62 IN

## (undated) DEVICE — INTENDED FOR TISSUE SEPARATION, AND OTHER PROCEDURES THAT REQUIRE A SHARP SURGICAL BLADE TO PUNCTURE OR CUT.: Brand: BARD-PARKER SAFETY BLADES SIZE 10, STERILE

## (undated) DEVICE — 1820 FOAM BLOCK NEEDLE COUNTER: Brand: DEVON

## (undated) DEVICE — FORMALIN PRE-FILLED 10 PCT PROSTATE BIOPSY

## (undated) DEVICE — UTILITY MARKER,BLACK WITH LABELS: Brand: DEVON

## (undated) DEVICE — ULTRASOUND GEL STERILE FOIL PK

## (undated) DEVICE — STERILE (2 X 30CM) LATEX COVER: Brand: PROCOVERS™

## (undated) DEVICE — LUBRICANT JELLY SURGILUBE TUBE 2OZ FLIP TOP